# Patient Record
Sex: FEMALE | Race: WHITE | NOT HISPANIC OR LATINO | Employment: UNEMPLOYED | URBAN - METROPOLITAN AREA
[De-identification: names, ages, dates, MRNs, and addresses within clinical notes are randomized per-mention and may not be internally consistent; named-entity substitution may affect disease eponyms.]

---

## 2019-01-29 ENCOUNTER — HOSPITAL ENCOUNTER (OUTPATIENT)
Dept: OTHER | Facility: HOSPITAL | Age: 29
Discharge: HOME | End: 2019-01-29
Attending: SURGERY | Admitting: SURGERY

## 2019-01-29 LAB
ALBUMIN SERPL-MCNC: 3.9 G/DL (ref 3.5–4.8)
ALBUMIN/GLOB SERPL: 1.3 {RATIO} (ref 1–1.7)
ALP SERPL-CCNC: 73 IU/L (ref 32–91)
ALT SERPL-CCNC: 42 IU/L (ref 14–54)
ANION GAP SERPL CALC-SCNC: 14.9 MMOL/L (ref 10–20)
AST SERPL-CCNC: 34 IU/L (ref 15–41)
BASOPHILS # BLD AUTO: 0.1 10*3/UL (ref 0–0.2)
BASOPHILS NFR BLD AUTO: 1 % (ref 0–2)
BILIRUB SERPL-MCNC: 0.5 MG/DL (ref 0.3–1.2)
BUN SERPL-MCNC: 13 MG/DL (ref 8–20)
BUN/CREAT SERPL: 16.3 (ref 5.4–26.2)
CALCIUM SERPL-MCNC: 9 MG/DL (ref 8.9–10.3)
CHLORIDE SERPL-SCNC: 100 MMOL/L (ref 101–111)
CHOLEST SERPL-MCNC: 205 MG/DL
CHOLEST/HDLC SERPL: 4.6 {RATIO}
CONV CO2: 26 MMOL/L (ref 22–32)
CONV LDL CHOLESTEROL DIRECT: 126 MG/DL (ref 0–100)
CONV TOTAL PROTEIN: 7 G/DL (ref 6.1–7.9)
CREAT UR-MCNC: 0.8 MG/DL (ref 0.4–1)
DIFFERENTIAL METHOD BLD: (no result)
EOSINOPHIL # BLD AUTO: 0.1 10*3/UL (ref 0–0.3)
EOSINOPHIL # BLD AUTO: 1 % (ref 0–3)
ERYTHROCYTE [DISTWIDTH] IN BLOOD BY AUTOMATED COUNT: 13.3 % (ref 11.5–14.5)
GLOBULIN UR ELPH-MCNC: 3.1 G/DL (ref 2.5–3.8)
GLUCOSE SERPL-MCNC: 103 MG/DL (ref 65–99)
HCT VFR BLD AUTO: 42.1 % (ref 35–49)
HDLC SERPL-MCNC: 44 MG/DL
HGB BLD-MCNC: 13.9 G/DL (ref 12–15)
LDLC/HDLC SERPL: 2.8 {RATIO}
LIPID INTERPRETATION: ABNORMAL
LYMPHOCYTES # BLD AUTO: 2.7 10*3/UL (ref 0.8–4.8)
LYMPHOCYTES NFR BLD AUTO: 27 % (ref 18–42)
MCH RBC QN AUTO: 28.9 PG (ref 26–32)
MCHC RBC AUTO-ENTMCNC: 33 G/DL (ref 32–36)
MCV RBC AUTO: 87.4 FL (ref 80–94)
MONOCYTES # BLD AUTO: 0.5 10*3/UL (ref 0.1–1.3)
MONOCYTES NFR BLD AUTO: 5 % (ref 2–11)
NEUTROPHILS # BLD AUTO: 6.8 10*3/UL (ref 2.3–8.6)
NEUTROPHILS NFR BLD AUTO: 66 % (ref 50–75)
NRBC BLD AUTO-RTO: 0 /100{WBCS}
NRBC/RBC NFR BLD MANUAL: 0 10*3/UL
PLATELET # BLD AUTO: 420 10*3/UL (ref 150–450)
PMV BLD AUTO: 7.7 FL (ref 7.4–10.4)
POTASSIUM SERPL-SCNC: 3.9 MMOL/L (ref 3.6–5.1)
RBC # BLD AUTO: 4.82 10*6/UL (ref 4–5.4)
SODIUM SERPL-SCNC: 137 MMOL/L (ref 136–144)
TRIGL SERPL-MCNC: 303 MG/DL
VLDLC SERPL CALC-MCNC: 34.2 MG/DL
WBC # BLD AUTO: 10.1 10*3/UL (ref 4.5–11.5)

## 2019-04-18 ENCOUNTER — HOSPITAL ENCOUNTER (OUTPATIENT)
Dept: GASTROENTEROLOGY | Facility: HOSPITAL | Age: 29
Setting detail: HOSPITAL OUTPATIENT SURGERY
Discharge: HOME | End: 2019-04-18
Attending: SURGERY | Admitting: SURGERY

## 2019-04-18 LAB — HCG UR QL: NEGATIVE

## 2019-06-04 ENCOUNTER — CONVERSION ENCOUNTER (OUTPATIENT)
Dept: OTHER | Facility: HOSPITAL | Age: 29
End: 2019-06-04

## 2019-06-04 VITALS — DIASTOLIC BLOOD PRESSURE: 97 MMHG | HEART RATE: 71 BPM | SYSTOLIC BLOOD PRESSURE: 145 MMHG | WEIGHT: 293 LBS

## 2019-06-06 NOTE — PROGRESS NOTES
Vital Signs -Extended   Weight: 323.6 pounds  Pulse rate: 71 /min  Blood Pressure: 145/97 mm Hg    Calculations   Body Mass Index: 50.87      Body Surface Area (m2): 2.48    Current Allergies:  No known allergies        Weight in # changed since last appt: +.8  Total Weight Change: +8.4  Short term Goal Progress: 1) met having lean cuisine/healthy choice/[employer] 3x wk, 2) Partially met 5 days a week journaling, 3) Met cooking health recipes for dinner  Exercise   Exercise Type: aerobic  Frequency of exercise: 7 days week  Duration of exercise: < 20 min  Intensity of exercise: moderate  Weight loss RX: 2000 calories  Weight loss RX Comments: 500-600 kcal/meal, 100-200 kcal/snack   Goals Status: goals partially met  Objectives to success: emotional, work/family schedule  Additional comments: Has continued to eat and drink separately. Getting 64 fl oz. Walking up and down stairs more.   24 Hr Diet History   1st Meal: yogurt  2nd Meal: Lean Cuisine, [place] sandwich dried fruit  3rd Meal: Pork Chops with apples, Baked turkey & chicken, Spaghetti, Grilled Steak   Beverages: water  Alcohol: Occasionally   Out to Eat: has not eaten out  Who cooks: pt   Who shops: pt   Short Term Goals:   1. Wt loss goal 3-5# by preop.   2. Journaling food 5 days per week by preop.   Time: preop   Physician: Dr. Maciel   Patient given copy of office visit note.         Electronically signed by Lili POE on 06/04/2019 at 11:47 AM  ________________________________________________________________________       Disclaimer: Converted Note message may not contain all data elements that existed in the [employer] source system. Please see [employer] for the original note details.

## 2019-06-24 ENCOUNTER — OFFICE VISIT (OUTPATIENT)
Dept: BARIATRICS/WEIGHT MGMT | Facility: CLINIC | Age: 29
End: 2019-06-24

## 2019-06-24 VITALS
BODY MASS INDEX: 43.4 KG/M2 | DIASTOLIC BLOOD PRESSURE: 77 MMHG | HEIGHT: 69 IN | SYSTOLIC BLOOD PRESSURE: 129 MMHG | WEIGHT: 293 LBS | HEART RATE: 77 BPM

## 2019-06-24 DIAGNOSIS — E66.9 SUPER OBESE: Primary | ICD-10-CM

## 2019-06-24 NOTE — PATIENT INSTRUCTIONS
1) Contine drinking and eating separately, 2) Continue exercise, 3) Journaling foor 5 days per week,

## 2019-07-10 ENCOUNTER — PREP FOR SURGERY (OUTPATIENT)
Dept: OTHER | Facility: HOSPITAL | Age: 29
End: 2019-07-10

## 2019-07-10 DIAGNOSIS — E66.01 OBESITY, CLASS III, BMI 40-49.9 (MORBID OBESITY) (HCC): Primary | ICD-10-CM

## 2019-07-10 RX ORDER — CEFAZOLIN SODIUM IN 0.9 % NACL 3 G/100 ML
3 INTRAVENOUS SOLUTION, PIGGYBACK (ML) INTRAVENOUS
Status: CANCELLED | OUTPATIENT
Start: 2019-07-10

## 2019-07-10 RX ORDER — SCOLOPAMINE TRANSDERMAL SYSTEM 1 MG/1
1 PATCH, EXTENDED RELEASE TRANSDERMAL ONCE
Status: CANCELLED | OUTPATIENT
Start: 2019-07-10 | End: 2019-07-10

## 2019-07-10 RX ORDER — SODIUM CHLORIDE 0.9 % (FLUSH) 0.9 %
3-10 SYRINGE (ML) INJECTION AS NEEDED
Status: CANCELLED | OUTPATIENT
Start: 2019-07-10

## 2019-07-10 RX ORDER — CHLORHEXIDINE GLUCONATE 0.12 MG/ML
15 RINSE ORAL SEE ADMIN INSTRUCTIONS
Status: CANCELLED | OUTPATIENT
Start: 2019-07-10

## 2019-07-10 RX ORDER — SODIUM CHLORIDE, SODIUM LACTATE, POTASSIUM CHLORIDE, CALCIUM CHLORIDE 600; 310; 30; 20 MG/100ML; MG/100ML; MG/100ML; MG/100ML
100 INJECTION, SOLUTION INTRAVENOUS CONTINUOUS
Status: CANCELLED | OUTPATIENT
Start: 2019-07-10

## 2019-07-10 RX ORDER — METOCLOPRAMIDE HYDROCHLORIDE 5 MG/ML
10 INJECTION INTRAMUSCULAR; INTRAVENOUS ONCE
Status: CANCELLED | OUTPATIENT
Start: 2019-07-10 | End: 2019-07-10

## 2019-07-10 RX ORDER — PANTOPRAZOLE SODIUM 40 MG/10ML
40 INJECTION, POWDER, LYOPHILIZED, FOR SOLUTION INTRAVENOUS ONCE
Status: CANCELLED | OUTPATIENT
Start: 2019-07-10 | End: 2019-07-10

## 2019-07-10 RX ORDER — SODIUM CHLORIDE 0.9 % (FLUSH) 0.9 %
3 SYRINGE (ML) INJECTION EVERY 12 HOURS SCHEDULED
Status: CANCELLED | OUTPATIENT
Start: 2019-07-10

## 2019-07-11 PROBLEM — E66.01 OBESITY, CLASS III, BMI 40-49.9 (MORBID OBESITY): Status: ACTIVE | Noted: 2019-07-11

## 2019-07-15 ENCOUNTER — CONSULT (OUTPATIENT)
Dept: BARIATRICS/WEIGHT MGMT | Facility: CLINIC | Age: 29
End: 2019-07-15

## 2019-07-15 VITALS
RESPIRATION RATE: 16 BRPM | SYSTOLIC BLOOD PRESSURE: 109 MMHG | TEMPERATURE: 97.9 F | HEART RATE: 77 BPM | BODY MASS INDEX: 43.4 KG/M2 | HEIGHT: 69 IN | WEIGHT: 293 LBS | DIASTOLIC BLOOD PRESSURE: 75 MMHG

## 2019-07-15 DIAGNOSIS — E66.01 OBESITY, CLASS III, BMI 40-49.9 (MORBID OBESITY) (HCC): Primary | ICD-10-CM

## 2019-07-15 PROCEDURE — 99214 OFFICE O/P EST MOD 30 MIN: CPT | Performed by: SURGERY

## 2019-07-15 NOTE — PROGRESS NOTES
Bariatric Consult:  Referred by Gadiel Barrett MD    Gwen Barnes is here today for consult on Consult (preop visit gastric sleeve)      History of Present Illness:     Gwen Barnes is a 29 y.o. female with morbid obesity with co-morbidities including none who presents for surgical consultation for the above procedure. Gwen has completed the initial intake visit and has been examined by our nurse practitioner, dietician, psychologist and underwent the extensive educational teaching process under the guidance of our bariatric coordinator and myself. Gwen also has seen the educational video HUSEYIN on the surgical procedure if available. Gwen attended today more educational teaching from our bariatric coordinator and myself. Gwen has had an extensive medical workup including a visit with their primary care physician, EKG, chest radiograph, blood work, EGD or UGI and possibly further testing. These have been reviewed by me and discussed with the patient. Gwen is now ready to proceed with surgery. Gwen presently denies nausea, vomiting, fever, chills, chest pain, shortness of air, melena, hematochezia, hemetemesis, dysuria, frequency, hematuria, jaundice or abdominal pain.     Her pre-op EGD shows normal      PMH/PSH- tonsillectomy, D&C, PCOS, anxiety, fatigue, heartburn,     Patient Active Problem List   Diagnosis   • Obesity, Class III, BMI 40-49.9 (morbid obesity) (CMS/HCC)       Allergies no known allergies    No current outpatient medications on file.    Social History     Socioeconomic History   • Marital status: Single     Spouse name: Not on file   • Number of children: Not on file   • Years of education: Not on file   • Highest education level: Not on file       No family history on file.    Review of Systems:  Review of Systems   Constitutional: Negative.    HENT: Negative.    Eyes: Negative.    Respiratory: Negative.    Cardiovascular: Negative.    Gastrointestinal: Negative.    Endocrine:  Negative.    Genitourinary: Negative.    Musculoskeletal: Negative.    Skin: Negative.    Allergic/Immunologic: Negative.    Neurological: Negative.    Hematological: Negative.    Psychiatric/Behavioral: Negative.          Physical Exam:    Vital Signs:  Weight: (!) 146 kg (321 lb 3.2 oz)   Body mass index is 47.43 kg/m².  Temp: 97.9 °F (36.6 °C)   Heart Rate: 77   BP: 109/75       Physical Exam   Constitutional: She is oriented to person, place, and time. She appears well-developed and well-nourished.   HENT:   Head: Normocephalic and atraumatic.   Eyes: Conjunctivae and EOM are normal. Pupils are equal, round, and reactive to light.   Neck: Normal range of motion. Neck supple.   Cardiovascular: Normal rate, regular rhythm, normal heart sounds and intact distal pulses.   Pulmonary/Chest: Effort normal and breath sounds normal.   Abdominal: Soft. Bowel sounds are normal. She exhibits no distension and no mass. There is no tenderness. There is no rebound and no guarding. No hernia.   Musculoskeletal: Normal range of motion. She exhibits no edema.   Neurological: She is alert and oriented to person, place, and time.   Skin: Skin is warm and dry.   Psychiatric: She has a normal mood and affect.   Vitals reviewed.        Assessment:    Gwen Barnes is a 29 y.o. year old female with medically complicated severe obesity with a BMI of Body mass index is 47.43 kg/m². and multiple co-morbidities listed in the encounter diagnosis.    I think she is an appropriate candidate for this surgery, and is ready to proceed.      Plan/Discussion/Summary:        I instructed patient to start on a H2 blocker or proton pump inhibitor if not already on one of these medications.    I explained in detail the procedures that we perform.  All of these procedures have a chance to convert to open if any technical challenges or complications do occur.  Bariatric surgery is not cosmetic surgery but rather a tool to help a patient make a life-long  commitment lifestyle change including diet, exercise, behavior changes, and taking supplemental vitamins and minerals.    Problems after surgery may require more operations to correct them.    The risks, benefits, alternatives, and potential complications of all of the procedures were explained in detail including, but not limited to death, anesthesia and medication adverse effect, deep venous thrombosis, pulmonary embolism, trocar site/incisional hernia, wound infection, abdominal infection, bleeding, failure to lose weight, gain weight, a change in body image, metabolic complications with vitamin deficiences and anemia.    Weight loss expectations were discussed with the patient in detail. The weight loss operations most commonly performed are the sleeve gastrectomy and the Edwar-en-Y gastric bypass. These operations result in weight losses up to approximately 25-35% of initial body weight 12 to 24 months after surgery with the gastric bypass usually the higher percent of weight loss but depends on patient using the tool.    For the gastric bypass and loop duodenal switch (ANALIA-S) the risks include but not limited to the following early complications:  Anastomotic leak/peritonitis, Edwar/Alimentary/biliopancreatic limb obstruction, severe & minor wound infection/seroma, and nausea/vomiting.  Late complications can include but are not limited to malnutrition, vitamin deficiencies, frequent loose stools,  stomal stenosis, marginal ulcer, bowel obstruction, intussusception, internal, and incisional hernia.    Regarding the gastric sleeve, there is less long-term outcome data and higher risk of dysphagia and reflux compared to a gastric bypass, as well as risk of internal visceral/organ injury, splenectomy, bleeding, infection, leak (which could require further intervention possible conversion to Edwar-en-Y gastric bypass), stenosis and possibility of regaining weight.    Gwen was counseled regarding diagnostic results,  instructions for management, risk factor reductions, prognosis, patient and family education, impressions, risks and benefits of treatment options and importance of compliance with treatment. Total face to face time of the encounter was over 45 minutes and over 30 minutes was spent counseling.     Kayley Report   As part of this patient's treatment plan I am prescribing controlled substances. The patient has been made aware of appropriate use of such medications, including potential risk of somnolence, limited ability to drive and /or work safely, and potential for dependence or overdose. It has also been made clear that these medications are for use by this patient only, without concomitant use of alcohol or other substances unless prescribed.    Gwen has completed prescribing agreement detailing terms of continued prescribing of controlled substances, including monitoring KAYLEY reports, urine drug screening, and pill counts if necessary. Gwen is aware that inappropriate use will result in cessation of prescribing such medications.    KAYLEY report has been reviewed      History and physical exam exhibit continued safe and appropriate use of controlled substances.      Gwen understands the surgical procedures and the different surgical options that are available.  She understands the lifestyle changes that are required after surgery and has agreed to follow the guidelines outlined in the weight management program.  She also expressed understanding of the risks involved and had all of female questions answered and desires to proceed.      Luna Keyes MD  7/15/2019

## 2019-07-16 RX ORDER — URSODIOL 250 MG/1
250 TABLET, FILM COATED ORAL 2 TIMES DAILY
Qty: 60 TABLET | Refills: 5 | Status: SHIPPED | OUTPATIENT
Start: 2019-07-16 | End: 2019-08-15

## 2019-07-26 ENCOUNTER — APPOINTMENT (OUTPATIENT)
Dept: PREADMISSION TESTING | Facility: HOSPITAL | Age: 29
End: 2019-07-26

## 2019-07-26 VITALS
SYSTOLIC BLOOD PRESSURE: 123 MMHG | HEART RATE: 61 BPM | OXYGEN SATURATION: 100 % | WEIGHT: 293 LBS | HEIGHT: 69 IN | DIASTOLIC BLOOD PRESSURE: 86 MMHG | BODY MASS INDEX: 43.4 KG/M2

## 2019-07-26 DIAGNOSIS — E66.01 OBESITY, CLASS III, BMI 40-49.9 (MORBID OBESITY) (HCC): ICD-10-CM

## 2019-07-26 LAB
ABO GROUP BLD: NORMAL
ALBUMIN SERPL-MCNC: 3.8 G/DL (ref 3.5–4.8)
ALBUMIN/GLOB SERPL: 1.1 G/DL (ref 1–1.7)
ALP SERPL-CCNC: 64 U/L (ref 32–91)
ALT SERPL W P-5'-P-CCNC: 48 U/L (ref 14–54)
ANION GAP SERPL CALCULATED.3IONS-SCNC: 14.1 MMOL/L (ref 5–15)
APTT PPP: 25.9 SECONDS (ref 24–31)
AST SERPL-CCNC: 43 U/L (ref 15–41)
BILIRUB SERPL-MCNC: 0.5 MG/DL (ref 0.3–1.2)
BLD GP AB SCN SERPL QL: NEGATIVE
BUN BLD-MCNC: 13 MG/DL (ref 8–20)
BUN/CREAT SERPL: 14.4 (ref 5.4–26.2)
CALCIUM SPEC-SCNC: 8.9 MG/DL (ref 8.9–10.3)
CHLORIDE SERPL-SCNC: 101 MMOL/L (ref 101–111)
CO2 SERPL-SCNC: 26 MMOL/L (ref 22–32)
CREAT BLD-MCNC: 0.9 MG/DL (ref 0.4–1)
DEPRECATED RDW RBC AUTO: 41.1 FL (ref 37–54)
ERYTHROCYTE [DISTWIDTH] IN BLOOD BY AUTOMATED COUNT: 13.5 % (ref 12.3–15.4)
GFR SERPL CREATININE-BSD FRML MDRD: 75 ML/MIN/1.73
GLOBULIN UR ELPH-MCNC: 3.4 GM/DL (ref 2.5–3.8)
GLUCOSE BLD-MCNC: 80 MG/DL (ref 65–99)
HCT VFR BLD AUTO: 37.5 % (ref 34–46.6)
HGB BLD-MCNC: 12.7 G/DL (ref 12–15.9)
INR PPP: 1.01 (ref 0.9–1.1)
MCH RBC QN AUTO: 29.1 PG (ref 26.6–33)
MCHC RBC AUTO-ENTMCNC: 33.9 G/DL (ref 31.5–35.7)
MCV RBC AUTO: 85.6 FL (ref 79–97)
PLATELET # BLD AUTO: 400 10*3/MM3 (ref 140–450)
PMV BLD AUTO: 7.8 FL (ref 6–12)
POTASSIUM BLD-SCNC: 4.1 MMOL/L (ref 3.6–5.1)
PROT SERPL-MCNC: 7.2 G/DL (ref 6.1–7.9)
PROTHROMBIN TIME: 10.4 SECONDS (ref 9.6–11.7)
RBC # BLD AUTO: 4.38 10*6/MM3 (ref 3.77–5.28)
RH BLD: POSITIVE
SODIUM BLD-SCNC: 137 MMOL/L (ref 136–144)
T&S EXPIRATION DATE: NORMAL
WBC NRBC COR # BLD: 8.5 10*3/MM3 (ref 3.4–10.8)

## 2019-07-26 PROCEDURE — 85027 COMPLETE CBC AUTOMATED: CPT | Performed by: SURGERY

## 2019-07-26 PROCEDURE — 86900 BLOOD TYPING SEROLOGIC ABO: CPT

## 2019-07-26 PROCEDURE — 86850 RBC ANTIBODY SCREEN: CPT | Performed by: SURGERY

## 2019-07-26 PROCEDURE — 85730 THROMBOPLASTIN TIME PARTIAL: CPT | Performed by: SURGERY

## 2019-07-26 PROCEDURE — 85610 PROTHROMBIN TIME: CPT | Performed by: SURGERY

## 2019-07-26 PROCEDURE — 80053 COMPREHEN METABOLIC PANEL: CPT | Performed by: SURGERY

## 2019-07-26 PROCEDURE — 86900 BLOOD TYPING SEROLOGIC ABO: CPT | Performed by: SURGERY

## 2019-07-26 PROCEDURE — 86901 BLOOD TYPING SEROLOGIC RH(D): CPT | Performed by: SURGERY

## 2019-07-26 PROCEDURE — 36415 COLL VENOUS BLD VENIPUNCTURE: CPT

## 2019-07-26 PROCEDURE — 86901 BLOOD TYPING SEROLOGIC RH(D): CPT

## 2019-07-26 RX ORDER — ACETAMINOPHEN 325 MG/1
650 TABLET ORAL EVERY 6 HOURS PRN
COMMUNITY
End: 2021-05-15

## 2019-08-05 ENCOUNTER — ANESTHESIA EVENT (OUTPATIENT)
Dept: PERIOP | Facility: HOSPITAL | Age: 29
End: 2019-08-05

## 2019-08-06 ENCOUNTER — ANESTHESIA (OUTPATIENT)
Dept: PERIOP | Facility: HOSPITAL | Age: 29
End: 2019-08-06

## 2019-08-06 ENCOUNTER — HOSPITAL ENCOUNTER (INPATIENT)
Facility: HOSPITAL | Age: 29
LOS: 1 days | Discharge: HOME | End: 2019-08-07
Attending: SURGERY | Admitting: SURGERY

## 2019-08-06 DIAGNOSIS — E66.01 OBESITY, CLASS III, BMI 40-49.9 (MORBID OBESITY) (HCC): ICD-10-CM

## 2019-08-06 LAB
ANION GAP SERPL CALCULATED.3IONS-SCNC: 16.7 MMOL/L (ref 5–15)
B-HCG UR QL: NEGATIVE
BUN BLD-MCNC: 13 MG/DL (ref 8–20)
BUN/CREAT SERPL: 13 (ref 5.4–26.2)
CALCIUM SPEC-SCNC: 8.3 MG/DL (ref 8.9–10.3)
CHLORIDE SERPL-SCNC: 97 MMOL/L (ref 101–111)
CO2 SERPL-SCNC: 22 MMOL/L (ref 22–32)
CREAT BLD-MCNC: 1 MG/DL (ref 0.4–1)
GFR SERPL CREATININE-BSD FRML MDRD: 67 ML/MIN/1.73
GLUCOSE BLD-MCNC: 163 MG/DL (ref 65–99)
POTASSIUM BLD-SCNC: 3.7 MMOL/L (ref 3.6–5.1)
SODIUM BLD-SCNC: 132 MMOL/L (ref 136–144)

## 2019-08-06 PROCEDURE — 25010000002 METOCLOPRAMIDE PER 10 MG: Performed by: SURGERY

## 2019-08-06 PROCEDURE — 94640 AIRWAY INHALATION TREATMENT: CPT

## 2019-08-06 PROCEDURE — 25010000002 ONDANSETRON PER 1 MG: Performed by: ANESTHESIOLOGIST ASSISTANT

## 2019-08-06 PROCEDURE — 25010000002 PROPOFOL 200 MG/20ML EMULSION: Performed by: ANESTHESIOLOGIST ASSISTANT

## 2019-08-06 PROCEDURE — 80048 BASIC METABOLIC PNL TOTAL CA: CPT | Performed by: SURGERY

## 2019-08-06 PROCEDURE — 25010000002 HYDROMORPHONE PER 4 MG: Performed by: ANESTHESIOLOGIST ASSISTANT

## 2019-08-06 PROCEDURE — 43775 LAP SLEEVE GASTRECTOMY: CPT | Performed by: REGISTERED NURSE

## 2019-08-06 PROCEDURE — 25010000002 FENTANYL CITRATE (PF) 100 MCG/2ML SOLUTION: Performed by: ANESTHESIOLOGIST ASSISTANT

## 2019-08-06 PROCEDURE — 25010000002 ONDANSETRON PER 1 MG: Performed by: SURGERY

## 2019-08-06 PROCEDURE — 25010000002 MIDAZOLAM PER 1 MG: Performed by: ANESTHESIOLOGIST ASSISTANT

## 2019-08-06 PROCEDURE — 25010000002 DEXAMETHASONE PER 1 MG: Performed by: ANESTHESIOLOGIST ASSISTANT

## 2019-08-06 PROCEDURE — 0DB64Z3 EXCISION OF STOMACH, PERCUTANEOUS ENDOSCOPIC APPROACH, VERTICAL: ICD-10-PCS | Performed by: SURGERY

## 2019-08-06 PROCEDURE — 25010000002 KETOROLAC TROMETHAMINE PER 15 MG: Performed by: SURGERY

## 2019-08-06 PROCEDURE — 81025 URINE PREGNANCY TEST: CPT | Performed by: SURGERY

## 2019-08-06 PROCEDURE — 43775 LAP SLEEVE GASTRECTOMY: CPT | Performed by: SURGERY

## 2019-08-06 PROCEDURE — 88307 TISSUE EXAM BY PATHOLOGIST: CPT | Performed by: SURGERY

## 2019-08-06 PROCEDURE — 94799 UNLISTED PULMONARY SVC/PX: CPT

## 2019-08-06 DEVICE — PERI-STRIPS DRY WITH VERITAS COLLAGEN MATRIX (PSD-V) IS PREPARED FROM DEHYDRATED BOVINE PERICARDIUM PROCURED FROM CATTLE UNDER 30 MONTHS OF AGE IN THE UNITED STATES. ONE (1) TUBE OF PSD GEL (GEL) IS PROVIDED FOR EVERY TWO (2) POUCHES OF PSD-V. THE GEL IS USED TO CREATE A TEMPORARY BOND BETWEEN THE PSD-V BUTTRESS AND THE SURGICAL STAPLER JAWS UNTIL THE STAPLER IS POSITIONED AND FIRED.
Type: IMPLANTABLE DEVICE | Site: STOMACH | Status: FUNCTIONAL
Brand: PERI-STRIPS DRY WITH VERITAS COLLAGEN MATRIX

## 2019-08-06 DEVICE — ENDOPATH ECHELON ENDOSCOPIC LINEAR CUTTER RELOADS, GREEN, 60MM
Type: IMPLANTABLE DEVICE | Site: STOMACH | Status: FUNCTIONAL
Brand: ECHELON ENDOPATH

## 2019-08-06 DEVICE — SEALANT WND FIBRIN TISSEEL PREFIL/SYR/PRIMAFZ 4ML: Type: IMPLANTABLE DEVICE | Site: STOMACH | Status: FUNCTIONAL

## 2019-08-06 RX ORDER — LIDOCAINE HYDROCHLORIDE 10 MG/ML
INJECTION, SOLUTION EPIDURAL; INFILTRATION; INTRACAUDAL; PERINEURAL AS NEEDED
Status: DISCONTINUED | OUTPATIENT
Start: 2019-08-06 | End: 2019-08-06 | Stop reason: SURG

## 2019-08-06 RX ORDER — PANTOPRAZOLE SODIUM 40 MG/10ML
40 INJECTION, POWDER, LYOPHILIZED, FOR SOLUTION INTRAVENOUS ONCE
Status: COMPLETED | OUTPATIENT
Start: 2019-08-06 | End: 2019-08-06

## 2019-08-06 RX ORDER — PROMETHAZINE HYDROCHLORIDE 25 MG/1
25 TABLET ORAL ONCE AS NEEDED
Status: DISCONTINUED | OUTPATIENT
Start: 2019-08-06 | End: 2019-08-06

## 2019-08-06 RX ORDER — ONDANSETRON 2 MG/ML
4 INJECTION INTRAMUSCULAR; INTRAVENOUS EVERY 6 HOURS
Status: COMPLETED | OUTPATIENT
Start: 2019-08-06 | End: 2019-08-07

## 2019-08-06 RX ORDER — SODIUM CHLORIDE 0.9 % (FLUSH) 0.9 %
3-10 SYRINGE (ML) INJECTION AS NEEDED
Status: DISCONTINUED | OUTPATIENT
Start: 2019-08-06 | End: 2019-08-06 | Stop reason: HOSPADM

## 2019-08-06 RX ORDER — SODIUM CHLORIDE, SODIUM LACTATE, POTASSIUM CHLORIDE, CALCIUM CHLORIDE 600; 310; 30; 20 MG/100ML; MG/100ML; MG/100ML; MG/100ML
9 INJECTION, SOLUTION INTRAVENOUS CONTINUOUS PRN
Status: DISCONTINUED | OUTPATIENT
Start: 2019-08-06 | End: 2019-08-07 | Stop reason: HOSPADM

## 2019-08-06 RX ORDER — HYDRALAZINE HYDROCHLORIDE 20 MG/ML
5 INJECTION INTRAMUSCULAR; INTRAVENOUS
Status: DISCONTINUED | OUTPATIENT
Start: 2019-08-06 | End: 2019-08-06

## 2019-08-06 RX ORDER — ALBUTEROL SULFATE 2.5 MG/3ML
2.5 SOLUTION RESPIRATORY (INHALATION)
Status: DISCONTINUED | OUTPATIENT
Start: 2019-08-06 | End: 2019-08-06

## 2019-08-06 RX ORDER — NALOXONE HCL 0.4 MG/ML
0.4 VIAL (ML) INJECTION
Status: DISCONTINUED | OUTPATIENT
Start: 2019-08-06 | End: 2019-08-07 | Stop reason: HOSPADM

## 2019-08-06 RX ORDER — FAMOTIDINE 10 MG/ML
20 INJECTION, SOLUTION INTRAVENOUS EVERY 12 HOURS SCHEDULED
Status: DISCONTINUED | OUTPATIENT
Start: 2019-08-06 | End: 2019-08-07 | Stop reason: HOSPADM

## 2019-08-06 RX ORDER — LORAZEPAM 2 MG/ML
1 INJECTION INTRAMUSCULAR EVERY 12 HOURS PRN
Status: DISCONTINUED | OUTPATIENT
Start: 2019-08-06 | End: 2019-08-07 | Stop reason: HOSPADM

## 2019-08-06 RX ORDER — HYDROMORPHONE HCL 110MG/55ML
0.25 PATIENT CONTROLLED ANALGESIA SYRINGE INTRAVENOUS
Status: DISCONTINUED | OUTPATIENT
Start: 2019-08-06 | End: 2019-08-06

## 2019-08-06 RX ORDER — LABETALOL HYDROCHLORIDE 5 MG/ML
10 INJECTION, SOLUTION INTRAVENOUS
Status: DISCONTINUED | OUTPATIENT
Start: 2019-08-06 | End: 2019-08-07 | Stop reason: HOSPADM

## 2019-08-06 RX ORDER — ACETAMINOPHEN 500 MG
1000 TABLET ORAL EVERY 6 HOURS PRN
Status: DISCONTINUED | OUTPATIENT
Start: 2019-08-06 | End: 2019-08-07 | Stop reason: HOSPADM

## 2019-08-06 RX ORDER — GLYCOPYRROLATE 0.2 MG/ML
INJECTION INTRAMUSCULAR; INTRAVENOUS AS NEEDED
Status: DISCONTINUED | OUTPATIENT
Start: 2019-08-06 | End: 2019-08-06 | Stop reason: SURG

## 2019-08-06 RX ORDER — IPRATROPIUM BROMIDE AND ALBUTEROL SULFATE 2.5; .5 MG/3ML; MG/3ML
3 SOLUTION RESPIRATORY (INHALATION) ONCE AS NEEDED
Status: DISCONTINUED | OUTPATIENT
Start: 2019-08-06 | End: 2019-08-06

## 2019-08-06 RX ORDER — EPHEDRINE SULFATE 50 MG/ML
INJECTION INTRAVENOUS AS NEEDED
Status: DISCONTINUED | OUTPATIENT
Start: 2019-08-06 | End: 2019-08-06 | Stop reason: SURG

## 2019-08-06 RX ORDER — MORPHINE SULFATE 4 MG/ML
2 INJECTION, SOLUTION INTRAMUSCULAR; INTRAVENOUS
Status: DISCONTINUED | OUTPATIENT
Start: 2019-08-06 | End: 2019-08-07 | Stop reason: HOSPADM

## 2019-08-06 RX ORDER — MAGNESIUM HYDROXIDE 1200 MG/15ML
LIQUID ORAL AS NEEDED
Status: DISCONTINUED | OUTPATIENT
Start: 2019-08-06 | End: 2019-08-06 | Stop reason: HOSPADM

## 2019-08-06 RX ORDER — NALOXONE HCL 0.4 MG/ML
0.4 VIAL (ML) INJECTION AS NEEDED
Status: DISCONTINUED | OUTPATIENT
Start: 2019-08-06 | End: 2019-08-06

## 2019-08-06 RX ORDER — SODIUM CHLORIDE, SODIUM LACTATE, POTASSIUM CHLORIDE, CALCIUM CHLORIDE 600; 310; 30; 20 MG/100ML; MG/100ML; MG/100ML; MG/100ML
100 INJECTION, SOLUTION INTRAVENOUS CONTINUOUS
Status: DISCONTINUED | OUTPATIENT
Start: 2019-08-06 | End: 2019-08-07 | Stop reason: HOSPADM

## 2019-08-06 RX ORDER — MIDAZOLAM HYDROCHLORIDE 1 MG/ML
INJECTION INTRAMUSCULAR; INTRAVENOUS AS NEEDED
Status: DISCONTINUED | OUTPATIENT
Start: 2019-08-06 | End: 2019-08-06 | Stop reason: SURG

## 2019-08-06 RX ORDER — SCOLOPAMINE TRANSDERMAL SYSTEM 1 MG/1
1 PATCH, EXTENDED RELEASE TRANSDERMAL ONCE
Status: DISCONTINUED | OUTPATIENT
Start: 2019-08-06 | End: 2019-08-06

## 2019-08-06 RX ORDER — ALBUTEROL SULFATE 2.5 MG/3ML
2.5 SOLUTION RESPIRATORY (INHALATION) EVERY 6 HOURS PRN
Status: DISCONTINUED | OUTPATIENT
Start: 2019-08-06 | End: 2019-08-07 | Stop reason: HOSPADM

## 2019-08-06 RX ORDER — FENTANYL CITRATE 50 UG/ML
25 INJECTION, SOLUTION INTRAMUSCULAR; INTRAVENOUS
Status: DISCONTINUED | OUTPATIENT
Start: 2019-08-06 | End: 2019-08-06

## 2019-08-06 RX ORDER — FLUMAZENIL 0.1 MG/ML
0.2 INJECTION INTRAVENOUS AS NEEDED
Status: DISCONTINUED | OUTPATIENT
Start: 2019-08-06 | End: 2019-08-06

## 2019-08-06 RX ORDER — PROMETHAZINE HYDROCHLORIDE 25 MG/1
25 SUPPOSITORY RECTAL ONCE AS NEEDED
Status: DISCONTINUED | OUTPATIENT
Start: 2019-08-06 | End: 2019-08-06

## 2019-08-06 RX ORDER — HYDROMORPHONE HCL 110MG/55ML
PATIENT CONTROLLED ANALGESIA SYRINGE INTRAVENOUS AS NEEDED
Status: DISCONTINUED | OUTPATIENT
Start: 2019-08-06 | End: 2019-08-06 | Stop reason: SURG

## 2019-08-06 RX ORDER — LORAZEPAM 1 MG/1
1 TABLET ORAL EVERY 12 HOURS PRN
Status: DISCONTINUED | OUTPATIENT
Start: 2019-08-06 | End: 2019-08-07 | Stop reason: HOSPADM

## 2019-08-06 RX ORDER — METOCLOPRAMIDE HYDROCHLORIDE 5 MG/ML
10 INJECTION INTRAMUSCULAR; INTRAVENOUS EVERY 6 HOURS PRN
Status: DISCONTINUED | OUTPATIENT
Start: 2019-08-06 | End: 2019-08-07 | Stop reason: HOSPADM

## 2019-08-06 RX ORDER — SODIUM CHLORIDE 0.9 % (FLUSH) 0.9 %
3 SYRINGE (ML) INJECTION EVERY 12 HOURS SCHEDULED
Status: DISCONTINUED | OUTPATIENT
Start: 2019-08-06 | End: 2019-08-06 | Stop reason: HOSPADM

## 2019-08-06 RX ORDER — ONDANSETRON 2 MG/ML
4 INJECTION INTRAMUSCULAR; INTRAVENOUS ONCE AS NEEDED
Status: DISCONTINUED | OUTPATIENT
Start: 2019-08-06 | End: 2019-08-06

## 2019-08-06 RX ORDER — BUPIVACAINE HYDROCHLORIDE AND EPINEPHRINE 5; 5 MG/ML; UG/ML
INJECTION, SOLUTION EPIDURAL; INTRACAUDAL; PERINEURAL AS NEEDED
Status: DISCONTINUED | OUTPATIENT
Start: 2019-08-06 | End: 2019-08-06 | Stop reason: HOSPADM

## 2019-08-06 RX ORDER — MIDAZOLAM HYDROCHLORIDE 1 MG/ML
1 INJECTION INTRAMUSCULAR; INTRAVENOUS
Status: DISCONTINUED | OUTPATIENT
Start: 2019-08-06 | End: 2019-08-06

## 2019-08-06 RX ORDER — KETOROLAC TROMETHAMINE 30 MG/ML
30 INJECTION, SOLUTION INTRAMUSCULAR; INTRAVENOUS 4 TIMES DAILY
Status: COMPLETED | OUTPATIENT
Start: 2019-08-06 | End: 2019-08-07

## 2019-08-06 RX ORDER — DEXAMETHASONE SODIUM PHOSPHATE 4 MG/ML
INJECTION, SOLUTION INTRA-ARTICULAR; INTRALESIONAL; INTRAMUSCULAR; INTRAVENOUS; SOFT TISSUE AS NEEDED
Status: DISCONTINUED | OUTPATIENT
Start: 2019-08-06 | End: 2019-08-06 | Stop reason: SURG

## 2019-08-06 RX ORDER — EPHEDRINE SULFATE 50 MG/ML
5 INJECTION, SOLUTION INTRAVENOUS ONCE AS NEEDED
Status: DISCONTINUED | OUTPATIENT
Start: 2019-08-06 | End: 2019-08-06

## 2019-08-06 RX ORDER — FENTANYL CITRATE 50 UG/ML
INJECTION, SOLUTION INTRAMUSCULAR; INTRAVENOUS AS NEEDED
Status: DISCONTINUED | OUTPATIENT
Start: 2019-08-06 | End: 2019-08-06 | Stop reason: SURG

## 2019-08-06 RX ORDER — LIDOCAINE HYDROCHLORIDE 10 MG/ML
0.5 INJECTION, SOLUTION EPIDURAL; INFILTRATION; INTRACAUDAL; PERINEURAL ONCE AS NEEDED
Status: DISCONTINUED | OUTPATIENT
Start: 2019-08-06 | End: 2019-08-06 | Stop reason: HOSPADM

## 2019-08-06 RX ORDER — ONDANSETRON 2 MG/ML
INJECTION INTRAMUSCULAR; INTRAVENOUS AS NEEDED
Status: DISCONTINUED | OUTPATIENT
Start: 2019-08-06 | End: 2019-08-06 | Stop reason: SURG

## 2019-08-06 RX ORDER — SODIUM CHLORIDE, SODIUM LACTATE, POTASSIUM CHLORIDE, CALCIUM CHLORIDE 600; 310; 30; 20 MG/100ML; MG/100ML; MG/100ML; MG/100ML
150 INJECTION, SOLUTION INTRAVENOUS CONTINUOUS
Status: DISCONTINUED | OUTPATIENT
Start: 2019-08-06 | End: 2019-08-07 | Stop reason: HOSPADM

## 2019-08-06 RX ORDER — METOCLOPRAMIDE HYDROCHLORIDE 5 MG/ML
10 INJECTION INTRAMUSCULAR; INTRAVENOUS EVERY 6 HOURS
Status: DISCONTINUED | OUTPATIENT
Start: 2019-08-06 | End: 2019-08-07 | Stop reason: HOSPADM

## 2019-08-06 RX ORDER — PROMETHAZINE HYDROCHLORIDE 25 MG/ML
12.5 INJECTION, SOLUTION INTRAMUSCULAR; INTRAVENOUS EVERY 6 HOURS PRN
Status: DISCONTINUED | OUTPATIENT
Start: 2019-08-06 | End: 2019-08-07 | Stop reason: HOSPADM

## 2019-08-06 RX ORDER — PROMETHAZINE HYDROCHLORIDE 25 MG/ML
6.25 INJECTION, SOLUTION INTRAMUSCULAR; INTRAVENOUS ONCE AS NEEDED
Status: DISCONTINUED | OUTPATIENT
Start: 2019-08-06 | End: 2019-08-06

## 2019-08-06 RX ORDER — ROCURONIUM BROMIDE 10 MG/ML
INJECTION, SOLUTION INTRAVENOUS AS NEEDED
Status: DISCONTINUED | OUTPATIENT
Start: 2019-08-06 | End: 2019-08-06 | Stop reason: SURG

## 2019-08-06 RX ORDER — CYANOCOBALAMIN 1000 UG/ML
1000 INJECTION, SOLUTION INTRAMUSCULAR; SUBCUTANEOUS ONCE
Status: COMPLETED | OUTPATIENT
Start: 2019-08-07 | End: 2019-08-07

## 2019-08-06 RX ORDER — CHLORHEXIDINE GLUCONATE 0.12 MG/ML
15 RINSE ORAL SEE ADMIN INSTRUCTIONS
Status: DISCONTINUED | OUTPATIENT
Start: 2019-08-06 | End: 2019-08-06 | Stop reason: HOSPADM

## 2019-08-06 RX ORDER — CEFAZOLIN SODIUM IN 0.9 % NACL 3 G/100 ML
3 INTRAVENOUS SOLUTION, PIGGYBACK (ML) INTRAVENOUS
Status: COMPLETED | OUTPATIENT
Start: 2019-08-06 | End: 2019-08-06

## 2019-08-06 RX ORDER — METOCLOPRAMIDE HYDROCHLORIDE 5 MG/ML
10 INJECTION INTRAMUSCULAR; INTRAVENOUS ONCE
Status: COMPLETED | OUTPATIENT
Start: 2019-08-06 | End: 2019-08-06

## 2019-08-06 RX ORDER — DIPHENHYDRAMINE HYDROCHLORIDE 50 MG/ML
12.5 INJECTION INTRAMUSCULAR; INTRAVENOUS
Status: DISCONTINUED | OUTPATIENT
Start: 2019-08-06 | End: 2019-08-06

## 2019-08-06 RX ORDER — NALOXONE HCL 0.4 MG/ML
0.1 VIAL (ML) INJECTION
Status: DISCONTINUED | OUTPATIENT
Start: 2019-08-06 | End: 2019-08-07 | Stop reason: HOSPADM

## 2019-08-06 RX ORDER — PROPOFOL 10 MG/ML
INJECTION, EMULSION INTRAVENOUS AS NEEDED
Status: DISCONTINUED | OUTPATIENT
Start: 2019-08-06 | End: 2019-08-06 | Stop reason: SURG

## 2019-08-06 RX ORDER — DIPHENHYDRAMINE HYDROCHLORIDE 50 MG/ML
25 INJECTION INTRAMUSCULAR; INTRAVENOUS EVERY 4 HOURS PRN
Status: DISCONTINUED | OUTPATIENT
Start: 2019-08-06 | End: 2019-08-07 | Stop reason: HOSPADM

## 2019-08-06 RX ORDER — PHENYLEPHRINE HCL IN 0.9% NACL 0.5 MG/5ML
.5-3 SYRINGE (ML) INTRAVENOUS
Status: DISCONTINUED | OUTPATIENT
Start: 2019-08-06 | End: 2019-08-06

## 2019-08-06 RX ORDER — LABETALOL HYDROCHLORIDE 5 MG/ML
5 INJECTION, SOLUTION INTRAVENOUS
Status: DISCONTINUED | OUTPATIENT
Start: 2019-08-06 | End: 2019-08-06

## 2019-08-06 RX ORDER — HYDROMORPHONE HCL 110MG/55ML
0.5 PATIENT CONTROLLED ANALGESIA SYRINGE INTRAVENOUS
Status: DISCONTINUED | OUTPATIENT
Start: 2019-08-06 | End: 2019-08-07 | Stop reason: HOSPADM

## 2019-08-06 RX ORDER — PROCHLORPERAZINE MALEATE 5 MG/1
10 TABLET ORAL EVERY 6 HOURS PRN
Status: DISCONTINUED | OUTPATIENT
Start: 2019-08-06 | End: 2019-08-07 | Stop reason: HOSPADM

## 2019-08-06 RX ADMIN — FAMOTIDINE 20 MG: 10 INJECTION, SOLUTION INTRAVENOUS at 21:47

## 2019-08-06 RX ADMIN — SCOPALAMINE 1 PATCH: 1 PATCH, EXTENDED RELEASE TRANSDERMAL at 09:21

## 2019-08-06 RX ADMIN — PANTOPRAZOLE SODIUM 40 MG: 40 INJECTION, POWDER, FOR SOLUTION INTRAVENOUS at 09:16

## 2019-08-06 RX ADMIN — EPHEDRINE SULFATE 10 MG: 50 INJECTION, SOLUTION INTRAVENOUS at 10:25

## 2019-08-06 RX ADMIN — ALBUTEROL SULFATE 2.5 MG: 2.5 SOLUTION RESPIRATORY (INHALATION) at 13:53

## 2019-08-06 RX ADMIN — MIDAZOLAM 2 MG: 1 INJECTION INTRAMUSCULAR; INTRAVENOUS at 10:12

## 2019-08-06 RX ADMIN — FENTANYL CITRATE 100 MCG: 50 INJECTION, SOLUTION INTRAMUSCULAR; INTRAVENOUS at 10:23

## 2019-08-06 RX ADMIN — CEFAZOLIN 3 G: 1 INJECTION, POWDER, FOR SOLUTION INTRAMUSCULAR; INTRAVENOUS; PARENTERAL at 10:21

## 2019-08-06 RX ADMIN — HYDROMORPHONE HYDROCHLORIDE 0.5 MG: 2 INJECTION INTRAMUSCULAR; INTRAVENOUS; SUBCUTANEOUS at 10:52

## 2019-08-06 RX ADMIN — HYDROMORPHONE HYDROCHLORIDE 0.25 MG: 2 INJECTION, SOLUTION INTRAMUSCULAR; INTRAVENOUS; SUBCUTANEOUS at 11:50

## 2019-08-06 RX ADMIN — EPHEDRINE SULFATE 10 MG: 50 INJECTION, SOLUTION INTRAVENOUS at 10:30

## 2019-08-06 RX ADMIN — DEXAMETHASONE SODIUM PHOSPHATE 4 MG: 4 INJECTION, SOLUTION INTRAMUSCULAR; INTRAVENOUS at 10:21

## 2019-08-06 RX ADMIN — ROCURONIUM BROMIDE 10 MG: 10 INJECTION, SOLUTION INTRAVENOUS at 11:05

## 2019-08-06 RX ADMIN — KETOROLAC TROMETHAMINE 30 MG: 30 INJECTION, SOLUTION INTRAMUSCULAR at 18:33

## 2019-08-06 RX ADMIN — KETOROLAC TROMETHAMINE 30 MG: 30 INJECTION, SOLUTION INTRAMUSCULAR at 21:47

## 2019-08-06 RX ADMIN — ROCURONIUM BROMIDE 20 MG: 10 INJECTION, SOLUTION INTRAVENOUS at 10:30

## 2019-08-06 RX ADMIN — HYOSCYAMINE SULFATE 125 MCG: 0.12 TABLET ORAL at 13:35

## 2019-08-06 RX ADMIN — METOCLOPRAMIDE 10 MG: 5 INJECTION, SOLUTION INTRAMUSCULAR; INTRAVENOUS at 13:35

## 2019-08-06 RX ADMIN — HYDROMORPHONE HYDROCHLORIDE 0.5 MG: 2 INJECTION INTRAMUSCULAR; INTRAVENOUS; SUBCUTANEOUS at 11:26

## 2019-08-06 RX ADMIN — FENTANYL CITRATE 50 MCG: 50 INJECTION, SOLUTION INTRAMUSCULAR; INTRAVENOUS at 10:37

## 2019-08-06 RX ADMIN — ONDANSETRON 4 MG: 2 INJECTION INTRAMUSCULAR; INTRAVENOUS at 21:47

## 2019-08-06 RX ADMIN — PROPOFOL 200 MG: 10 INJECTION, EMULSION INTRAVENOUS at 10:13

## 2019-08-06 RX ADMIN — ONDANSETRON 4 MG: 2 INJECTION INTRAMUSCULAR; INTRAVENOUS at 11:09

## 2019-08-06 RX ADMIN — FOLIC ACID 250 ML/HR: 5 INJECTION, SOLUTION INTRAMUSCULAR; INTRAVENOUS; SUBCUTANEOUS at 23:50

## 2019-08-06 RX ADMIN — FAMOTIDINE 20 MG: 10 INJECTION, SOLUTION INTRAVENOUS at 13:35

## 2019-08-06 RX ADMIN — ONDANSETRON 4 MG: 2 INJECTION INTRAMUSCULAR; INTRAVENOUS at 13:35

## 2019-08-06 RX ADMIN — METOCLOPRAMIDE 10 MG: 5 INJECTION, SOLUTION INTRAMUSCULAR; INTRAVENOUS at 21:50

## 2019-08-06 RX ADMIN — HYDROMORPHONE HYDROCHLORIDE 0.25 MG: 2 INJECTION, SOLUTION INTRAMUSCULAR; INTRAVENOUS; SUBCUTANEOUS at 11:42

## 2019-08-06 RX ADMIN — SODIUM CHLORIDE, SODIUM LACTATE, POTASSIUM CHLORIDE, AND CALCIUM CHLORIDE 150 ML/HR: 600; 310; 30; 20 INJECTION, SOLUTION INTRAVENOUS at 15:01

## 2019-08-06 RX ADMIN — PANTOPRAZOLE SODIUM 40 MG: 40 INJECTION, POWDER, FOR SOLUTION INTRAVENOUS at 13:35

## 2019-08-06 RX ADMIN — FENTANYL CITRATE 50 MCG: 50 INJECTION, SOLUTION INTRAMUSCULAR; INTRAVENOUS at 10:13

## 2019-08-06 RX ADMIN — GLYCOPYRROLATE 0.2 MG: 0.2 INJECTION, SOLUTION INTRAMUSCULAR; INTRAVENOUS at 10:30

## 2019-08-06 RX ADMIN — LIDOCAINE HYDROCHLORIDE 50 MG: 10 INJECTION, SOLUTION EPIDURAL; INFILTRATION; INTRACAUDAL; PERINEURAL at 10:13

## 2019-08-06 RX ADMIN — KETOROLAC TROMETHAMINE 30 MG: 30 INJECTION, SOLUTION INTRAMUSCULAR at 13:35

## 2019-08-06 RX ADMIN — ROCURONIUM BROMIDE 50 MG: 10 INJECTION, SOLUTION INTRAVENOUS at 10:13

## 2019-08-06 RX ADMIN — HYDROMORPHONE HYDROCHLORIDE 0.25 MG: 2 INJECTION, SOLUTION INTRAMUSCULAR; INTRAVENOUS; SUBCUTANEOUS at 12:10

## 2019-08-06 RX ADMIN — HYOSCYAMINE SULFATE 125 MCG: 0.12 TABLET ORAL at 21:46

## 2019-08-06 RX ADMIN — HYOSCYAMINE SULFATE 125 MCG: 0.12 TABLET ORAL at 18:33

## 2019-08-06 RX ADMIN — SODIUM CHLORIDE, SODIUM LACTATE, POTASSIUM CHLORIDE, AND CALCIUM CHLORIDE 150 ML/HR: 600; 310; 30; 20 INJECTION, SOLUTION INTRAVENOUS at 21:51

## 2019-08-06 RX ADMIN — METOCLOPRAMIDE 10 MG: 5 INJECTION, SOLUTION INTRAMUSCULAR; INTRAVENOUS at 09:18

## 2019-08-06 RX ADMIN — ENOXAPARIN SODIUM 40 MG: 40 INJECTION SUBCUTANEOUS at 10:00

## 2019-08-06 RX ADMIN — SODIUM CHLORIDE, SODIUM LACTATE, POTASSIUM CHLORIDE, AND CALCIUM CHLORIDE: .6; .31; .03; .02 INJECTION, SOLUTION INTRAVENOUS at 10:07

## 2019-08-06 RX ADMIN — SODIUM CHLORIDE, SODIUM LACTATE, POTASSIUM CHLORIDE, AND CALCIUM CHLORIDE 9 ML/HR: 600; 310; 30; 20 INJECTION, SOLUTION INTRAVENOUS at 09:17

## 2019-08-06 RX ADMIN — SUGAMMADEX 200 MG: 100 INJECTION, SOLUTION INTRAVENOUS at 11:23

## 2019-08-06 NOTE — ANESTHESIA PREPROCEDURE EVALUATION
Anesthesia Evaluation     Patient summary reviewed and Nursing notes reviewed   no history of anesthetic complications:  NPO Solid Status: > 8 hours  NPO Liquid Status: > 8 hours           Airway   Mallampati: I  TM distance: >3 FB  Neck ROM: full  No difficulty expected  Dental - normal exam     Pulmonary - normal exam   Cardiovascular - normal exam        Neuro/Psych  (+) psychiatric history Anxiety,     GI/Hepatic/Renal/Endo    (+) morbid obesity, GERD,      Musculoskeletal     Abdominal    Substance History      OB/GYN          Other        ROS/Med Hx Other: PCOS, fatigue                Anesthesia Plan    ASA 3     general   (Patient identified; pre-operative vital signs, all relevant labs/studies, complete medical/surgical/anesthetic history, full medication list, full allergy list, and NPO status obtained/reviewed; physical assessment performed; anesthetic options, side effects, potential complications, risks, and benefits discussed; questions answered; written anesthesia consent obtained; patient cleared for procedure; anesthesia machine and equipment checked and functioning)  intravenous induction   Anesthetic plan, all risks, benefits, and alternatives have been provided, discussed and informed consent has been obtained with: patient.    Plan discussed with CAA.

## 2019-08-06 NOTE — ANESTHESIA POSTPROCEDURE EVALUATION
Patient: Gwen Barnes    Procedure Summary     Date:  08/06/19 Room / Location:  [facility] OR  / [facility] MAIN OR    Anesthesia Start:  1007 Anesthesia Stop:  1134    Procedure:  laparoscopic sleeve gastrectomy (N/A Abdomen) Diagnosis:       Obesity, Class III, BMI 40-49.9 (morbid obesity) (CMS/[facility])      (Obesity, Class III, BMI 40-49.9 (morbid obesity) (CMS/[facility]) [E66.01])    Surgeon:  Luna Keyes MD Provider:  Juan Fu MD    Anesthesia Type:  general ASA Status:  3          Anesthesia Type: general  Last vitals  BP   127/89 (08/06/19 1245)   Temp   97.8 °F (36.6 °C) (08/06/19 1245)   Pulse   96 (08/06/19 1356)   Resp   18 (08/06/19 1356)     SpO2   97 % (08/06/19 1353)     Post Anesthesia Care and Evaluation    Patient location during evaluation: PACU  Patient participation: complete - patient participated  Level of consciousness: awake  Pain scale: See nurse's notes for pain score.  Pain management: adequate  Airway patency: patent  Anesthetic complications: No anesthetic complications  PONV Status: none  Cardiovascular status: acceptable  Respiratory status: acceptable  Hydration status: acceptable    Comments: Patient seen and examined postoperatively; vital signs stable; SpO2 greater than or equal to 90%; cardiopulmonary status stable; nausea/vomiting adequately controlled; pain adequately controlled; no apparent anesthesia complications; patient discharged from anesthesia care when discharge criteria were met

## 2019-08-06 NOTE — ANESTHESIA PROCEDURE NOTES
Airway  Urgency: elective    Date/Time: 8/6/2019 10:17 AM  Airway not difficult    General Information and Staff    Patient location during procedure: OR  Anesthesiologist: Juan Fu MD  CRNA: Vladimir Taveras AA    Indications and Patient Condition  Indications for airway management: airway protection    Preoxygenated: yes  MILS maintained throughout  Mask difficulty assessment: 1 - vent by mask    Final Airway Details  Final airway type: endotracheal airway      Successful airway: ETT  Cuffed: yes   Successful intubation technique: direct laryngoscopy  Endotracheal tube insertion site: oral  Blade: [place]  Blade size: 3  ETT size (mm): 7.5  Cormack-Lehane Classification: grade I - full view of glottis  Placement verified by: chest auscultation and capnometry   Cuff volume (mL): 10  Measured from: lips  ETT to lips (cm): 21  Number of attempts at approach: 1    Additional Comments  Atraumatic intubation, soft gauzy bite block inserted

## 2019-08-06 NOTE — PLAN OF CARE
Problem: Patient Care Overview  Goal: Plan of Care Review  Outcome: Ongoing (interventions implemented as appropriate)   08/06/19 0720   Coping/Psychosocial   Plan of Care Reviewed With patient   OTHER   Outcome Summary pt is doing well after surgery, should be able to d/c tomorrow      Goal: Individualization and Mutuality  Outcome: Ongoing (interventions implemented as appropriate)    Goal: Discharge Needs Assessment  Outcome: Ongoing (interventions implemented as appropriate)    Goal: Interprofessional Rounds/Family Conf  Outcome: Ongoing (interventions implemented as appropriate)      Problem: Bariatric Surgery (Adult,Pediatric)  Goal: Signs and Symptoms of Listed Potential Problems Will be Absent, Minimized or Managed (Bariatric Surgery)  Outcome: Ongoing (interventions implemented as appropriate)    Goal: Anesthesia/Sedation Recovery  Outcome: Ongoing (interventions implemented as appropriate)

## 2019-08-06 NOTE — OP NOTE
PREOPERATIVE DIAGNOSIS:  Morbid obesity with multiple comorbidities as referenced in the most recent history and physical.    POSTOPERATIVE DIAGNOSIS:  Morbid obesity with multiple comorbidities as referenced in the most recent history and physical.    PROCEDURES PERFORMED:  1.  Laparoscopic sleeve gastrectomy.  2.  Tisseel application.     SURGEON:  Luna Keyes MD FACS, [facility]    ASSISTANT:  Rosetta Paredes RN    Surgery assisted and facilitated by a certified physician assistant, who directly resulted in a decreased operative time, anesthetic time, wound exposure, and possibly of an operative wound infection, thereby decreasing patient morbidity and ultimately total expenditures.  The surgical assistant assisted in placement of trochars, take down of the gastrocolic omentum, short gastric vessels and dissection at the angle of His.  Also assisted in retraction of the stomach during stapling so as not to kink the gastric sleeve.  Also assisted in removing of the gastric specimen, closure of the fascial defect as well as closure of the skin incisions.    ANESTHESIA:  General endotracheal.    ESTIMATED BLOOD LOSS:   Less than 25 mL unless dictated below.    FLUIDS:  Crystalloids.    SPECIMENS:  Gastric remnant    DRAINS:  None.    COUNTS:  Correct.    COMPLICATIONS:  None.    INDICATIONS:  This patient with morbid obesity and associated comorbidities presents for elective laparoscopic, possible open sleeve gastrectomy.  The patient has received medical clearance to proceed.  The patient has undergone our extensive educational process and consent process and wishes to proceed.    EGD demonstrated no hiatal hernia.    DESCRIPTION OF PROCEDURE:  The patient was brought to the operating room and placed supine upon the operating room table. SCD hose were placed.  The patient underwent uneventful general endotracheal anesthesia per the anesthesiology staff. The abdomen was prepped with ChloraPrep and draped in the usual  sterile fashion.  An Ioban was used as well if not allergic.  Depending on the technique to size the gastric sleeve, anesthesia staff either passed a 18-Yoruba gastric tube or a 36-Yoruba ViSiGi bougie into the stomach to decompress and then was pulled back to the esophagus.     A 5-10 mm transverse incision was made a few centimeters above and to the left of the umbilicus and the peritoneal cavity entered under direct camera visualization using a 5 or 10 mm 0° laparoscope and an Optiview trocar.  The abdomen was then insufflated to a pressure of 15-16 mmHg with CO2 gas.  Exploratory laparoscopy revealed no evidence of injury from the entrance technique and no significant abnormalities unless addendum dictated below.  An angled laparoscope was then used.  The patient was placed in reverse Trendelenburg position.  Under direct camera visualization a 5 mm trocar was placed in the left lateral subcostal position.  A 12 mm trocar was placed in the right midabdominal position.  A 12 mm trocar was placed in the supraumbilical position. A Seb retractor was placed through an epigastric incision and used to elevate the left lobe of the liver.  The fat pad was elevated and the left coy exposed.  At this point, approximately senior care along the greater curvature, the gastrocolic omentum was divided with the Enseal and this proceeded superiorly to the angle of His taking down the short gastric vessels.  All posterior attachments of the lesser sac and posterior aspect of the stomach to the pancreas were taken down as well.  The left coy was exposed along its length.  Dissection then proceeded medially taking down the greater curvature with an Enseal until just proximal to the pylorus.  The 18-Yoruba orogastric tube was passed back down into the stomach by anesthesia a Standard Bariatric Clamp was then positioned with the tip 1 cm to the left of the GE junction, 3cm from angularis incisura and 6 cm proximal to the pyloris.   The 1st load was a green tissue load on the Prichard Flex stapler with Veritas Rosa-Strip and this was placed 6 cm proximal to the pylorus and up against the clamp pulling it anteriorly and posteriorly up against the bougie but paying close attention not to narrow the incisura.  The next 4-7 loads were green with absorbable Veritas Rosa-Strips depending on the size of the stomach. Careful attention was made to stay about 1 cm from the esophagus. Areas of the reinforced staple line were oversewn with absorbable sutures as needed for bleeding or questionable staple lines. The clamp was withdrawn.  At this point, the gastrectomy specimen was withdrawn through the 12 mm trocar site incision. The specimen staple line was inspected and was intact.  The specimen was then sent off to pathology.  At this point, the sleeve was submerged under saline and using the ViSiGi bougie to insufflate the stomach, a leak test was performed.  This revealed the sleeve to be watertight, no air bubbles, no leak, and no bleeding seen from the staple lines and no significant abnormalities.  Irrigation fluid from the abdomen was then suctioned.  It was at this point during the course of the operation I felt the patient's fundus needed to be resected further.  The redundant segment of fundus that was retracted laterally and I stapled dissection off as well.  Required 2 firings of the stapler.  Another leak test was performed in the same manner and there was no evidence of leak.  The specimen will be sent with the gastric sleeve specimen.  The gastric sleeve staple line was then treated with 4 mL Tisseel fibrin glue. The fascia at the 12 mm trocar site incision was closed with a single 0 Vicryl suture in a figure-of-eight fashion placed under direct laparoscopic camera visualization with a suture passer and tying the knot extracorporeally.  The fascia in the area was infiltrated with local anesthesia. All incisions were then infiltrated with  local anesthetic. The remaining trocars were removed under direct camera visualization with no bleeding noted from their sites.  The abdomen was desufflated of gas. The skin in each incision was closed using 4-0 antibiotic impregnated Monocryl in a subcuticular fashion followed by Dermabond.  The patient tolerated the procedure well without complication and was taken to the recovery room in stable condition.  All sponge, needle and instrument counts were correct.     The hiatus was checked for a hernia and no hernia was detected.

## 2019-08-07 VITALS
SYSTOLIC BLOOD PRESSURE: 104 MMHG | HEIGHT: 68 IN | HEART RATE: 71 BPM | RESPIRATION RATE: 16 BRPM | DIASTOLIC BLOOD PRESSURE: 62 MMHG | TEMPERATURE: 98.5 F | BODY MASS INDEX: 44.41 KG/M2 | WEIGHT: 293 LBS | OXYGEN SATURATION: 98 %

## 2019-08-07 LAB
ALBUMIN SERPL-MCNC: 3.3 G/DL (ref 3.5–4.8)
ALBUMIN/GLOB SERPL: 1.2 G/DL (ref 1–1.7)
ALP SERPL-CCNC: 54 U/L (ref 32–91)
ALT SERPL W P-5'-P-CCNC: 45 U/L (ref 14–54)
ANION GAP SERPL CALCULATED.3IONS-SCNC: 13.6 MMOL/L (ref 5–15)
AST SERPL-CCNC: 39 U/L (ref 15–41)
BASOPHILS # BLD AUTO: 0 10*3/MM3 (ref 0–0.2)
BASOPHILS NFR BLD AUTO: 0.5 % (ref 0–1.5)
BILIRUB SERPL-MCNC: 0.9 MG/DL (ref 0.3–1.2)
BUN BLD-MCNC: 9 MG/DL (ref 8–20)
BUN/CREAT SERPL: 11.3 (ref 5.4–26.2)
CALCIUM SPEC-SCNC: 8.1 MG/DL (ref 8.9–10.3)
CHLORIDE SERPL-SCNC: 102 MMOL/L (ref 101–111)
CO2 SERPL-SCNC: 23 MMOL/L (ref 22–32)
CREAT BLD-MCNC: 0.8 MG/DL (ref 0.4–1)
DEPRECATED RDW RBC AUTO: 40.3 FL (ref 37–54)
EOSINOPHIL # BLD AUTO: 0 10*3/MM3 (ref 0–0.4)
EOSINOPHIL NFR BLD AUTO: 0 % (ref 0.3–6.2)
ERYTHROCYTE [DISTWIDTH] IN BLOOD BY AUTOMATED COUNT: 13.3 % (ref 12.3–15.4)
GFR SERPL CREATININE-BSD FRML MDRD: 86 ML/MIN/1.73
GLOBULIN UR ELPH-MCNC: 2.8 GM/DL (ref 2.5–3.8)
GLUCOSE BLD-MCNC: 99 MG/DL (ref 65–99)
HCT VFR BLD AUTO: 34 % (ref 34–46.6)
HGB BLD-MCNC: 11.4 G/DL (ref 12–15.9)
LAB AP CASE REPORT: NORMAL
LYMPHOCYTES # BLD AUTO: 2 10*3/MM3 (ref 0.7–3.1)
LYMPHOCYTES NFR BLD AUTO: 19.5 % (ref 19.6–45.3)
MAGNESIUM SERPL-MCNC: 2.1 MG/DL (ref 1.8–2.5)
MCH RBC QN AUTO: 28.7 PG (ref 26.6–33)
MCHC RBC AUTO-ENTMCNC: 33.5 G/DL (ref 31.5–35.7)
MCV RBC AUTO: 85.5 FL (ref 79–97)
MONOCYTES # BLD AUTO: 0.5 10*3/MM3 (ref 0.1–0.9)
MONOCYTES NFR BLD AUTO: 5.4 % (ref 5–12)
NEUTROPHILS # BLD AUTO: 7.5 10*3/MM3 (ref 1.7–7)
NEUTROPHILS NFR BLD AUTO: 74.6 % (ref 42.7–76)
NRBC BLD AUTO-RTO: 0.1 /100 WBC (ref 0–0.2)
PATH REPORT.FINAL DX SPEC: NORMAL
PATH REPORT.GROSS SPEC: NORMAL
PHOSPHATE SERPL-MCNC: 3.3 MG/DL (ref 2.4–4.7)
PLATELET # BLD AUTO: 369 10*3/MM3 (ref 140–450)
PMV BLD AUTO: 8.1 FL (ref 6–12)
POTASSIUM BLD-SCNC: 3.6 MMOL/L (ref 3.6–5.1)
PROT SERPL-MCNC: 6.1 G/DL (ref 6.1–7.9)
RBC # BLD AUTO: 3.98 10*6/MM3 (ref 3.77–5.28)
SODIUM BLD-SCNC: 135 MMOL/L (ref 136–144)
WBC NRBC COR # BLD: 10 10*3/MM3 (ref 3.4–10.8)

## 2019-08-07 PROCEDURE — 25010000002 CYANOCOBALAMIN PER 1000 MCG: Performed by: SURGERY

## 2019-08-07 PROCEDURE — 99024 POSTOP FOLLOW-UP VISIT: CPT | Performed by: SURGERY

## 2019-08-07 PROCEDURE — 80053 COMPREHEN METABOLIC PANEL: CPT | Performed by: SURGERY

## 2019-08-07 PROCEDURE — 83735 ASSAY OF MAGNESIUM: CPT | Performed by: SURGERY

## 2019-08-07 PROCEDURE — 25010000002 ONDANSETRON PER 1 MG: Performed by: SURGERY

## 2019-08-07 PROCEDURE — 25010000002 KETOROLAC TROMETHAMINE PER 15 MG: Performed by: SURGERY

## 2019-08-07 PROCEDURE — 84100 ASSAY OF PHOSPHORUS: CPT | Performed by: SURGERY

## 2019-08-07 PROCEDURE — 25010000002 METOCLOPRAMIDE PER 10 MG: Performed by: SURGERY

## 2019-08-07 PROCEDURE — 25010000002 THIAMINE PER 100 MG: Performed by: SURGERY

## 2019-08-07 PROCEDURE — 25010000002 ENOXAPARIN PER 10 MG: Performed by: SURGERY

## 2019-08-07 PROCEDURE — 85025 COMPLETE CBC W/AUTO DIFF WBC: CPT | Performed by: SURGERY

## 2019-08-07 RX ORDER — HYDROCODONE BITARTRATE AND ACETAMINOPHEN 5; 325 MG/1; MG/1
1 TABLET ORAL EVERY 6 HOURS PRN
Qty: 30 TABLET | Refills: 0 | Status: SHIPPED | OUTPATIENT
Start: 2019-08-07 | End: 2019-11-01 | Stop reason: SDUPTHER

## 2019-08-07 RX ORDER — ONDANSETRON 8 MG/1
8 TABLET, ORALLY DISINTEGRATING ORAL EVERY 8 HOURS PRN
Qty: 30 TABLET | Refills: 5 | Status: SHIPPED | OUTPATIENT
Start: 2019-08-07 | End: 2021-05-15

## 2019-08-07 RX ADMIN — METOCLOPRAMIDE 10 MG: 5 INJECTION, SOLUTION INTRAMUSCULAR; INTRAVENOUS at 09:04

## 2019-08-07 RX ADMIN — HYOSCYAMINE SULFATE 125 MCG: 0.12 TABLET ORAL at 09:03

## 2019-08-07 RX ADMIN — ONDANSETRON 4 MG: 2 INJECTION INTRAMUSCULAR; INTRAVENOUS at 09:05

## 2019-08-07 RX ADMIN — ENOXAPARIN SODIUM 40 MG: 40 INJECTION SUBCUTANEOUS at 09:03

## 2019-08-07 RX ADMIN — ONDANSETRON 4 MG: 2 INJECTION INTRAMUSCULAR; INTRAVENOUS at 03:19

## 2019-08-07 RX ADMIN — KETOROLAC TROMETHAMINE 30 MG: 30 INJECTION, SOLUTION INTRAMUSCULAR at 09:04

## 2019-08-07 RX ADMIN — FAMOTIDINE 20 MG: 10 INJECTION, SOLUTION INTRAVENOUS at 09:04

## 2019-08-07 RX ADMIN — CYANOCOBALAMIN 1000 MCG: 1000 INJECTION, SOLUTION INTRAMUSCULAR; SUBCUTANEOUS at 09:03

## 2019-08-07 RX ADMIN — METOCLOPRAMIDE 10 MG: 5 INJECTION, SOLUTION INTRAMUSCULAR; INTRAVENOUS at 03:19

## 2019-08-07 NOTE — DISCHARGE SUMMARY
"Discharge Summary    Patient name: Gwen Barnes    Medical record number: [id number]    Admission date: 8/6/2019  Discharge date:      Attending physician: Dr. Luna Keyes    Primary care physician: Gadiel Barrett MD    Referring physician: Luna Keyes MD  [address] [place] [address]    Condition on discharge: Stable    Primary Diagnoses:  Morbid obesity with co-morbidities    Operative Procedure:  Laparoscopic gastric sleeve     Gwen Barnes  is post op day one status post procedure listed. Patient denies shortness of air and lower extremity pain. Feels better than yesterday. No vomiting this am. Ambulating well and using incentive spirometer.          /62   Pulse 71   Temp 98.5 °F (36.9 °C)   Resp 16   Ht 172.7 cm (68\")   Wt (!) 139 kg (306 lb 3.5 oz)   LMP 07/23/2019 (Exact Date)   SpO2 98%   Breastfeeding? No   BMI 46.56 kg/m²     General:  alert, appears stated age and cooperative   Abdomen: soft, bowel sounds active, appropriate tenderness   Incision:   healing well, no drainage, no erythema, no hernia, no seroma, no swelling, no dehiscence, incision well approximated   Heart: Regular rate   Lungs: Clear to auscultation bilaterally     I reviewed the patient's new clinical results.     Lab Results (last 24 hours)     Procedure Component Value Units Date/Time    Comprehensive Metabolic Panel [[id number]]  (Abnormal) Collected:  08/07/19 0220    Specimen:  Blood Updated:  08/07/19 0359     Glucose 99 mg/dL      BUN 9 mg/dL      Creatinine 0.80 mg/dL      Sodium 135 mmol/L      Potassium 3.6 mmol/L      Chloride 102 mmol/L      CO2 23.0 mmol/L      Calcium 8.1 mg/dL      Total Protein 6.1 g/dL      Albumin 3.30 g/dL      ALT (SGPT) 45 U/L      AST (SGOT) 39 U/L      Alkaline Phosphatase 54 U/L      Total Bilirubin 0.9 mg/dL      eGFR Non African Amer 86 mL/min/1.73      Globulin 2.8 gm/dL      A/G Ratio 1.2 g/dL      BUN/Creatinine Ratio 11.3     Anion Gap 13.6 mmol/L     " Phosphorus [[id number]]  (Normal) Collected:  08/07/19 0220    Specimen:  Blood Updated:  08/07/19 0359     Phosphorus 3.3 mg/dL     Magnesium [[id number]]  (Normal) Collected:  08/07/19 0220    Specimen:  Blood Updated:  08/07/19 0359     Magnesium 2.1 mg/dL     CBC & Differential [[id number]] Collected:  08/07/19 0220    Specimen:  Blood Updated:  08/07/19 0330    Narrative:       The following orders were created for panel order CBC & Differential.  Procedure                               Abnormality         Status                     ---------                               -----------         ------                     CBC Auto Differential[[id number]]        Abnormal            Final result                 Please view results for these tests on the individual orders.    CBC Auto Differential [[id number]]  (Abnormal) Collected:  08/07/19 0220    Specimen:  Blood Updated:  08/07/19 0330     WBC 10.00 10*3/mm3      RBC 3.98 10*6/mm3      Hemoglobin 11.4 g/dL      Hematocrit 34.0 %      MCV 85.5 fL      MCH 28.7 pg      MCHC 33.5 g/dL      RDW 13.3 %      RDW-SD 40.3 fl      MPV 8.1 fL      Platelets 369 10*3/mm3      Neutrophil % 74.6 %      Lymphocyte % 19.5 %      Monocyte % 5.4 %      Eosinophil % 0.0 %      Basophil % 0.5 %      Neutrophils, Absolute 7.50 10*3/mm3      Lymphocytes, Absolute 2.00 10*3/mm3      Monocytes, Absolute 0.50 10*3/mm3      Eosinophils, Absolute 0.00 10*3/mm3      Basophils, Absolute 0.00 10*3/mm3      nRBC 0.1 /100 WBC     Basic Metabolic Panel [[id number]]  (Abnormal) Collected:  08/06/19 1313    Specimen:  Blood Updated:  08/06/19 1406     Glucose 163 mg/dL      BUN 13 mg/dL      Creatinine 1.00 mg/dL      Sodium 132 mmol/L      Potassium 3.7 mmol/L      Chloride 97 mmol/L      CO2 22.0 mmol/L      Calcium 8.3 mg/dL      eGFR Non African Amer 67 mL/min/1.73      BUN/Creatinine Ratio 13.0     Anion Gap 16.7 mmol/L     Tissue Pathology Exam [[id number]] Collected:  08/06/19 1037    Specimen:   Tissue from Stomach, Greater curvature Updated:  08/06/19 1256    Pregnancy, Urine - Urine, Clean Catch [[id number]]  (Normal) Collected:  08/06/19 0843    Specimen:  Urine, Clean Catch Updated:  08/06/19 0922     HCG, Urine QL Negative             Assessment:      Doing well postoperatively.      Plan:   1. Continue Stage 1 diet  2. Continue with ambulation and Incentive spirometry  3. Plan for d/c home      Hospital Course: The patient is a very pleasant 29 y.o. female that was admitted to the hospital with with morbid obesity underwent a laparoscopic gastric sleeve.  The next morning she was able to tolerate liquids and was ambulating and her vital signs and labs were stable.  She is discharged home with follow-up in my office next week.      Discharge medications:      Discharge Medications      New Medications      Instructions Start Date   HYDROcodone-acetaminophen 5-325 MG per tablet  Commonly known as:  NORCO   1 tablet, Oral, Every 6 Hours PRN      ondansetron ODT 8 MG disintegrating tablet  Commonly known as:  ZOFRAN ODT   8 mg, Oral, Every 8 Hours PRN         Continue These Medications      Instructions Start Date   acetaminophen 325 MG tablet  Commonly known as:  TYLENOL   650 mg, Oral, Every 6 Hours PRN      ursodiol 250 MG tablet  Commonly known as:  ACTIGALL   250 mg, Oral, 2 Times Daily             Discharge instructions:  Per Bariatric manual; per our protocol      Follow-up appointment: Follow up with Dr. Keyes in the office as scheduled.  If not already scheduled call for appointment at [phone]

## 2019-08-07 NOTE — PAYOR COMM NOTE
"[id number] - REF #    DC NOTIFICATION - DC ROUTINE TO HOME NO NEEDS 8/07/2019    Chico Lyons (29 y.o. Female)     Date of Birth Social Security Number Address Home Phone MRN    1990  [id number] [place] [address] [phone] [id number]    [employer] Marital Status          [facility] [place] [facility]       Admission Date Admission Type Admitting Provider Attending Provider Department, Room/Bed    8/6/19 Elective Luna Keyes MD  [facility] SURGICAL INPATIENT, 4101/1    Discharge Date Discharge Disposition Discharge Destination        8/7/2019 Home or Self Care              Attending Provider:  (none)   Allergies:  No Known Allergies    Isolation:  None   Infection:  None   Code Status:  Prior    Ht:  172.7 cm (68\")   Wt:  139 kg (306 lb 3.5 oz)    Admission Cmt:  None   Principal Problem:  Obesity, Class III, BMI 40-49.9 (morbid obesity) (CMS/[facility]) [E66.01] More...                 Active Insurance as of 8/6/2019     Primary Coverage     Payor Plan Insurance Group Employer/Plan Group    [place]      Payor Plan Address Payor Plan Phone Number Payor Plan Fax Number Effective Dates    [address]   8/6/2019 - None Entered    [place] [address]       Subscriber Name Subscriber Birth Date Member ID       CHICO LYONS 1990 [id number]                 Emergency Contacts      (Rel.) Home Phone Work Phone Mobile Phone    JESSIKA LYONS (Father) [phone] -- --    IVON BLACKBURN (Mother) -- -- [phone]               Operative/Procedure Notes (last 48 hours) (Notes from 8/5/2019  2:10 PM through 8/7/2019  2:10 PM)      Luna Keyes MD at 8/6/2019 10:25 AM          PREOPERATIVE DIAGNOSIS:  Morbid obesity with multiple comorbidities as referenced in the most recent history and physical.    POSTOPERATIVE DIAGNOSIS:  Morbid obesity with multiple comorbidities as referenced in the most recent history and physical.    PROCEDURES PERFORMED:  1.  Laparoscopic sleeve " gastrectomy.  2.  Tisseel application.     SURGEON:  Luna Keyes MD FACS, [facility]    ASSISTANT:  Rosetta Paredes RN    Surgery assisted and facilitated by a certified physician assistant, who directly resulted in a decreased operative time, anesthetic time, wound exposure, and possibly of an operative wound infection, thereby decreasing patient morbidity and ultimately total expenditures.  The surgical assistant assisted in placement of trochars, take down of the gastrocolic omentum, short gastric vessels and dissection at the angle of His.  Also assisted in retraction of the stomach during stapling so as not to kink the gastric sleeve.  Also assisted in removing of the gastric specimen, closure of the fascial defect as well as closure of the skin incisions.    ANESTHESIA:  General endotracheal.    ESTIMATED BLOOD LOSS:   Less than 25 mL unless dictated below.    FLUIDS:  Crystalloids.    SPECIMENS:  Gastric remnant    DRAINS:  None.    COUNTS:  Correct.    COMPLICATIONS:  None.    INDICATIONS:  This patient with morbid obesity and associated comorbidities presents for elective laparoscopic, possible open sleeve gastrectomy.  The patient has received medical clearance to proceed.  The patient has undergone our extensive educational process and consent process and wishes to proceed.    EGD demonstrated no hiatal hernia.    DESCRIPTION OF PROCEDURE:  The patient was brought to the operating room and placed supine upon the operating room table. SCD hose were placed.  The patient underwent uneventful general endotracheal anesthesia per the anesthesiology staff. The abdomen was prepped with ChloraPrep and draped in the usual sterile fashion.  An Ioban was used as well if not allergic.  Depending on the technique to size the gastric sleeve, anesthesia staff either passed a 18-Tuvaluan gastric tube or a 36-Tuvaluan ViSiGi bougie into the stomach to decompress and then was pulled back to the esophagus.     A 5-10 mm transverse  incision was made a few centimeters above and to the left of the umbilicus and the peritoneal cavity entered under direct camera visualization using a 5 or 10 mm 0° laparoscope and an Optiview trocar.  The abdomen was then insufflated to a pressure of 15-16 mmHg with CO2 gas.  Exploratory laparoscopy revealed no evidence of injury from the entrance technique and no significant abnormalities unless addendum dictated below.  An angled laparoscope was then used.  The patient was placed in reverse Trendelenburg position.  Under direct camera visualization a 5 mm trocar was placed in the left lateral subcostal position.  A 12 mm trocar was placed in the right midabdominal position.  A 12 mm trocar was placed in the supraumbilical position. A Seb retractor was placed through an epigastric incision and used to elevate the left lobe of the liver.  The fat pad was elevated and the left coy exposed.  At this point, approximately half-way along the greater curvature, the gastrocolic omentum was divided with the Enseal and this proceeded superiorly to the angle of His taking down the short gastric vessels.  All posterior attachments of the lesser sac and posterior aspect of the stomach to the pancreas were taken down as well.  The left coy was exposed along its length.  Dissection then proceeded medially taking down the greater curvature with an Enseal until just proximal to the pylorus.  The 18-Estonian orogastric tube was passed back down into the stomach by anesthesia a Standard Bariatric Clamp was then positioned with the tip 1 cm to the left of the GE junction, 3cm from angularis incisura and 6 cm proximal to the pyloris.  The 1st load was a green tissue load on the Mendenhall Flex stapler with Veritas Rosa-Strip and this was placed 6 cm proximal to the pylorus and up against the clamp pulling it anteriorly and posteriorly up against the bougie but paying close attention not to narrow the incisura.  The next 4-7 loads  were green with absorbable Veritas Rosa-Strips depending on the size of the stomach. Careful attention was made to stay about 1 cm from the esophagus. Areas of the reinforced staple line were oversewn with absorbable sutures as needed for bleeding or questionable staple lines. The clamp was withdrawn.  At this point, the gastrectomy specimen was withdrawn through the 12 mm trocar site incision. The specimen staple line was inspected and was intact.  The specimen was then sent off to pathology.  At this point, the sleeve was submerged under saline and using the ViSiGi bougie to insufflate the stomach, a leak test was performed.  This revealed the sleeve to be watertight, no air bubbles, no leak, and no bleeding seen from the staple lines and no significant abnormalities.  Irrigation fluid from the abdomen was then suctioned.  It was at this point during the course of the operation I felt the patient's fundus needed to be resected further.  The redundant segment of fundus that was retracted laterally and I stapled dissection off as well.  Required 2 firings of the stapler.  Another leak test was performed in the same manner and there was no evidence of leak.  The specimen will be sent with the gastric sleeve specimen.  The gastric sleeve staple line was then treated with 4 mL Tisseel fibrin glue. The fascia at the 12 mm trocar site incision was closed with a single 0 Vicryl suture in a figure-of-eight fashion placed under direct laparoscopic camera visualization with a suture passer and tying the knot extracorporeally.  The fascia in the area was infiltrated with local anesthesia. All incisions were then infiltrated with local anesthetic. The remaining trocars were removed under direct camera visualization with no bleeding noted from their sites.  The abdomen was desufflated of gas. The skin in each incision was closed using 4-0 antibiotic impregnated Monocryl in a subcuticular fashion followed by Dermabond.  The  "patient tolerated the procedure well without complication and was taken to the recovery room in stable condition.  All sponge, needle and instrument counts were correct.     The hiatus was checked for a hernia and no hernia was detected.        Electronically signed by Luna Keyes MD at 8/6/2019 11:44 AM          Discharge Summary      Luna Keyes MD at 8/7/2019  8:54 AM          Discharge Summary    Patient name: Gwen Barnes    Medical record number: [id number]    Admission date: 8/6/2019  Discharge date:      Attending physician: Dr. Luna Keyes    Primary care physician: Gadiel Barrett MD    Referring physician: Luna Keyes MD  [address] [place] [address] [place] [address]    Condition on discharge: Stable    Primary Diagnoses:  Morbid obesity with co-morbidities    Operative Procedure:  Laparoscopic gastric sleeve     Gwen Barnes  is post op day one status post procedure listed. Patient denies shortness of air and lower extremity pain. Feels better than yesterday. No vomiting this am. Ambulating well and using incentive spirometer.          /62   Pulse 71   Temp 98.5 °F (36.9 °C)   Resp 16   Ht 172.7 cm (68\")   Wt (!) 139 kg (306 lb 3.5 oz)   LMP 07/23/2019 (Exact Date)   SpO2 98%   Breastfeeding? No   BMI 46.56 kg/m²      General:  alert, appears stated age and cooperative   Abdomen: soft, bowel sounds active, appropriate tenderness   Incision:   healing well, no drainage, no erythema, no hernia, no seroma, no swelling, no dehiscence, incision well approximated   Heart: Regular rate   Lungs: Clear to auscultation bilaterally     I reviewed the patient's new clinical results.     Lab Results (last 24 hours)     Procedure Component Value Units Date/Time    Comprehensive Metabolic Panel [[id number]]  (Abnormal) Collected:  08/07/19 0220    Specimen:  Blood Updated:  08/07/19 0359     Glucose 99 mg/dL      BUN 9 mg/dL      Creatinine 0.80 mg/dL      Sodium 135 mmol/L      Potassium 3.6 " mmol/L      Chloride 102 mmol/L      CO2 23.0 mmol/L      Calcium 8.1 mg/dL      Total Protein 6.1 g/dL      Albumin 3.30 g/dL      ALT (SGPT) 45 U/L      AST (SGOT) 39 U/L      Alkaline Phosphatase 54 U/L      Total Bilirubin 0.9 mg/dL      eGFR Non African Amer 86 mL/min/1.73      Globulin 2.8 gm/dL      A/G Ratio 1.2 g/dL      BUN/Creatinine Ratio 11.3     Anion Gap 13.6 mmol/L     Phosphorus [[id number]]  (Normal) Collected:  08/07/19 0220    Specimen:  Blood Updated:  08/07/19 0359     Phosphorus 3.3 mg/dL     Magnesium [[id number]]  (Normal) Collected:  08/07/19 0220    Specimen:  Blood Updated:  08/07/19 0359     Magnesium 2.1 mg/dL     CBC & Differential [[id number]] Collected:  08/07/19 0220    Specimen:  Blood Updated:  08/07/19 0330    Narrative:       The following orders were created for panel order CBC & Differential.  Procedure                               Abnormality         Status                     ---------                               -----------         ------                     CBC Auto Differential[[id number]]        Abnormal            Final result                 Please view results for these tests on the individual orders.    CBC Auto Differential [[id number]]  (Abnormal) Collected:  08/07/19 0220    Specimen:  Blood Updated:  08/07/19 0330     WBC 10.00 10*3/mm3      RBC 3.98 10*6/mm3      Hemoglobin 11.4 g/dL      Hematocrit 34.0 %      MCV 85.5 fL      MCH 28.7 pg      MCHC 33.5 g/dL      RDW 13.3 %      RDW-SD 40.3 fl      MPV 8.1 fL      Platelets 369 10*3/mm3      Neutrophil % 74.6 %      Lymphocyte % 19.5 %      Monocyte % 5.4 %      Eosinophil % 0.0 %      Basophil % 0.5 %      Neutrophils, Absolute 7.50 10*3/mm3      Lymphocytes, Absolute 2.00 10*3/mm3      Monocytes, Absolute 0.50 10*3/mm3      Eosinophils, Absolute 0.00 10*3/mm3      Basophils, Absolute 0.00 10*3/mm3      nRBC 0.1 /100 WBC     Basic Metabolic Panel [[id number]]  (Abnormal) Collected:  08/06/19 1313    Specimen:   Blood Updated:  08/06/19 1406     Glucose 163 mg/dL      BUN 13 mg/dL      Creatinine 1.00 mg/dL      Sodium 132 mmol/L      Potassium 3.7 mmol/L      Chloride 97 mmol/L      CO2 22.0 mmol/L      Calcium 8.3 mg/dL      eGFR Non African Amer 67 mL/min/1.73      BUN/Creatinine Ratio 13.0     Anion Gap 16.7 mmol/L     Tissue Pathology Exam [[id number]] Collected:  08/06/19 1037    Specimen:  Tissue from Stomach, Greater curvature Updated:  08/06/19 1256    Pregnancy, Urine - Urine, Clean Catch [[id number]]  (Normal) Collected:  08/06/19 0843    Specimen:  Urine, Clean Catch Updated:  08/06/19 0922     HCG, Urine QL Negative             Assessment:      Doing well postoperatively.      Plan:   1. Continue Stage 1 diet  2. Continue with ambulation and Incentive spirometry  3. Plan for d/c home      Hospital Course: The patient is a very pleasant 29 y.o. female that was admitted to the hospital with with morbid obesity underwent a laparoscopic gastric sleeve.  The next morning she was able to tolerate liquids and was ambulating and her vital signs and labs were stable.  She is discharged home with follow-up in my office next week.      Discharge medications:      Discharge Medications      New Medications      Instructions Start Date   HYDROcodone-acetaminophen 5-325 MG per tablet  Commonly known as:  NORCO   1 tablet, Oral, Every 6 Hours PRN      ondansetron ODT 8 MG disintegrating tablet  Commonly known as:  ZOFRAN ODT   8 mg, Oral, Every 8 Hours PRN         Continue These Medications      Instructions Start Date   acetaminophen 325 MG tablet  Commonly known as:  TYLENOL   650 mg, Oral, Every 6 Hours PRN      ursodiol 250 MG tablet  Commonly known as:  ACTIGALL   250 mg, Oral, 2 Times Daily             Discharge instructions:  Per Bariatric manual; per our protocol      Follow-up appointment: Follow up with Dr. Keyes in the office as scheduled.  If not already scheduled call for appointment at  [phone]    Electronically signed by Luna Keyes MD at 8/7/2019  8:55 AM

## 2019-08-07 NOTE — PAYOR COMM NOTE
"DC NOTIFICATION - DC ROUTINE TO HOME NO NEEDS 8/07/19    REF [id number]    Chico Barnes (29 y.o. Female)     Date of Birth Social Security Number Address Home Phone MRN    [id number] [address] [facility] [place] [address] [phone] [id number]    [facility] Mar[employer] Status          [facility] Single       Admission Date Admission Type Admitting Provider Attending Provider Department, Room/Bed    8/6/19 Elective Luna Keyes MD  [facility], 4101/1    Discharge Date Discharge Disposition Discharge Destination        8/7/2019 Home or Self Care              Attending Provider:  (none)   Allergies:  No Known Allergies    Isolation:  None   Infection:  None   Code Status:  Prior    Ht:  172.7 cm (68\")   Wt:  139 kg (306 lb 3.5 oz)    Admission Cmt:  None   Principal Problem:  Obesity, Class III, BMI 40-49.9 (morbid obesity) (CMS/[facility]) [E66.01] More...                 Active Insurance as of 8/6/2019     Primary Coverage     Payor Plan Insurance Group Employer/Plan Group    [place]      Payor Plan Address Payor Plan Phone Number Payor Plan Fax Number Effective Dates    [address]   8/6/2019 - None Entered    [place] [address]       Subscriber Name Subscriber Birth Date Member ID       CHICO BARNES 1990 [id number]                 Emergency Contacts      (Rel.) Home Phone Work Phone Mobile Phone    JESSIKA BARNES (Father) [phone] -- --    IVON BLACKBURN (Mother) -- -- [phone]               Discharge Summary      Luna Keyes MD at 8/7/2019  8:54 AM          Discharge Summary    Patient name: Chico Barnes    Medical record number: [id number]    Admission date: 8/6/2019  Discharge date:      Attending physician: Dr. Luna Keyes    Primary care physician: Gadiel Barrett MD    Referring physician: Luna Keyes MD  [address] [place] [address], [place] [address]    Condition on discharge: Stable    Primary Diagnoses:  Morbid obesity with " "co-morbidities    Operative Procedure:  Laparoscopic gastric sleeve     Gwen Barnes  is post op day one status post procedure listed. Patient denies shortness of air and lower extremity pain. Feels better than yesterday. No vomiting this am. Ambulating well and using incentive spirometer.          /62   Pulse 71   Temp 98.5 °F (36.9 °C)   Resp 16   Ht 172.7 cm (68\")   Wt (!) 139 kg (306 lb 3.5 oz)   LMP 07/23/2019 (Exact Date)   SpO2 98%   Breastfeeding? No   BMI 46.56 kg/m²      General:  alert, appears stated age and cooperative   Abdomen: soft, bowel sounds active, appropriate tenderness   Incision:   healing well, no drainage, no erythema, no hernia, no seroma, no swelling, no dehiscence, incision well approximated   Heart: Regular rate   Lungs: Clear to auscultation bilaterally     I reviewed the patient's new clinical results.     Lab Results (last 24 hours)     Procedure Component Value Units Date/Time    Comprehensive Metabolic Panel [[id number]]  (Abnormal) Collected:  08/07/19 0220    Specimen:  Blood Updated:  08/07/19 0359     Glucose 99 mg/dL      BUN 9 mg/dL      Creatinine 0.80 mg/dL      Sodium 135 mmol/L      Potassium 3.6 mmol/L      Chloride 102 mmol/L      CO2 23.0 mmol/L      Calcium 8.1 mg/dL      Total Protein 6.1 g/dL      Albumin 3.30 g/dL      ALT (SGPT) 45 U/L      AST (SGOT) 39 U/L      Alkaline Phosphatase 54 U/L      Total Bilirubin 0.9 mg/dL      eGFR Non African Amer 86 mL/min/1.73      Globulin 2.8 gm/dL      A/G Ratio 1.2 g/dL      BUN/Creatinine Ratio 11.3     Anion Gap 13.6 mmol/L     Phosphorus [[id number]]  (Normal) Collected:  08/07/19 0220    Specimen:  Blood Updated:  08/07/19 0359     Phosphorus 3.3 mg/dL     Magnesium [[id number]]  (Normal) Collected:  08/07/19 0220    Specimen:  Blood Updated:  08/07/19 0359     Magnesium 2.1 mg/dL     CBC & Differential [[id number]] Collected:  08/07/19 0220    Specimen:  Blood Updated:  08/07/19 0330    Narrative:       The " following orders were created for panel order CBC & Differential.  Procedure                               Abnormality         Status                     ---------                               -----------         ------                     CBC Auto Differential[[id number]]        Abnormal            Final result                 Please view results for these tests on the individual orders.    CBC Auto Differential [[id number]]  (Abnormal) Collected:  08/07/19 0220    Specimen:  Blood Updated:  08/07/19 0330     WBC 10.00 10*3/mm3      RBC 3.98 10*6/mm3      Hemoglobin 11.4 g/dL      Hematocrit 34.0 %      MCV 85.5 fL      MCH 28.7 pg      MCHC 33.5 g/dL      RDW 13.3 %      RDW-SD 40.3 fl      MPV 8.1 fL      Platelets 369 10*3/mm3      Neutrophil % 74.6 %      Lymphocyte % 19.5 %      Monocyte % 5.4 %      Eosinophil % 0.0 %      Basophil % 0.5 %      Neutrophils, Absolute 7.50 10*3/mm3      Lymphocytes, Absolute 2.00 10*3/mm3      Monocytes, Absolute 0.50 10*3/mm3      Eosinophils, Absolute 0.00 10*3/mm3      Basophils, Absolute 0.00 10*3/mm3      nRBC 0.1 /100 WBC     Basic Metabolic Panel [[id number]]  (Abnormal) Collected:  08/06/19 1313    Specimen:  Blood Updated:  08/06/19 1406     Glucose 163 mg/dL      BUN 13 mg/dL      Creatinine 1.00 mg/dL      Sodium 132 mmol/L      Potassium 3.7 mmol/L      Chloride 97 mmol/L      CO2 22.0 mmol/L      Calcium 8.3 mg/dL      eGFR Non African Amer 67 mL/min/1.73      BUN/Creatinine Ratio 13.0     Anion Gap 16.7 mmol/L     Tissue Pathology Exam [[id number]] Collected:  08/06/19 1037    Specimen:  Tissue from Stomach, Greater curvature Updated:  08/06/19 1256    Pregnancy, Urine - Urine, Clean Catch [[id number]]  (Normal) Collected:  08/06/19 0843    Specimen:  Urine, Clean Catch Updated:  08/06/19 0922     HCG, Urine QL Negative             Assessment:      Doing well postoperatively.      Plan:   1. Continue Stage 1 diet  2. Continue with ambulation and Incentive  spirometry  3. Plan for d/c home      Hospital Course: The patient is a very pleasant 29 y.o. female that was admitted to the hospital with with morbid obesity underwent a laparoscopic gastric sleeve.  The next morning she was able to tolerate liquids and was ambulating and her vital signs and labs were stable.  She is discharged home with follow-up in my office next week.      Discharge medications:      Discharge Medications      New Medications      Instructions Start Date   HYDROcodone-acetaminophen 5-325 MG per tablet  Commonly known as:  NORCO   1 tablet, Oral, Every 6 Hours PRN      ondansetron ODT 8 MG disintegrating tablet  Commonly known as:  ZOFRAN ODT   8 mg, Oral, Every 8 Hours PRN         Continue These Medications      Instructions Start Date   acetaminophen 325 MG tablet  Commonly known as:  TYLENOL   650 mg, Oral, Every 6 Hours PRN      ursodiol 250 MG tablet  Commonly known as:  ACTIGALL   250 mg, Oral, 2 Times Daily             Discharge instructions:  Per Bariatric manual; per our protocol      Follow-up appointment: Follow up with Dr. Keyes in the office as scheduled.  If not already scheduled call for appointment at [phone]    Electronically signed by Luna Keyes MD at 8/7/2019  8:55 AM

## 2019-08-07 NOTE — PLAN OF CARE
Problem: Patient Care Overview  Goal: Plan of Care Review  Outcome: Ongoing (interventions implemented as appropriate)   08/07/19 5503   Coping/Psychosocial   Plan of Care Reviewed With patient   OTHER   Outcome Summary Patient has had some pain tonight that has been controlled with scheduled toradol. Patient has been walking Q2h. Patient should go home today 8/7/19   Plan of Care Review   Progress improving       Problem: Bariatric Surgery (Adult,Pediatric)  Goal: Signs and Symptoms of Listed Potential Problems Will be Absent, Minimized or Managed (Bariatric Surgery)  Outcome: Ongoing (interventions implemented as appropriate)    Goal: Anesthesia/Sedation Recovery  Outcome: Outcome(s) achieved Date Met: 08/07/19

## 2019-08-08 ENCOUNTER — READMISSION MANAGEMENT (OUTPATIENT)
Dept: CALL CENTER | Facility: HOSPITAL | Age: 29
End: 2019-08-08

## 2019-08-08 NOTE — PROGRESS NOTES
Case Management Discharge Note    Final Note: Routine D/C Home              Final Discharge Disposition Code: 01 - home or self-care

## 2019-08-08 NOTE — OUTREACH NOTE
Prep Survey      Responses   Facility patient discharged from?  [facility]   Is patient eligible?  Yes   Discharge diagnosis  Morbid obesity with co-morbidities   Does the patient have one of the following disease processes/diagnoses(primary or secondary)?  General Surgery   Does the patient have Home health ordered?  No   Is there a DME ordered?  No   Medication alerts for this patient  Norco, zofran   Prep survey completed?  Yes          Anayeli Henao LPN

## 2019-08-09 ENCOUNTER — READMISSION MANAGEMENT (OUTPATIENT)
Dept: CALL CENTER | Facility: HOSPITAL | Age: 29
End: 2019-08-09

## 2019-08-09 NOTE — OUTREACH NOTE
General Surgery Week 1 Survey      Responses   Facility patient discharged from?  [facility]   Does the patient have one of the following disease processes/diagnoses(primary or secondary)?  General Surgery   Is there a successful TCM telephone encounter documented?  No   Week 1 attempt successful?  Yes   Call start time  0937   Call end time  0942   Discharge diagnosis  Morbid obesity with co-morbidities   Medication alerts for this patient  Norco zofran   Meds reviewed with patient/caregiver?  Yes   Is the patient having any side effects they believe may be caused by any medication additions or changes?  No   Does the patient have all medications related to this admission filled (includes all antibiotics, pain medications, etc.)  Yes   Is the patient taking all medications as directed (includes completed medication regime)?  Yes   Does the patient have a follow up appointment scheduled with their surgeon?  Yes   Has the patient kept scheduled appointments due by today?  N/A   Has home health visited the patient within 72 hours of discharge?  N/A   Psychosocial issues?  No   Did the patient receive a copy of their discharge instructions?  Yes   Nursing interventions  Reviewed instructions with patient   What is the patient's perception of their health status since discharge?  Improving   Nursing interventions  Nurse provided patient education   Is the patient /caregiver able to teach back basic post-op care?  Continue use of incentive spirometry at least 1 week post discharge, Practice 'cough and deep breath', Drive as instructed by MD in discharge instructions, Take showers only when approved by MD-sponge bathe until then, Lifting as instructed by MD in discharge instructions, No tub bath, swimming, or hot tub until instructed by MD, Keep incision areas clean,dry and protected, Do not remove steri-strips   Is the patient/caregiver able to teach back signs and symptoms of incisional infection?  Increased redness,  swelling or pain at the incisonal site, Increased drainage or bleeding, Incisional warmth, Pus or odor from incision, Fever   Is the patient/caregiver able to teach back steps to recovery at home?  Set small, achievable goals for return to baseline health, Rest and rebuild strength, gradually increase activity, Weigh daily, Eat a well-balance diet, Make a list of questions for surgeon's appointment   Is the patient/caregiver able to teach back the hierarchy of who to call/visit for symptoms/problems? PCP, Specialist, Home health nurse, Urgent Care, ED, 911  Yes   Additional teach back comments  She is having some belly pain, and has her gas bubble, encouraged protein, water, exercise.   Week 1 call completed?  Yes          Ashley Calderón RN

## 2019-08-12 ENCOUNTER — OFFICE VISIT (OUTPATIENT)
Dept: BARIATRICS/WEIGHT MGMT | Facility: CLINIC | Age: 29
End: 2019-08-12

## 2019-08-12 ENCOUNTER — LAB (OUTPATIENT)
Dept: LAB | Facility: HOSPITAL | Age: 29
End: 2019-08-12

## 2019-08-12 VITALS
BODY MASS INDEX: 43.4 KG/M2 | WEIGHT: 293 LBS | OXYGEN SATURATION: 97 % | HEART RATE: 84 BPM | HEIGHT: 69 IN | TEMPERATURE: 97.9 F

## 2019-08-12 DIAGNOSIS — E66.01 OBESITY, CLASS III, BMI 40-49.9 (MORBID OBESITY) (HCC): ICD-10-CM

## 2019-08-12 DIAGNOSIS — E66.01 OBESITY, CLASS III, BMI 40-49.9 (MORBID OBESITY) (HCC): Primary | ICD-10-CM

## 2019-08-12 LAB
ALBUMIN SERPL-MCNC: 4.4 G/DL (ref 3.5–4.8)
ALBUMIN/GLOB SERPL: 1.1 G/DL (ref 1–1.7)
ALP SERPL-CCNC: 107 U/L (ref 32–91)
ALT SERPL W P-5'-P-CCNC: 63 U/L (ref 14–54)
ANION GAP SERPL CALCULATED.3IONS-SCNC: 22.3 MMOL/L (ref 5–15)
AST SERPL-CCNC: 52 U/L (ref 15–41)
BASOPHILS # BLD AUTO: 0.1 10*3/MM3 (ref 0–0.2)
BASOPHILS NFR BLD AUTO: 1 % (ref 0–1.5)
BILIRUB SERPL-MCNC: 1.1 MG/DL (ref 0.3–1.2)
BUN BLD-MCNC: 16 MG/DL (ref 8–20)
BUN/CREAT SERPL: 13.3 (ref 5.4–26.2)
CALCIUM SPEC-SCNC: 9.1 MG/DL (ref 8.9–10.3)
CHLORIDE SERPL-SCNC: 97 MMOL/L (ref 101–111)
CO2 SERPL-SCNC: 18 MMOL/L (ref 22–32)
CREAT BLD-MCNC: 1.2 MG/DL (ref 0.4–1)
DEPRECATED RDW RBC AUTO: 40.7 FL (ref 37–54)
EOSINOPHIL # BLD AUTO: 0.1 10*3/MM3 (ref 0–0.4)
EOSINOPHIL NFR BLD AUTO: 0.9 % (ref 0.3–6.2)
ERYTHROCYTE [DISTWIDTH] IN BLOOD BY AUTOMATED COUNT: 13.4 % (ref 12.3–15.4)
GFR SERPL CREATININE-BSD FRML MDRD: 54 ML/MIN/1.73
GLOBULIN UR ELPH-MCNC: 4 GM/DL (ref 2.5–3.8)
GLUCOSE BLD-MCNC: 77 MG/DL (ref 65–99)
HCT VFR BLD AUTO: 42.8 % (ref 34–46.6)
HGB BLD-MCNC: 14.3 G/DL (ref 12–15.9)
IRON 24H UR-MRATE: 39 MCG/DL (ref 28–170)
LYMPHOCYTES # BLD AUTO: 2.6 10*3/MM3 (ref 0.7–3.1)
LYMPHOCYTES NFR BLD AUTO: 25.7 % (ref 19.6–45.3)
MCH RBC QN AUTO: 28.8 PG (ref 26.6–33)
MCHC RBC AUTO-ENTMCNC: 33.4 G/DL (ref 31.5–35.7)
MCV RBC AUTO: 86.2 FL (ref 79–97)
MONOCYTES # BLD AUTO: 0.5 10*3/MM3 (ref 0.1–0.9)
MONOCYTES NFR BLD AUTO: 4.8 % (ref 5–12)
NEUTROPHILS # BLD AUTO: 6.9 10*3/MM3 (ref 1.7–7)
NEUTROPHILS NFR BLD AUTO: 67.6 % (ref 42.7–76)
NRBC BLD AUTO-RTO: 0.8 /100 WBC (ref 0–0.2)
PLATELET # BLD AUTO: 521 10*3/MM3 (ref 140–450)
PMV BLD AUTO: 8 FL (ref 6–12)
POTASSIUM BLD-SCNC: 4.3 MMOL/L (ref 3.6–5.1)
PREALB SERPL-MCNC: 20 MG/DL (ref 16–38)
PROT SERPL-MCNC: 8.4 G/DL (ref 6.1–7.9)
RBC # BLD AUTO: 4.96 10*6/MM3 (ref 3.77–5.28)
SODIUM BLD-SCNC: 133 MMOL/L (ref 136–144)
WBC NRBC COR # BLD: 10.2 10*3/MM3 (ref 3.4–10.8)

## 2019-08-12 PROCEDURE — 83540 ASSAY OF IRON: CPT

## 2019-08-12 PROCEDURE — 80053 COMPREHEN METABOLIC PANEL: CPT

## 2019-08-12 PROCEDURE — 84425 ASSAY OF VITAMIN B-1: CPT

## 2019-08-12 PROCEDURE — 36415 COLL VENOUS BLD VENIPUNCTURE: CPT

## 2019-08-12 PROCEDURE — 85025 COMPLETE CBC W/AUTO DIFF WBC: CPT

## 2019-08-12 PROCEDURE — 99024 POSTOP FOLLOW-UP VISIT: CPT | Performed by: SURGERY

## 2019-08-12 PROCEDURE — 83921 ORGANIC ACID SINGLE QUANT: CPT

## 2019-08-12 PROCEDURE — 84134 ASSAY OF PREALBUMIN: CPT

## 2019-08-12 NOTE — PROGRESS NOTES
[facility] GROUP WEIGHT MANAGEMENT  [address] [place] [address]  [address] [place] [address]  Dept: [phone]  8/12/2019      Gwen Barnes.  [id number]  [id number]  1990  female      Chief Complaint   Patient presents with   • Post-op     1wk GS 8/8/19; consult 320#       BH Post-Op Bariatric Surgery:   Gwen Barnes is status post laparopscopic Laparoscopic Sleeve procedure, performed on 8/8/19.     HPI:         08/12/19  1419   Weight: 135 kg (297 lb 9.6 oz)       [unfilled]    She is having a lot of pain and some shortness of breath    Today's weight is 135 kg (297 lb 9.6 oz) pounds, today's BMI is Body mass index is 44.59 kg/m²., she has a  loss of 22.4 pounds since the last visit and her weight loss since surgery is 22.4 pounds. The patient reports a decreased portion size and loss of appetite.  Gwen Barnes denies nausea, vomiting, dysphagia, or heartburn. The patient c/o post-op pain that is improving. she is doing well with protein and water intake so far. Taking their vitamins, walking and using IS. Denies fevers, chills, chest pain or shortness of air.      Diet and Exercise: Diet history reviewed and discussed with the patient. Weight loss/gains to date discussed with the patient. No carbonated beverage consumption and exercising regularly- walking frequently.   Supplements: multivitamins, B-12, calcium, iron, B-1 and Vitamin D.     Review of Systems    Patient Active Problem List   Diagnosis   • Obesity, Class III, BMI 40-49.9 (morbid obesity) (CMS/[facility])       The following portions of the patient's history were reviewed and updated as appropriate: allergies, current medications, past family history, past medical history, past social history, past surgical history and problem list.    Vitals:    08/12/19 1419   Pulse: 84   Temp: 97.9 °F (36.6 °C)   SpO2: 97%       Physical Exam    Assessment:   Post-op, the patient is doing  well.     No diagnosis found.    Plan:   Reviewed with patient the importance of following the manual for diet progression. Increase activity as tolerated. Continue increasing daily intake of protein and water.   Return to work: the patient is to return to 3 weeks from their surgery date with no restrictions unless they develop medical problems in which we will see them back in the office. They received a note in our office today with their return to work date.  Activity restrictions: no lifting, pushing or pulling over 25lbs for 3 weeks.   Recommended patient be sure to get at least 70 grams of protein per day. Discussed with the patient the recommended amount of water per day to intake. Reviewed vitamin requirements. Be sure to do routine exercise and increase activity as tolerated. No asa, nsaids or steroids for 8 weeks. Patient may use miralax as needed if necessary.     Instructions / Recommendations: dietary counseling recommended, recommended a daily protein intake of  grams, vitamin supplement(s) recommended, recommended exercising at least 150 minutes per week, behavior modifications recommended and instructed to call the office for concerns, questions, or problems.     The patient was instructed to follow up at one month follow up appt.     The patient was counseled regarding post op bariatric manual      Patient is having pain, will get labs to make sure she is ok

## 2019-08-14 LAB
Lab: NORMAL
METHYLMALONATE SERPL-SCNC: 96 NMOL/L (ref 0–378)
VIT B1 BLD-SCNC: 159.7 NMOL/L (ref 66.5–200)

## 2019-08-15 ENCOUNTER — TELEPHONE (OUTPATIENT)
Dept: BARIATRICS/WEIGHT MGMT | Facility: CLINIC | Age: 29
End: 2019-08-15

## 2019-08-15 ENCOUNTER — OFFICE VISIT (OUTPATIENT)
Dept: BARIATRICS/WEIGHT MGMT | Facility: CLINIC | Age: 29
End: 2019-08-15

## 2019-08-15 VITALS
DIASTOLIC BLOOD PRESSURE: 83 MMHG | TEMPERATURE: 97.7 F | HEIGHT: 69 IN | BODY MASS INDEX: 43.16 KG/M2 | WEIGHT: 291.4 LBS | SYSTOLIC BLOOD PRESSURE: 120 MMHG

## 2019-08-15 DIAGNOSIS — Z51.89 VISIT FOR WOUND CHECK: Primary | ICD-10-CM

## 2019-08-15 PROCEDURE — 99024 POSTOP FOLLOW-UP VISIT: CPT | Performed by: SURGERY

## 2019-08-15 NOTE — TELEPHONE ENCOUNTER
"Pt c/o \"severe\" abdominal pain and pressure approx. 2\" to the left of her belly button and above where her incisions are from her recent gastric sleeve surgery; denies, fever, warmth or redness at site; pain worsens with movement, cannot sit only lay down and states the pain medication does not help; denies dizziness, lightheadedness; denies n/v; states she is drinking fluids and does not feel dehydrated  "

## 2019-09-09 ENCOUNTER — OFFICE VISIT (OUTPATIENT)
Dept: BARIATRICS/WEIGHT MGMT | Facility: CLINIC | Age: 29
End: 2019-09-09

## 2019-09-09 VITALS
TEMPERATURE: 98.5 F | DIASTOLIC BLOOD PRESSURE: 54 MMHG | HEIGHT: 68 IN | BODY MASS INDEX: 42.19 KG/M2 | HEART RATE: 65 BPM | OXYGEN SATURATION: 93 % | SYSTOLIC BLOOD PRESSURE: 115 MMHG | WEIGHT: 278.4 LBS

## 2019-09-09 DIAGNOSIS — E66.01 OBESITY, CLASS III, BMI 40-49.9 (MORBID OBESITY) (HCC): Primary | ICD-10-CM

## 2019-09-09 PROCEDURE — 99024 POSTOP FOLLOW-UP VISIT: CPT | Performed by: SURGERY

## 2019-09-09 RX ORDER — OMEPRAZOLE 40 MG/1
40 CAPSULE, DELAYED RELEASE ORAL DAILY
Qty: 30 CAPSULE | Refills: 6 | Status: SHIPPED | OUTPATIENT
Start: 2019-09-09 | End: 2021-05-15

## 2019-09-09 NOTE — PROGRESS NOTES
[facility] GROUP WEIGHT MANAGEMENT  [address] [place] [address]  [address] [place] [address]  Dept: [phone]  9/9/2019      Gwen Barnes.  [id number]  [id number]  1990  female      Chief Complaint   Patient presents with   • Post-op     1mo GS 8/8/19;320#       BH Post-Op Bariatric Surgery:   Gwen Barnes is status post Laparoscopic Sleeve procedure, performed on 8/8     HPI:       Today's weight is 126 kg (278 lb 6.4 oz) pounds, today's BMI is Body mass index is 42.33 kg/m²., she has a  loss of 20 pounds since the last visit and her weight loss since surgery is 40 pounds. The patient reports a decreased portion size and loss of appetite.      Gwen Barnes is having GERD     Diet and Exercise: Diet history reviewed and discussed with the patient. Weight loss/gains to date discussed with the patient. The patient states they are eating 40-60 grams of protein per day. She reports eating 3 meals per day, a typical portion size of 1 cup, eating 1 snacks per day, drinking 5 or more 8-oz. glasses of water per day, no carbonated beverage consumption and exercising regularly.          Supplements: protein shakes.     Review of Systems   Constitutional: Negative.    HENT: Negative.    Eyes: Negative.    Respiratory: Negative.    Cardiovascular: Negative.    Gastrointestinal: Negative.    Endocrine: Negative.    Genitourinary: Negative.    Musculoskeletal: Negative.    Skin: Negative.    Allergic/Immunologic: Negative.    Neurological: Negative.    Hematological: Negative.    Psychiatric/Behavioral: Negative.        Patient Active Problem List   Diagnosis   • Obesity, Class III, BMI 40-49.9 (morbid obesity) (CMS/HCC)       Past Medical History:   Diagnosis Date   • Anxiety    • Fatigue    • Heartburn    • Morbid obesity (CMS/HCC)    • PCOS (polycystic ovarian syndrome)        The following portions of the patient's history were reviewed and  updated as appropriate: allergies, current medications, past family history, past medical history, past social history, past surgical history and problem list.    Vitals:    09/09/19 1424   BP: 115/54   Pulse: 65   Temp: 98.5 °F (36.9 °C)   SpO2: 93%       Physical Exam   Constitutional: She is oriented to person, place, and time. She appears well-developed and well-nourished.   HENT:   Head: Normocephalic and atraumatic.   Eyes: Conjunctivae and EOM are normal. Pupils are equal, round, and reactive to light.   Neck: Normal range of motion. Neck supple.   Cardiovascular: Normal rate, regular rhythm, normal heart sounds and intact distal pulses.   Pulmonary/Chest: Effort normal and breath sounds normal.   Abdominal: Soft. Bowel sounds are normal. She exhibits no distension and no mass. There is no tenderness. There is no rebound and no guarding. No hernia.   Musculoskeletal: Normal range of motion. She exhibits no edema.   Neurological: She is alert and oriented to person, place, and time.   Skin: Skin is warm and dry.   Psychiatric: She has a normal mood and affect.   Vitals reviewed.         Assessment:   Post-op, the patient is going great.     Plan:     Encouraged patient to be sure to get plenty of lean protein per day through small frequent meals all with a protein source.   Activity restrictions: none.   Recommended patient be sure to get at least 70 grams of protein per day by eating small, frequent meals all with high lean protein choices. Be sure to limit/cut back on daily carbohydrate intake. Discussed with the patient the recommended amount of water per day to intake- half of body weight in ounces. Reviewed vitamin requirements. Be sure to do routine exercise, 150 minutes per week minimum, including both cardio and strength training.     Instructions / Recommendations: dietary counseling recommended, recommended a daily protein intake of  grams, vitamin supplement(s) recommended, recommended  exercising at least 150 minutes per week, behavior modifications recommended and instructed to call the office for concerns, questions, or problems.     The patient was instructed to follow up in 2 months .     The patient was counseled regarding. Total time spent face to face was 15 minutes and 15 minutes was spent counseling.

## 2019-10-09 ENCOUNTER — RESULTS ENCOUNTER (OUTPATIENT)
Dept: BARIATRICS/WEIGHT MGMT | Facility: CLINIC | Age: 29
End: 2019-10-09

## 2019-10-09 DIAGNOSIS — E66.01 OBESITY, CLASS III, BMI 40-49.9 (MORBID OBESITY) (HCC): ICD-10-CM

## 2019-10-30 ENCOUNTER — APPOINTMENT (OUTPATIENT)
Dept: CT IMAGING | Facility: HOSPITAL | Age: 29
End: 2019-10-30

## 2019-10-30 ENCOUNTER — HOSPITAL ENCOUNTER (EMERGENCY)
Facility: HOSPITAL | Age: 29
Discharge: HOME | End: 2019-10-30
Admitting: EMERGENCY MEDICINE

## 2019-10-30 VITALS
TEMPERATURE: 98.5 F | DIASTOLIC BLOOD PRESSURE: 72 MMHG | HEIGHT: 69 IN | RESPIRATION RATE: 18 BRPM | HEART RATE: 59 BPM | SYSTOLIC BLOOD PRESSURE: 111 MMHG | WEIGHT: 264.33 LBS | BODY MASS INDEX: 39.15 KG/M2 | OXYGEN SATURATION: 100 %

## 2019-10-30 DIAGNOSIS — R10.13 EPIGASTRIC PAIN: Primary | ICD-10-CM

## 2019-10-30 DIAGNOSIS — A49.9 UTI (URINARY TRACT INFECTION), BACTERIAL: ICD-10-CM

## 2019-10-30 DIAGNOSIS — N39.0 UTI (URINARY TRACT INFECTION), BACTERIAL: ICD-10-CM

## 2019-10-30 LAB
ALBUMIN SERPL-MCNC: 4.3 G/DL (ref 3.5–5.2)
ALBUMIN/GLOB SERPL: 1.5 G/DL
ALP SERPL-CCNC: 88 U/L (ref 39–117)
ALT SERPL W P-5'-P-CCNC: 29 U/L (ref 1–33)
ANION GAP SERPL CALCULATED.3IONS-SCNC: 10 MMOL/L (ref 5–15)
AST SERPL-CCNC: 33 U/L (ref 1–32)
B-HCG UR QL: NEGATIVE
BACTERIA UR QL AUTO: ABNORMAL /HPF
BASOPHILS # BLD AUTO: 0.1 10*3/MM3 (ref 0–0.2)
BASOPHILS NFR BLD AUTO: 0.8 % (ref 0–1.5)
BILIRUB SERPL-MCNC: 0.5 MG/DL (ref 0.2–1.2)
BILIRUB UR QL STRIP: NEGATIVE
BUN BLD-MCNC: 16 MG/DL (ref 6–20)
BUN/CREAT SERPL: 19.8 (ref 7–25)
CALCIUM SPEC-SCNC: 9.5 MG/DL (ref 8.6–10.5)
CHLORIDE SERPL-SCNC: 101 MMOL/L (ref 98–107)
CLARITY UR: ABNORMAL
CO2 SERPL-SCNC: 28 MMOL/L (ref 22–29)
COLOR UR: YELLOW
CREAT BLD-MCNC: 0.81 MG/DL (ref 0.57–1)
DEPRECATED RDW RBC AUTO: 44.6 FL (ref 37–54)
EOSINOPHIL # BLD AUTO: 0 10*3/MM3 (ref 0–0.4)
EOSINOPHIL NFR BLD AUTO: 0.4 % (ref 0.3–6.2)
ERYTHROCYTE [DISTWIDTH] IN BLOOD BY AUTOMATED COUNT: 14.5 % (ref 12.3–15.4)
GFR SERPL CREATININE-BSD FRML MDRD: 85 ML/MIN/1.73
GLOBULIN UR ELPH-MCNC: 2.9 GM/DL
GLUCOSE BLD-MCNC: 88 MG/DL (ref 65–99)
GLUCOSE UR STRIP-MCNC: NEGATIVE MG/DL
HCT VFR BLD AUTO: 37.9 % (ref 34–46.6)
HGB BLD-MCNC: 13.2 G/DL (ref 12–15.9)
HGB UR QL STRIP.AUTO: NEGATIVE
HYALINE CASTS UR QL AUTO: ABNORMAL /LPF
KETONES UR QL STRIP: NEGATIVE
LEUKOCYTE ESTERASE UR QL STRIP.AUTO: ABNORMAL
LIPASE SERPL-CCNC: 60 U/L (ref 13–60)
LYMPHOCYTES # BLD AUTO: 3.2 10*3/MM3 (ref 0.7–3.1)
LYMPHOCYTES NFR BLD AUTO: 34.4 % (ref 19.6–45.3)
MCH RBC QN AUTO: 30.4 PG (ref 26.6–33)
MCHC RBC AUTO-ENTMCNC: 34.9 G/DL (ref 31.5–35.7)
MCV RBC AUTO: 87.2 FL (ref 79–97)
MONOCYTES # BLD AUTO: 0.6 10*3/MM3 (ref 0.1–0.9)
MONOCYTES NFR BLD AUTO: 6.1 % (ref 5–12)
NEUTROPHILS # BLD AUTO: 5.4 10*3/MM3 (ref 1.7–7)
NEUTROPHILS NFR BLD AUTO: 58.3 % (ref 42.7–76)
NITRITE UR QL STRIP: NEGATIVE
NRBC BLD AUTO-RTO: 0.1 /100 WBC (ref 0–0.2)
PH UR STRIP.AUTO: 6.5 [PH] (ref 5–8)
PLATELET # BLD AUTO: 338 10*3/MM3 (ref 140–450)
PMV BLD AUTO: 8.3 FL (ref 6–12)
POTASSIUM BLD-SCNC: 3.7 MMOL/L (ref 3.5–5.2)
PROT SERPL-MCNC: 7.2 G/DL (ref 6–8.5)
PROT UR QL STRIP: NEGATIVE
RBC # BLD AUTO: 4.34 10*6/MM3 (ref 3.77–5.28)
RBC # UR: ABNORMAL /HPF
REF LAB TEST METHOD: ABNORMAL
SODIUM BLD-SCNC: 139 MMOL/L (ref 136–145)
SP GR UR STRIP: 1.03 (ref 1–1.03)
SQUAMOUS #/AREA URNS HPF: ABNORMAL /HPF
UROBILINOGEN UR QL STRIP: ABNORMAL
WBC NRBC COR # BLD: 9.2 10*3/MM3 (ref 3.4–10.8)
WBC UR QL AUTO: ABNORMAL /HPF

## 2019-10-30 PROCEDURE — 87186 SC STD MICRODIL/AGAR DIL: CPT | Performed by: NURSE PRACTITIONER

## 2019-10-30 PROCEDURE — 96375 TX/PRO/DX INJ NEW DRUG ADDON: CPT

## 2019-10-30 PROCEDURE — 25010000002 KETOROLAC TROMETHAMINE PER 15 MG: Performed by: NURSE PRACTITIONER

## 2019-10-30 PROCEDURE — 87086 URINE CULTURE/COLONY COUNT: CPT | Performed by: NURSE PRACTITIONER

## 2019-10-30 PROCEDURE — 74176 CT ABD & PELVIS W/O CONTRAST: CPT

## 2019-10-30 PROCEDURE — 87088 URINE BACTERIA CULTURE: CPT | Performed by: NURSE PRACTITIONER

## 2019-10-30 PROCEDURE — 80053 COMPREHEN METABOLIC PANEL: CPT | Performed by: NURSE PRACTITIONER

## 2019-10-30 PROCEDURE — 25010000002 ONDANSETRON PER 1 MG: Performed by: NURSE PRACTITIONER

## 2019-10-30 PROCEDURE — 96374 THER/PROPH/DIAG INJ IV PUSH: CPT

## 2019-10-30 PROCEDURE — 83690 ASSAY OF LIPASE: CPT | Performed by: NURSE PRACTITIONER

## 2019-10-30 PROCEDURE — 85025 COMPLETE CBC W/AUTO DIFF WBC: CPT | Performed by: NURSE PRACTITIONER

## 2019-10-30 PROCEDURE — 81025 URINE PREGNANCY TEST: CPT | Performed by: NURSE PRACTITIONER

## 2019-10-30 PROCEDURE — 99283 EMERGENCY DEPT VISIT LOW MDM: CPT

## 2019-10-30 PROCEDURE — 81001 URINALYSIS AUTO W/SCOPE: CPT | Performed by: NURSE PRACTITIONER

## 2019-10-30 RX ORDER — ONDANSETRON 2 MG/ML
4 INJECTION INTRAMUSCULAR; INTRAVENOUS ONCE
Status: COMPLETED | OUTPATIENT
Start: 2019-10-30 | End: 2019-10-30

## 2019-10-30 RX ORDER — KETOROLAC TROMETHAMINE 30 MG/ML
30 INJECTION, SOLUTION INTRAMUSCULAR; INTRAVENOUS ONCE
Status: COMPLETED | OUTPATIENT
Start: 2019-10-30 | End: 2019-10-30

## 2019-10-30 RX ORDER — CEFDINIR 300 MG/1
300 CAPSULE ORAL 2 TIMES DAILY
Qty: 10 CAPSULE | Refills: 0 | Status: SHIPPED | OUTPATIENT
Start: 2019-10-30 | End: 2021-05-15

## 2019-10-30 RX ORDER — SODIUM CHLORIDE 0.9 % (FLUSH) 0.9 %
10 SYRINGE (ML) INJECTION AS NEEDED
Status: DISCONTINUED | OUTPATIENT
Start: 2019-10-30 | End: 2019-10-30 | Stop reason: HOSPADM

## 2019-10-30 RX ORDER — HYDROCODONE BITARTRATE AND ACETAMINOPHEN 5; 325 MG/1; MG/1
1 TABLET ORAL ONCE AS NEEDED
Status: DISCONTINUED | OUTPATIENT
Start: 2019-10-30 | End: 2019-10-30 | Stop reason: HOSPADM

## 2019-10-30 RX ADMIN — KETOROLAC TROMETHAMINE 30 MG: 30 INJECTION, SOLUTION INTRAMUSCULAR at 18:59

## 2019-10-30 RX ADMIN — ONDANSETRON 4 MG: 2 INJECTION INTRAMUSCULAR; INTRAVENOUS at 18:57

## 2019-10-31 ENCOUNTER — HOSPITAL ENCOUNTER (OUTPATIENT)
Dept: NUCLEAR MEDICINE | Facility: HOSPITAL | Age: 29
Discharge: HOME | End: 2019-10-31

## 2019-10-31 ENCOUNTER — TRANSCRIBE ORDERS (OUTPATIENT)
Dept: ADMINISTRATIVE | Facility: HOSPITAL | Age: 29
End: 2019-10-31

## 2019-10-31 DIAGNOSIS — R10.11 RUQ PAIN: Primary | ICD-10-CM

## 2019-10-31 DIAGNOSIS — R10.11 RUQ PAIN: ICD-10-CM

## 2019-10-31 PROCEDURE — 0 TECHNETIUM TC 99M MEBROFENIN KIT: Performed by: NURSE PRACTITIONER

## 2019-10-31 PROCEDURE — 78227 HEPATOBIL SYST IMAGE W/DRUG: CPT

## 2019-10-31 PROCEDURE — A9537 TC99M MEBROFENIN: HCPCS | Performed by: NURSE PRACTITIONER

## 2019-10-31 PROCEDURE — 78226 HEPATOBILIARY SYSTEM IMAGING: CPT

## 2019-10-31 RX ORDER — KIT FOR THE PREPARATION OF TECHNETIUM TC 99M MEBROFENIN 45 MG/10ML
1 INJECTION, POWDER, LYOPHILIZED, FOR SOLUTION INTRAVENOUS
Status: COMPLETED | OUTPATIENT
Start: 2019-10-31 | End: 2019-10-31

## 2019-10-31 RX ADMIN — MEBROFENIN 1 DOSE: 45 INJECTION, POWDER, LYOPHILIZED, FOR SOLUTION INTRAVENOUS at 11:30

## 2019-11-01 ENCOUNTER — OFFICE VISIT (OUTPATIENT)
Dept: BARIATRICS/WEIGHT MGMT | Facility: CLINIC | Age: 29
End: 2019-11-01

## 2019-11-01 VITALS
DIASTOLIC BLOOD PRESSURE: 74 MMHG | SYSTOLIC BLOOD PRESSURE: 117 MMHG | BODY MASS INDEX: 38.57 KG/M2 | TEMPERATURE: 98.6 F | OXYGEN SATURATION: 94 % | WEIGHT: 260.4 LBS | HEART RATE: 56 BPM | HEIGHT: 69 IN

## 2019-11-01 DIAGNOSIS — E66.01 OBESITY, CLASS III, BMI 40-49.9 (MORBID OBESITY) (HCC): Primary | ICD-10-CM

## 2019-11-01 PROBLEM — Z01.818 ENCOUNTER FOR OTHER PREPROCEDURAL EXAMINATION: Status: ACTIVE | Noted: 2019-01-29

## 2019-11-01 PROBLEM — F41.9 ANXIETY: Status: ACTIVE | Noted: 2019-01-22

## 2019-11-01 PROBLEM — R53.83 MALAISE AND FATIGUE: Status: ACTIVE | Noted: 2019-01-22

## 2019-11-01 PROBLEM — R12 HEARTBURN: Status: ACTIVE | Noted: 2019-01-22

## 2019-11-01 PROBLEM — R53.81 MALAISE AND FATIGUE: Status: ACTIVE | Noted: 2019-01-22

## 2019-11-01 PROBLEM — E28.2 POLYCYSTIC OVARIES: Status: ACTIVE | Noted: 2019-01-22

## 2019-11-01 LAB — BACTERIA SPEC AEROBE CULT: ABNORMAL

## 2019-11-01 PROCEDURE — 99213 OFFICE O/P EST LOW 20 MIN: CPT | Performed by: SURGERY

## 2019-11-01 RX ORDER — HYDROCODONE BITARTRATE AND ACETAMINOPHEN 5; 325 MG/1; MG/1
1 TABLET ORAL EVERY 6 HOURS PRN
Qty: 30 TABLET | Refills: 0 | Status: SHIPPED | OUTPATIENT
Start: 2019-11-01 | End: 2019-11-11

## 2019-11-01 RX ORDER — DROSPIRENONE AND ETHINYL ESTRADIOL 0.03MG-3MG
1 KIT ORAL NIGHTLY
Refills: 12 | COMMUNITY
Start: 2019-10-28 | End: 2022-03-24 | Stop reason: HOSPADM

## 2019-11-01 NOTE — PROGRESS NOTES
HISTORY AND PHYSICAL      Patient Care Team:  Gadiel Barrett MD as PCP - General    Chief complaint right upper quadrant pain    Subjective     Patient is a 29 y.o. female presents with right upper quadrant pain for the past week to return to emergency room initially her CT scan was normal and outpatient HIDA scan demonstrates no dilatation of the gallbladder consistent with cholecystitis.  She says she has severe right upper quadrant pain is the worst pain she never spares her life.  She has not had a bad episode since she went to the emergency room she does have some ongoing back pain this area.  She is about 3 months status post gastric sleeve and has had weight loss of about 60 pounds.        Review of Systems   Pertinent items are noted in HPI, all other systems reviewed and negative    History  Past Medical History:   Diagnosis Date   • Anxiety    • Fatigue    • Heartburn    • Morbid obesity (CMS/HCC)    • PCOS (polycystic ovarian syndrome)      Past Surgical History:   Procedure Laterality Date   • DILATATION AND CURETTAGE  2014 & 2019   • GASTRIC SLEEVE LAPAROSCOPIC N/A 8/6/2019    Procedure: laparoscopic sleeve gastrectomy;  Surgeon: Luna Keyes MD;  Location: [facility] OR;  Service: Bariatric   • TONSILLECTOMY AND ADENOIDECTOMY  2002     Family History   Problem Relation Age of Onset   • Obesity Sister    • Diabetes type II Maternal Grandmother    • Alzheimer's disease Maternal Grandmother    • Lung cancer Maternal Grandfather    • Lung cancer Paternal Grandmother      Social History     Tobacco Use   • Smoking status: Never Smoker   • Smokeless tobacco: Never Used   Substance Use Topics   • Alcohol use: Yes     Frequency: Never     Comment: social   • Drug use: No       (Not in a hospital admission)  Allergies:  Patient has no known allergies.    Objective     Vital Signs  Temp:  [98.6 °F (37 °C)] 98.6 °F (37 °C)  Heart Rate:  [56] 56  BP: (117)/(74) 117/74    Physical Exam:      General  Appearance:    Alert, cooperative, in no acute distress   Head:    Normocephalic, without obvious abnormality, atraumatic   Eyes:            Lids and lashes normal, conjunctivae and sclerae normal, no   icterus, no pallor, corneas clear, PERRLA   Ears:    Ears appear intact with no abnormalities noted   Throat:   No oral lesions, no thrush, oral mucosa moist   Neck:   No adenopathy, supple, trachea midline, no thyromegaly, no   carotid bruit, no JVD   Back:     No kyphosis present, no scoliosis present, no skin lesions,      erythema or scars, no tenderness to percussion or                   palpation,   range of motion normal   Lungs:     Clear to auscultation,respirations regular, even and                  unlabored    Heart:    Regular rhythm and normal rate, normal S1 and S2, no            murmur, no gallop, no rub, no click   Chest Wall:    No abnormalities observed   Abdomen:     Normal bowel sounds, no masses, no organomegaly, soft        non-tender, non-distended, no guarding, no rebound                tenderness   Rectal:     Deferred   Extremities:   Moves all extremities well, no edema, no cyanosis, no             redness   Pulses:   Pulses palpable and equal bilaterally   Skin:   No bleeding, bruising or rash   Lymph nodes:   No palpable adenopathy   Neurologic:   Cranial nerves 2 - 12 grossly intact, sensation intact, DTR       present and equal bilaterally     Lab Results (last 24 hours)     ** No results found for the last 24 hours. **          Imaging Results (Last 24 Hours)     ** No results found for the last 24 hours. **          Results Review:    I reviewed the patient's new clinical results.  I reviewed the patient's new imaging results and agree with the interpretation.    Assessment/Plan       * No active hospital problems. *      I have explained the risks and benefits  of laparoscopic cholecystectomy including bleeding infection and common bile duct injury and the patient understands this  and agrees to proceed          Luna Keyes MD  11/01/19  12:55 PM

## 2019-11-02 ENCOUNTER — APPOINTMENT (OUTPATIENT)
Dept: LAB | Facility: HOSPITAL | Age: 29
End: 2019-11-02

## 2019-11-02 LAB
ALBUMIN SERPL-MCNC: 3.8 G/DL (ref 3.5–5.2)
ALBUMIN/GLOB SERPL: 1.2 G/DL
ALP SERPL-CCNC: 98 U/L (ref 39–117)
ALT SERPL W P-5'-P-CCNC: 36 U/L (ref 1–33)
ANION GAP SERPL CALCULATED.3IONS-SCNC: 10.6 MMOL/L (ref 5–15)
AST SERPL-CCNC: 42 U/L (ref 1–32)
BASOPHILS # BLD AUTO: 0 10*3/MM3 (ref 0–0.2)
BASOPHILS NFR BLD AUTO: 0.6 % (ref 0–1.5)
BILIRUB SERPL-MCNC: 0.5 MG/DL (ref 0.2–1.2)
BUN BLD-MCNC: 14 MG/DL (ref 6–20)
BUN/CREAT SERPL: 15.7 (ref 7–25)
CALCIUM SPEC-SCNC: 8.7 MG/DL (ref 8.6–10.5)
CHLORIDE SERPL-SCNC: 103 MMOL/L (ref 98–107)
CO2 SERPL-SCNC: 26.4 MMOL/L (ref 22–29)
CREAT BLD-MCNC: 0.89 MG/DL (ref 0.57–1)
DEPRECATED RDW RBC AUTO: 45.1 FL (ref 37–54)
EOSINOPHIL # BLD AUTO: 0.1 10*3/MM3 (ref 0–0.4)
EOSINOPHIL NFR BLD AUTO: 0.7 % (ref 0.3–6.2)
ERYTHROCYTE [DISTWIDTH] IN BLOOD BY AUTOMATED COUNT: 14.3 % (ref 12.3–15.4)
GFR SERPL CREATININE-BSD FRML MDRD: 76 ML/MIN/1.73
GLOBULIN UR ELPH-MCNC: 3.3 GM/DL
GLUCOSE BLD-MCNC: 103 MG/DL (ref 65–99)
HCT VFR BLD AUTO: 38.8 % (ref 34–46.6)
HGB BLD-MCNC: 12.8 G/DL (ref 12–15.9)
LYMPHOCYTES # BLD AUTO: 2.8 10*3/MM3 (ref 0.7–3.1)
LYMPHOCYTES NFR BLD AUTO: 38.2 % (ref 19.6–45.3)
MCH RBC QN AUTO: 29.2 PG (ref 26.6–33)
MCHC RBC AUTO-ENTMCNC: 33 G/DL (ref 31.5–35.7)
MCV RBC AUTO: 88.4 FL (ref 79–97)
MONOCYTES # BLD AUTO: 0.3 10*3/MM3 (ref 0.1–0.9)
MONOCYTES NFR BLD AUTO: 4.5 % (ref 5–12)
NEUTROPHILS # BLD AUTO: 4.2 10*3/MM3 (ref 1.7–7)
NEUTROPHILS NFR BLD AUTO: 56 % (ref 42.7–76)
NRBC BLD AUTO-RTO: 0.1 /100 WBC (ref 0–0.2)
PLATELET # BLD AUTO: 342 10*3/MM3 (ref 140–450)
PMV BLD AUTO: 8.4 FL (ref 6–12)
POTASSIUM BLD-SCNC: 4.3 MMOL/L (ref 3.5–5.2)
PROT SERPL-MCNC: 7.1 G/DL (ref 6–8.5)
RBC # BLD AUTO: 4.39 10*6/MM3 (ref 3.77–5.28)
SODIUM BLD-SCNC: 140 MMOL/L (ref 136–145)
WBC NRBC COR # BLD: 7.5 10*3/MM3 (ref 3.4–10.8)

## 2019-11-02 PROCEDURE — 80053 COMPREHEN METABOLIC PANEL: CPT | Performed by: SURGERY

## 2019-11-02 PROCEDURE — 85025 COMPLETE CBC W/AUTO DIFF WBC: CPT | Performed by: SURGERY

## 2019-11-02 PROCEDURE — 36415 COLL VENOUS BLD VENIPUNCTURE: CPT | Performed by: SURGERY

## 2019-11-04 ENCOUNTER — ANESTHESIA EVENT (OUTPATIENT)
Dept: PERIOP | Facility: HOSPITAL | Age: 29
End: 2019-11-04

## 2019-11-05 ENCOUNTER — HOSPITAL ENCOUNTER (OUTPATIENT)
Facility: HOSPITAL | Age: 29
Setting detail: HOSPITAL OUTPATIENT SURGERY
Discharge: HOME | End: 2019-11-05
Attending: SURGERY | Admitting: SURGERY

## 2019-11-05 ENCOUNTER — ANESTHESIA (OUTPATIENT)
Dept: PERIOP | Facility: HOSPITAL | Age: 29
End: 2019-11-05

## 2019-11-05 VITALS
DIASTOLIC BLOOD PRESSURE: 61 MMHG | RESPIRATION RATE: 17 BRPM | HEIGHT: 69 IN | WEIGHT: 258.6 LBS | BODY MASS INDEX: 38.3 KG/M2 | OXYGEN SATURATION: 96 % | HEART RATE: 65 BPM | SYSTOLIC BLOOD PRESSURE: 100 MMHG | TEMPERATURE: 97.6 F

## 2019-11-05 DIAGNOSIS — E66.01 OBESITY, CLASS III, BMI 40-49.9 (MORBID OBESITY) (HCC): ICD-10-CM

## 2019-11-05 LAB — B-HCG UR QL: NEGATIVE

## 2019-11-05 PROCEDURE — 25010000002 DEXAMETHASONE PER 1 MG: Performed by: ANESTHESIOLOGIST ASSISTANT

## 2019-11-05 PROCEDURE — 88304 TISSUE EXAM BY PATHOLOGIST: CPT | Performed by: SURGERY

## 2019-11-05 PROCEDURE — 25010000002 MIDAZOLAM PER 1 MG: Performed by: ANESTHESIOLOGIST ASSISTANT

## 2019-11-05 PROCEDURE — 25010000002 PROPOFOL 200 MG/20ML EMULSION: Performed by: ANESTHESIOLOGIST ASSISTANT

## 2019-11-05 PROCEDURE — 81025 URINE PREGNANCY TEST: CPT | Performed by: SURGERY

## 2019-11-05 PROCEDURE — 25010000002 FENTANYL CITRATE (PF) 100 MCG/2ML SOLUTION: Performed by: ANESTHESIOLOGIST ASSISTANT

## 2019-11-05 PROCEDURE — 25010000002 ONDANSETRON PER 1 MG: Performed by: ANESTHESIOLOGIST ASSISTANT

## 2019-11-05 PROCEDURE — 25010000002 HYDROMORPHONE PER 4 MG: Performed by: ANESTHESIOLOGIST ASSISTANT

## 2019-11-05 PROCEDURE — 47562 LAPAROSCOPIC CHOLECYSTECTOMY: CPT | Performed by: REGISTERED NURSE

## 2019-11-05 PROCEDURE — 47562 LAPAROSCOPIC CHOLECYSTECTOMY: CPT | Performed by: SURGERY

## 2019-11-05 RX ORDER — ONDANSETRON 2 MG/ML
INJECTION INTRAMUSCULAR; INTRAVENOUS AS NEEDED
Status: DISCONTINUED | OUTPATIENT
Start: 2019-11-05 | End: 2019-11-05 | Stop reason: SURG

## 2019-11-05 RX ORDER — HYDROMORPHONE HCL 110MG/55ML
1 PATIENT CONTROLLED ANALGESIA SYRINGE INTRAVENOUS
Status: DISCONTINUED | OUTPATIENT
Start: 2019-11-05 | End: 2019-11-05 | Stop reason: HOSPADM

## 2019-11-05 RX ORDER — FENTANYL CITRATE 50 UG/ML
INJECTION, SOLUTION INTRAMUSCULAR; INTRAVENOUS AS NEEDED
Status: DISCONTINUED | OUTPATIENT
Start: 2019-11-05 | End: 2019-11-05 | Stop reason: SURG

## 2019-11-05 RX ORDER — NALOXONE HCL 0.4 MG/ML
0.4 VIAL (ML) INJECTION AS NEEDED
Status: DISCONTINUED | OUTPATIENT
Start: 2019-11-05 | End: 2019-11-05 | Stop reason: HOSPADM

## 2019-11-05 RX ORDER — MEPERIDINE HYDROCHLORIDE 25 MG/ML
12.5 INJECTION INTRAMUSCULAR; INTRAVENOUS; SUBCUTANEOUS
Status: DISCONTINUED | OUTPATIENT
Start: 2019-11-05 | End: 2019-11-05 | Stop reason: HOSPADM

## 2019-11-05 RX ORDER — HYDROMORPHONE HCL 110MG/55ML
0.25 PATIENT CONTROLLED ANALGESIA SYRINGE INTRAVENOUS
Status: DISCONTINUED | OUTPATIENT
Start: 2019-11-05 | End: 2019-11-05 | Stop reason: HOSPADM

## 2019-11-05 RX ORDER — PROMETHAZINE HYDROCHLORIDE 25 MG/1
25 SUPPOSITORY RECTAL ONCE AS NEEDED
Status: DISCONTINUED | OUTPATIENT
Start: 2019-11-05 | End: 2019-11-05 | Stop reason: HOSPADM

## 2019-11-05 RX ORDER — FENTANYL CITRATE 50 UG/ML
50 INJECTION, SOLUTION INTRAMUSCULAR; INTRAVENOUS
Status: DISCONTINUED | OUTPATIENT
Start: 2019-11-05 | End: 2019-11-05 | Stop reason: HOSPADM

## 2019-11-05 RX ORDER — SCOLOPAMINE TRANSDERMAL SYSTEM 1 MG/1
PATCH, EXTENDED RELEASE TRANSDERMAL AS NEEDED
Status: DISCONTINUED | OUTPATIENT
Start: 2019-11-05 | End: 2019-11-05 | Stop reason: SURG

## 2019-11-05 RX ORDER — HYDROCODONE BITARTRATE AND ACETAMINOPHEN 5; 325 MG/1; MG/1
1 TABLET ORAL EVERY 6 HOURS PRN
Qty: 30 TABLET | Refills: 0 | Status: SHIPPED | OUTPATIENT
Start: 2019-11-05 | End: 2021-05-15

## 2019-11-05 RX ORDER — DEXAMETHASONE SODIUM PHOSPHATE 4 MG/ML
INJECTION, SOLUTION INTRA-ARTICULAR; INTRALESIONAL; INTRAMUSCULAR; INTRAVENOUS; SOFT TISSUE AS NEEDED
Status: DISCONTINUED | OUTPATIENT
Start: 2019-11-05 | End: 2019-11-05 | Stop reason: SURG

## 2019-11-05 RX ORDER — PROMETHAZINE HYDROCHLORIDE 25 MG/1
25 TABLET ORAL ONCE AS NEEDED
Status: DISCONTINUED | OUTPATIENT
Start: 2019-11-05 | End: 2019-11-05 | Stop reason: HOSPADM

## 2019-11-05 RX ORDER — SODIUM CHLORIDE 0.9 % (FLUSH) 0.9 %
3 SYRINGE (ML) INJECTION EVERY 12 HOURS SCHEDULED
Status: DISCONTINUED | OUTPATIENT
Start: 2019-11-05 | End: 2019-11-05 | Stop reason: HOSPADM

## 2019-11-05 RX ORDER — PROPOFOL 10 MG/ML
INJECTION, EMULSION INTRAVENOUS AS NEEDED
Status: DISCONTINUED | OUTPATIENT
Start: 2019-11-05 | End: 2019-11-05 | Stop reason: SURG

## 2019-11-05 RX ORDER — HYDROMORPHONE HCL 110MG/55ML
PATIENT CONTROLLED ANALGESIA SYRINGE INTRAVENOUS AS NEEDED
Status: DISCONTINUED | OUTPATIENT
Start: 2019-11-05 | End: 2019-11-05 | Stop reason: SURG

## 2019-11-05 RX ORDER — ROCURONIUM BROMIDE 10 MG/ML
INJECTION, SOLUTION INTRAVENOUS AS NEEDED
Status: DISCONTINUED | OUTPATIENT
Start: 2019-11-05 | End: 2019-11-05 | Stop reason: SURG

## 2019-11-05 RX ORDER — NEOSTIGMINE METHYLSULFATE 5 MG/5 ML
SYRINGE (ML) INTRAVENOUS AS NEEDED
Status: DISCONTINUED | OUTPATIENT
Start: 2019-11-05 | End: 2019-11-05 | Stop reason: SURG

## 2019-11-05 RX ORDER — GLYCOPYRROLATE 1 MG/5 ML
SYRINGE (ML) INTRAVENOUS AS NEEDED
Status: DISCONTINUED | OUTPATIENT
Start: 2019-11-05 | End: 2019-11-05 | Stop reason: SURG

## 2019-11-05 RX ORDER — BUPIVACAINE HYDROCHLORIDE 2.5 MG/ML
INJECTION, SOLUTION INFILTRATION; PERINEURAL AS NEEDED
Status: DISCONTINUED | OUTPATIENT
Start: 2019-11-05 | End: 2019-11-05 | Stop reason: HOSPADM

## 2019-11-05 RX ORDER — LABETALOL HYDROCHLORIDE 5 MG/ML
5 INJECTION, SOLUTION INTRAVENOUS
Status: DISCONTINUED | OUTPATIENT
Start: 2019-11-05 | End: 2019-11-05 | Stop reason: HOSPADM

## 2019-11-05 RX ORDER — LIDOCAINE HYDROCHLORIDE 10 MG/ML
INJECTION, SOLUTION EPIDURAL; INFILTRATION; INTRACAUDAL; PERINEURAL AS NEEDED
Status: DISCONTINUED | OUTPATIENT
Start: 2019-11-05 | End: 2019-11-05 | Stop reason: SURG

## 2019-11-05 RX ORDER — SODIUM CHLORIDE, SODIUM LACTATE, POTASSIUM CHLORIDE, CALCIUM CHLORIDE 600; 310; 30; 20 MG/100ML; MG/100ML; MG/100ML; MG/100ML
9 INJECTION, SOLUTION INTRAVENOUS CONTINUOUS PRN
Status: DISCONTINUED | OUTPATIENT
Start: 2019-11-05 | End: 2019-11-05 | Stop reason: HOSPADM

## 2019-11-05 RX ORDER — OXYCODONE HYDROCHLORIDE 5 MG/1
7.5 TABLET ORAL ONCE AS NEEDED
Status: COMPLETED | OUTPATIENT
Start: 2019-11-05 | End: 2019-11-05

## 2019-11-05 RX ORDER — ONDANSETRON 2 MG/ML
4 INJECTION INTRAMUSCULAR; INTRAVENOUS ONCE AS NEEDED
Status: DISCONTINUED | OUTPATIENT
Start: 2019-11-05 | End: 2019-11-05 | Stop reason: HOSPADM

## 2019-11-05 RX ORDER — SODIUM CHLORIDE 0.9 % (FLUSH) 0.9 %
3-10 SYRINGE (ML) INJECTION AS NEEDED
Status: DISCONTINUED | OUTPATIENT
Start: 2019-11-05 | End: 2019-11-05 | Stop reason: HOSPADM

## 2019-11-05 RX ORDER — MIDAZOLAM HYDROCHLORIDE 1 MG/ML
INJECTION INTRAMUSCULAR; INTRAVENOUS AS NEEDED
Status: DISCONTINUED | OUTPATIENT
Start: 2019-11-05 | End: 2019-11-05 | Stop reason: SURG

## 2019-11-05 RX ORDER — FLUMAZENIL 0.1 MG/ML
0.2 INJECTION INTRAVENOUS AS NEEDED
Status: DISCONTINUED | OUTPATIENT
Start: 2019-11-05 | End: 2019-11-05 | Stop reason: HOSPADM

## 2019-11-05 RX ORDER — IPRATROPIUM BROMIDE AND ALBUTEROL SULFATE 2.5; .5 MG/3ML; MG/3ML
3 SOLUTION RESPIRATORY (INHALATION) ONCE AS NEEDED
Status: DISCONTINUED | OUTPATIENT
Start: 2019-11-05 | End: 2019-11-05 | Stop reason: HOSPADM

## 2019-11-05 RX ORDER — FENTANYL CITRATE 50 UG/ML
25 INJECTION, SOLUTION INTRAMUSCULAR; INTRAVENOUS
Status: DISCONTINUED | OUTPATIENT
Start: 2019-11-05 | End: 2019-11-05 | Stop reason: HOSPADM

## 2019-11-05 RX ORDER — PROMETHAZINE HYDROCHLORIDE 25 MG/ML
6.25 INJECTION, SOLUTION INTRAMUSCULAR; INTRAVENOUS ONCE AS NEEDED
Status: DISCONTINUED | OUTPATIENT
Start: 2019-11-05 | End: 2019-11-05 | Stop reason: HOSPADM

## 2019-11-05 RX ORDER — HYDROMORPHONE HCL 110MG/55ML
0.5 PATIENT CONTROLLED ANALGESIA SYRINGE INTRAVENOUS
Status: DISCONTINUED | OUTPATIENT
Start: 2019-11-05 | End: 2019-11-05 | Stop reason: HOSPADM

## 2019-11-05 RX ORDER — HYDRALAZINE HYDROCHLORIDE 20 MG/ML
5 INJECTION INTRAMUSCULAR; INTRAVENOUS
Status: DISCONTINUED | OUTPATIENT
Start: 2019-11-05 | End: 2019-11-05 | Stop reason: HOSPADM

## 2019-11-05 RX ADMIN — PROPOFOL 50 MG: 10 INJECTION, EMULSION INTRAVENOUS at 08:25

## 2019-11-05 RX ADMIN — LIDOCAINE HYDROCHLORIDE 50 MG: 10 INJECTION, SOLUTION EPIDURAL; INFILTRATION; INTRACAUDAL; PERINEURAL at 08:01

## 2019-11-05 RX ADMIN — CEFAZOLIN SODIUM 2 G: 1 INJECTION, POWDER, FOR SOLUTION INTRAMUSCULAR; INTRAVENOUS at 07:53

## 2019-11-05 RX ADMIN — PROPOFOL 200 MG: 10 INJECTION, EMULSION INTRAVENOUS at 08:01

## 2019-11-05 RX ADMIN — SCOPALAMINE 1 PATCH: 1 PATCH, EXTENDED RELEASE TRANSDERMAL at 07:56

## 2019-11-05 RX ADMIN — HYDROMORPHONE HYDROCHLORIDE 1 MG: 2 INJECTION INTRAMUSCULAR; INTRAVENOUS; SUBCUTANEOUS at 08:26

## 2019-11-05 RX ADMIN — FENTANYL CITRATE 50 MCG: 50 INJECTION, SOLUTION INTRAMUSCULAR; INTRAVENOUS at 08:13

## 2019-11-05 RX ADMIN — DEXAMETHASONE SODIUM PHOSPHATE 4 MG: 4 INJECTION, SOLUTION INTRAMUSCULAR; INTRAVENOUS at 08:08

## 2019-11-05 RX ADMIN — FENTANYL CITRATE 25 MCG: 50 INJECTION, SOLUTION INTRAMUSCULAR; INTRAVENOUS at 09:27

## 2019-11-05 RX ADMIN — FENTANYL CITRATE 50 MCG: 50 INJECTION, SOLUTION INTRAMUSCULAR; INTRAVENOUS at 07:53

## 2019-11-05 RX ADMIN — ONDANSETRON 4 MG: 2 INJECTION INTRAMUSCULAR; INTRAVENOUS at 08:29

## 2019-11-05 RX ADMIN — Medication 0.8 MG: at 08:28

## 2019-11-05 RX ADMIN — OXYCODONE HYDROCHLORIDE 7.5 MG: 5 TABLET ORAL at 10:30

## 2019-11-05 RX ADMIN — Medication 0.2 MG: at 08:18

## 2019-11-05 RX ADMIN — Medication 5 MG: at 08:28

## 2019-11-05 RX ADMIN — ROCURONIUM BROMIDE 40 MG: 10 INJECTION, SOLUTION INTRAVENOUS at 08:01

## 2019-11-05 RX ADMIN — SODIUM CHLORIDE, SODIUM LACTATE, POTASSIUM CHLORIDE, AND CALCIUM CHLORIDE 9 ML/HR: 600; 310; 30; 20 INJECTION, SOLUTION INTRAVENOUS at 07:19

## 2019-11-05 RX ADMIN — MIDAZOLAM 2 MG: 1 INJECTION INTRAMUSCULAR; INTRAVENOUS at 07:53

## 2019-11-05 RX ADMIN — FENTANYL CITRATE 50 MCG: 50 INJECTION, SOLUTION INTRAMUSCULAR; INTRAVENOUS at 09:15

## 2019-11-05 NOTE — OP NOTE
Laparoscopic Cholecystectomy Procedure Note    Indications: This patient presents with symptomatic gallbladder disease and will undergo laparoscopic cholecystectomy.    Pre-operative Diagnosis: Cholecystitis    Post-operative Diagnosis: Same    Surgeon: Luna Keyes MD     Assistants: Rosetta Paredes    Procedure Details   The patient was seen again in the Holding Room. The risks, benefits, complications, treatment options, and expected outcomes were discussed with the patient. The possibilities of reaction to medication, pulmonary aspiration, perforation of viscus, bleeding, recurrent infection, finding a normal gallbladder, the need for additional procedures, failure to diagnose a condition, the possible need to convert to an open procedure, and creating a complication requiring transfusion or operation were discussed with the patient. The patient and/or family concurred with the proposed plan, giving informed consent. The site of surgery properly noted/marked. The patient was taken to Operating Room, identified as Gwen Barnes and the procedure verified as Laparoscopic Cholecystectomy. A Time Out was held and the above information confirmed.    Prior to the induction of general anesthesia, antibiotic prophylaxis was administered. General endotracheal anesthesia was then administered and tolerated well. After the induction, the abdomen was prepped in the usual sterile fashion. The patient was positioned in the supine position with the left arm comfortably tucked, along with some reverse Trendelenburg.    Local anesthetic agent was injected into the skin near the umbilicus and an incision made. The midline fascia was incised and a 10 mm port was inserted into the peritoneum under direct vision.Pneumoperitoneum was then created with CO2 and tolerated well without any adverse changes in the patient's vital signs. Additional trocars were introduced under direct vision. All skin incisions were infiltrated with a local  anesthetic agent before making the incision and placing the trocars.     The gallbladder was identified, the fundus grasped and retracted cephalad. Adhesions were lysed bluntly and with the electrocautery where indicated, taking care not to injure any adjacent organs or viscus. The infundibulum was grasped and retracted laterally, exposing the peritoneum overlying the triangle of Calot. This was then divided and exposed in a blunt fashion. The cystic duct was clearly identified and bluntly dissected circumferentially. The junctions of the gallbladder, cystic duct and common bile duct were clearly identified prior to the division of any linear structure and photo documented.       The cystic duct was then doubly ligated with surgical clips and/or Endoloop suture on the patient side and singly clipped on the gallbladder side and divided. The cystic artery was identified, dissected free, ligated with clips and divided as well.     The gallbladder was dissected from the liver bed in retrograde fashion with the electrocautery. The gallbladder was removed. The liver bed was irrigated and inspected. Hemostasis was achieved with the electrocautery. Copious irrigation was utilized and was repeatedly aspirated until clear all particulate matter.    The umbilical port site fascia was closed using a laparoscopic suture passer with 0 Vicryl suture; the skin was then closed with 4-0 monocryl and a sterile dressing was applied.    Instrument, sponge, and needle counts were correct at closure and at the conclusion of the case.     Findings:  Cholecystitis without Cholelithiasis    Estimated Blood Loss: Minimal           Drains:                      Specimens: Gallbladder

## 2019-11-05 NOTE — ANESTHESIA POSTPROCEDURE EVALUATION
Patient: Gwen Barnes    Procedure Summary     Date:  11/05/19 Room / Location:  [facility] OR  / [facility] MAIN OR    Anesthesia Start:  0748 Anesthesia Stop:  0848    Procedure:  CHOLECYSTECTOMY LAPAROSCOPIC (N/A Abdomen) Diagnosis:       Obesity, Class III, BMI 40-49.9 (morbid obesity) (CMS/[facility])      (Obesity, Class III, BMI 40-49.9 (morbid obesity) (CMS/[facility]) [E66.01])    Surgeon:  Luna Keyes MD Provider:  Moisés Glover MD    Anesthesia Type:  general ASA Status:  2          Anesthesia Type: general  Last vitals  BP   101/49 (11/05/19 0951)   Temp   97.3 °F (36.3 °C) (11/05/19 0951)   Pulse   69 (11/05/19 0944)   Resp   22 (11/05/19 0951)     SpO2   95 % (11/05/19 0944)     Post Anesthesia Care and Evaluation    Patient location during evaluation: PACU  Patient participation: complete - patient participated  Level of consciousness: awake  Pain scale: See nurse's notes for pain score.  Pain management: adequate  Airway patency: patent  Anesthetic complications: No anesthetic complications  PONV Status: none  Cardiovascular status: acceptable  Respiratory status: acceptable  Hydration status: acceptable    Comments: Patient seen and examined postoperatively; vital signs stable; SpO2 greater than or equal to 90%; cardiopulmonary status stable; nausea/vomiting adequately controlled; pain adequately controlled; no apparent anesthesia complications; patient discharged from anesthesia care when discharge criteria were met

## 2019-11-05 NOTE — ANESTHESIA PROCEDURE NOTES
Airway  Urgency: elective    Date/Time: 11/5/2019 8:03 AM  Airway not difficult    General Information and Staff    Patient location during procedure: OR  Anesthesiologist: Moisés Glover MD  CRNA: Ariela Posada AA    Indications and Patient Condition  Indications for airway management: airway protection    Preoxygenated: yes  Mask difficulty assessment: 1 - vent by mask    Final Airway Details  Final airway type: endotracheal airway      Successful airway: ETT  Cuffed: yes   Successful intubation technique: direct laryngoscopy  Endotracheal tube insertion site: oral  Blade: Everett  Blade size: 3  ETT size (mm): 7.0  Cormack-Lehane Classification: grade I - full view of glottis  Placement verified by: chest auscultation and capnometry   Measured from: teeth  ETT/EBT  to teeth (cm): 22  Number of attempts at approach: 1  Assessment: lips, teeth, and gum same as pre-op and atraumatic intubation

## 2019-11-05 NOTE — ANESTHESIA PREPROCEDURE EVALUATION
Anesthesia Evaluation     Patient summary reviewed   NPO Solid Status: > 8 hours  NPO Liquid Status: > 8 hours           Airway   Mallampati: II  TM distance: >3 FB  Neck ROM: full  No difficulty expected  Dental - normal exam     Pulmonary - normal exam   Cardiovascular - normal exam  Exercise tolerance: good (4-7 METS)        Neuro/Psych  GI/Hepatic/Renal/Endo    (+) obesity,       Musculoskeletal     Abdominal  - normal exam    Bowel sounds: normal.   Substance History      OB/GYN          Other                        Anesthesia Plan    ASA 2     general     intravenous induction   Anesthetic plan, all risks, benefits, and alternatives have been provided, discussed and informed consent has been obtained with: patient.  Use of blood products discussed with consented to blood products.

## 2019-11-05 NOTE — DISCHARGE INSTRUCTIONS
Dr. Luna Keyes  [address] [facility] [address]  [place] [address]    Discharge Instructions for Gall Bladder Surgery      1. Go home, rest and take it easy today; however, you should get up and move about several times today to reduce the risk of developing a clot in your legs.      2. You may experience some dizziness or memory loss from the anesthesia.  This may last for the next 24 hours.  Someone should plan on staying with you for the first 24 hours for your safety.    3. Do not make any important legal decisions or sign any legal papers for the next 24 hours.      4. Eat and drink lightly today.  Start off with liquids, jello, soup, crackers or other bland foods at first. You may advance your diet tomorrow as tolerated as long as you do not experience any nausea or vomiting.     5. If skin glue (Dermabond) was used, your incisions are protected and covered.  The invisible glue will dissolve on its own as your incision heals. If you have dressings, you may remove your outer dressings in 3 days.  The white tapes called steri-strips should stay in place.  They will fall off on their own in 1-2 weeks.  Do not worry if they come off sooner.      6. If you have dressings, you may notice some bleeding/drainage on your outer dressings. A little bloody drainage is normal. If the bleeding/drainage is such that the bandage cannot absorb it, remove the dressing, apply clean gauze and apply firm pressure for a full 15 minutes.  If the bleeding continues, please call me.    7. You may shower tomorrow allowing water to run over the incisions; however, do not scrub the incisions.   No tub baths until your incisions are completely healed.         8. You have received a prescription for a narcotic pain medicine, as you will have some pain following surgery.   You will not be totally pain free, but your pain medicine should make the pain tolerable.  Please take your pain medicine as prescribed and always take your pills with food  to prevent nausea. If you are having severe pain that cannot be controlled by the pain medicine, please contact me.      9. You have also received a prescription for an anti-nausea medicine.  Please take this as prescribed for any nausea or vomiting.  Nausea could be a result of the anesthesia or a result of the narcotic pain medicine.  If you experience severe nausea and vomiting that cannot be controlled by the nausea medicine, please call me.            10. It is not unusual to experience pain/discomfort in your shoulders or under your ribs after surgery.  It is from the gas used during the laparoscopic procedure and usually lasts 1-3 days.  The prescription pain medicine is used to treat the surgical pain and does not typically alleviate this “gassy” pain.     11. No driving for 24 hours and for as long as you are taking your prescription pain medicine.      12. You will need to call the office at [phone] to schedule a follow-up appointment in 6-10 days.     13. Remember to contact me for any of the following:    • Fever > 101 degrees  • Severe pain that cannot be controlled by taking your pain pills  • Severe nausea or vomiting that cannot be controlled by taking your nausea pills  • Significant bleeding of your incisions  • Drainage that has a bad smell or is yellow or green in appearance  • Any other questions or concerns

## 2019-11-06 LAB
LAB AP CASE REPORT: NORMAL
PATH REPORT.FINAL DX SPEC: NORMAL
PATH REPORT.GROSS SPEC: NORMAL

## 2019-11-11 ENCOUNTER — OFFICE VISIT (OUTPATIENT)
Dept: BARIATRICS/WEIGHT MGMT | Facility: CLINIC | Age: 29
End: 2019-11-11

## 2019-11-11 VITALS
WEIGHT: 252.4 LBS | HEART RATE: 76 BPM | SYSTOLIC BLOOD PRESSURE: 116 MMHG | HEIGHT: 69 IN | DIASTOLIC BLOOD PRESSURE: 75 MMHG | BODY MASS INDEX: 37.38 KG/M2 | TEMPERATURE: 98.8 F | RESPIRATION RATE: 16 BRPM

## 2019-11-11 DIAGNOSIS — Z51.89 VISIT FOR WOUND CHECK: Primary | ICD-10-CM

## 2019-11-11 PROCEDURE — 99024 POSTOP FOLLOW-UP VISIT: CPT | Performed by: SURGERY

## 2019-11-11 NOTE — PROGRESS NOTES
[facility]  [address] [place] [address]  [address] [place] [address]  Dept: [phone]  11/11/2019      Gwen Barnes.  [id number]  [id number]  1990  female      Chief Complaint   Patient presents with   • Post-op     cholycystectomy       BH Post-Op Bariatric Surgery:   Gwen Barnes is status post procedure listed above  HPI:       Today's weight is 114 kg (252 lb 6.4 oz) pounds, today's BMI is Body mass index is 37.27 kg/m²., she has a  loss of 26 pounds since the last visit and her weight loss since surgery is 70 pounds. The patient reports a decreased portion size and loss of appetite.      Gwen Barnes denies gerd. She just had lab nadya last week.      Diet and Exercise: Diet history reviewed and discussed with the patient. Weight loss/gains to date discussed with the patient. The patient states they are eating 40-50 grams of protein per day. She reports eating 3 meals per day, a typical portion size of 1 cup, eating 1 snacks per day, drinking 8 or more 8-oz. glasses of water per day, no carbonated beverage consumption and exercising regularly.         Supplements: protein shakes.     Review of Systems    Patient Active Problem List   Diagnosis   • Obesity, Class III, BMI 40-49.9 (morbid obesity) (CMS/HCC)   • Anxiety   • Body mass index 45.0-49.9, adult (CMS/HCC)   • Encounter for other preprocedural examination   • Heartburn   • Malaise and fatigue   • Polycystic ovaries   • Morbid (severe) obesity due to excess calories (CMS/HCC)       Past Medical History:   Diagnosis Date   • Anxiety    • Fatigue    • Heartburn    • Morbid obesity (CMS/HCC)    • PCOS (polycystic ovarian syndrome)        The following portions of the patient's history were reviewed and updated as appropriate: allergies, current medications, past family history, past medical history, past social history, past surgical history and problem  list.    Vitals:    11/11/19 1528   BP: 116/75   Pulse: 76   Resp: 16   Temp: 98.8 °F (37.1 °C)       Physical Exam  Incisions healing well    Assessment:   Post-op, the patient is doing well.     Plan:     Encouraged patient to be sure to get plenty of lean protein per day through small frequent meals all with a protein source.   Activity restrictions: none.   Recommended patient be sure to get at least 70 grams of protein per day by eating small, frequent meals all with high lean protein choices. Be sure to limit/cut back on daily carbohydrate intake. Discussed with the patient the recommended amount of water per day to intake- half of body weight in ounces. Reviewed vitamin requirements. Be sure to do routine exercise, 150 minutes per week minimum, including both cardio and strength training.     Instructions / Recommendations: dietary counseling recommended, recommended a daily protein intake of  grams, vitamin supplement(s) recommended, recommended exercising at least 150 minutes per week, behavior modifications recommended and instructed to call the office for concerns, questions, or problems.     The patient was instructed to follow up in 3 month .     The patient was counseled regarding. Total time spent face to face was 15 minutes and 15 minutes was spent counseling.

## 2021-05-15 ENCOUNTER — APPOINTMENT (OUTPATIENT)
Dept: GENERAL RADIOLOGY | Facility: HOSPITAL | Age: 31
End: 2021-05-15

## 2021-05-15 ENCOUNTER — HOSPITAL ENCOUNTER (EMERGENCY)
Facility: HOSPITAL | Age: 31
Discharge: HOME | End: 2021-05-15
Admitting: EMERGENCY MEDICINE

## 2021-05-15 VITALS
OXYGEN SATURATION: 100 % | WEIGHT: 246.03 LBS | DIASTOLIC BLOOD PRESSURE: 71 MMHG | HEART RATE: 78 BPM | HEIGHT: 68 IN | RESPIRATION RATE: 18 BRPM | SYSTOLIC BLOOD PRESSURE: 158 MMHG | TEMPERATURE: 97.6 F | BODY MASS INDEX: 37.29 KG/M2

## 2021-05-15 DIAGNOSIS — S70.12XA CONTUSION OF LEFT THIGH, INITIAL ENCOUNTER: ICD-10-CM

## 2021-05-15 DIAGNOSIS — T07.XXXA ABRASIONS OF MULTIPLE SITES: ICD-10-CM

## 2021-05-15 DIAGNOSIS — R07.89 CHEST WALL PAIN: ICD-10-CM
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
## 2021-05-15 LAB — B-HCG UR QL: NEGATIVE

## 2021-05-15 PROCEDURE — 99283 EMERGENCY DEPT VISIT LOW MDM: CPT

## 2021-05-15 PROCEDURE — 81025 URINE PREGNANCY TEST: CPT | Performed by: NURSE PRACTITIONER

## 2021-05-15 PROCEDURE — 93005 ELECTROCARDIOGRAM TRACING: CPT

## 2021-05-15 PROCEDURE — 71045 X-RAY EXAM CHEST 1 VIEW: CPT

## 2021-05-15 PROCEDURE — 73552 X-RAY EXAM OF FEMUR 2/>: CPT

## 2021-05-15 RX ORDER — NAPROXEN 500 MG/1
500 TABLET ORAL 2 TIMES DAILY PRN
Qty: 10 TABLET | Refills: 0 | Status: SHIPPED | OUTPATIENT
Start: 2021-05-15 | End: 2021-08-12

## 2021-05-15 RX ORDER — CYCLOBENZAPRINE HCL 10 MG
10 TABLET ORAL 3 TIMES DAILY PRN
Qty: 10 TABLET | Refills: 0 | Status: SHIPPED | OUTPATIENT
Start: 2021-05-15 | End: 2021-08-12

## 2021-05-16 LAB — QT INTERVAL: 401 MS
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
## 2021-05-16 NOTE — DISCHARGE INSTRUCTIONS
Please help your primary care provider, call for an appointment  Return to the ED for worsening symptoms  Keep abrasions clean and dry, apply bacitracin twice daily

## 2021-06-25 ENCOUNTER — OFFICE VISIT (OUTPATIENT)
Dept: BARIATRICS/WEIGHT MGMT | Facility: CLINIC | Age: 31
End: 2021-06-25

## 2021-06-25 VITALS
HEIGHT: 68 IN | TEMPERATURE: 98.5 F | WEIGHT: 241 LBS | DIASTOLIC BLOOD PRESSURE: 67 MMHG | SYSTOLIC BLOOD PRESSURE: 115 MMHG | OXYGEN SATURATION: 98 % | BODY MASS INDEX: 36.53 KG/M2 | HEART RATE: 83 BPM

## 2021-06-25 DIAGNOSIS — E66.9 OBESITY, CLASS II, BMI 35-39.9: ICD-10-CM

## 2021-06-25 DIAGNOSIS — T73.0XXA HUNGER, INITIAL ENCOUNTER: Primary | ICD-10-CM

## 2021-06-25 PROCEDURE — 99214 OFFICE O/P EST MOD 30 MIN: CPT | Performed by: NURSE PRACTITIONER

## 2021-06-25 RX ORDER — PHENTERMINE HYDROCHLORIDE 37.5 MG/1
37.5 CAPSULE ORAL EVERY MORNING
Qty: 30 CAPSULE | Refills: 0 | Status: SHIPPED | OUTPATIENT
Start: 2021-06-25 | End: 2021-08-02 | Stop reason: SDUPTHER

## 2021-06-25 RX ORDER — AMOXICILLIN 500 MG/1
CAPSULE ORAL
COMMUNITY
Start: 2021-06-01 | End: 2021-08-12

## 2021-06-25 RX ORDER — NAPROXEN 500 MG/1
TABLET ORAL
COMMUNITY
Start: 2021-05-17 | End: 2021-08-12

## 2021-08-02 ENCOUNTER — OFFICE VISIT (OUTPATIENT)
Dept: BARIATRICS/WEIGHT MGMT | Facility: CLINIC | Age: 31
End: 2021-08-02

## 2021-08-02 VITALS
WEIGHT: 254.4 LBS | TEMPERATURE: 98.6 F | BODY MASS INDEX: 38.55 KG/M2 | DIASTOLIC BLOOD PRESSURE: 78 MMHG | HEIGHT: 68 IN | HEART RATE: 70 BPM | OXYGEN SATURATION: 100 % | SYSTOLIC BLOOD PRESSURE: 118 MMHG | RESPIRATION RATE: 16 BRPM

## 2021-08-02 DIAGNOSIS — K21.9 GASTROESOPHAGEAL REFLUX DISEASE, UNSPECIFIED WHETHER ESOPHAGITIS PRESENT: ICD-10-CM

## 2021-08-02 DIAGNOSIS — E66.9 OBESITY, CLASS II, BMI 35-39.9: Primary | ICD-10-CM

## 2021-08-02 DIAGNOSIS — T73.0XXA HUNGER, INITIAL ENCOUNTER: ICD-10-CM

## 2021-08-02 PROCEDURE — 99214 OFFICE O/P EST MOD 30 MIN: CPT | Performed by: NURSE PRACTITIONER

## 2021-08-02 RX ORDER — OMEPRAZOLE 40 MG/1
40 CAPSULE, DELAYED RELEASE ORAL DAILY
Qty: 30 CAPSULE | Refills: 6 | Status: SHIPPED | OUTPATIENT
Start: 2021-08-02 | End: 2021-09-15 | Stop reason: SDUPTHER

## 2021-08-02 RX ORDER — PHENTERMINE HYDROCHLORIDE 37.5 MG/1
37.5 CAPSULE ORAL EVERY MORNING
Qty: 60 CAPSULE | Refills: 0 | Status: SHIPPED | OUTPATIENT
Start: 2021-08-02 | End: 2021-09-15

## 2021-08-02 NOTE — PROGRESS NOTES
[facility] BAR SURG [facility] BARIATRIC SURGERY  [address] [place] [address]  [address] [place] [address]  Dept: [phone]  8/2/2021      Gwen Barnes.  [id number]  [id number]  1990  female     1 month phentermine 1 month    BH Post-Op Bariatric Surgery:   Gwen Barnes is status post procedure listed above  HPI:     Wt Readings from Last 10 Encounters:   08/02/21 115 kg (254 lb 6.4 oz)   06/25/21 109 kg (241 lb)   05/15/21 112 kg (246 lb 0.5 oz)   11/11/19 114 kg (252 lb 6.4 oz)   11/05/19 117 kg (258 lb 9.6 oz)   11/01/19 118 kg (260 lb 6.4 oz)   10/30/19 120 kg (264 lb 5.3 oz)   09/09/19 126 kg (278 lb 6.4 oz)   08/15/19 132 kg (291 lb 6.4 oz)   08/12/19 135 kg (297 lb 9.6 oz)        Today's weight is 115 kg (254 lb 6.4 oz) pounds, today's BMI is Body mass index is 38.68 kg/m².,@ has a  gain of 13 pounds since the last visit and@ weight loss since surgery is 70 pounds. The patient reports a decreased portion size and loss of appetite.      Gwen Barnes is having a lot of GERD , no nausea or vomiting      Diet and Exercise: Diet history reviewed and discussed with the patient. Weight loss/gains to date discussed with the patient. The patient states they are eating 50 grams of protein per day. She reports eating 2-3 meals per day, a typical portion size of 1 cup, eating 1-2 snacks per day, drinking 8 or more 8-oz. glasses of water per day, no carbonated beverage consumption and exercising regularly.       10 days on the medication- phentermine - helping with hunger   She did not start the phentermine until 10 days ago due to high out of pocket cost. She was able to afford the medication using [facility]    Wakes up around 3:45pm- eats around 5 pm- turkey sandwich or HMR shakes   11 pm- chicken - grilled or BBQ pork chops or hamburger helper, tuna casserole  with veggies - corn, or steamed broccoli, canned mixed veggies, Baked potato, mashed potatoes    Snacks: protein bar    5 am - breakfast food prn- sausage ramila   Drinks: sweet tea, krapris sun, water, milk  Exercise: walking dogs 45 minutes each day , sit ups 10-20 at a time, wall sit ups         Supplements: HMR shakes. Protein bars .     Review of Systems   Constitutional: Negative.    Respiratory: Negative.    Cardiovascular: Negative.    Gastrointestinal: Negative.    Musculoskeletal: Positive for back pain.       Patient Active Problem List   Diagnosis   • Obesity, Class III, BMI 40-49.9 (morbid obesity) (CMS/HCC)   • Anxiety   • Body mass index 45.0-49.9, adult (CMS/HCC)   • Encounter for other preprocedural examination   • Heartburn   • Malaise and fatigue   • Polycystic ovaries   • Morbid (severe) obesity due to excess calories (CMS/HCC)       Past Medical History:   Diagnosis Date   • Anxiety    • Fatigue    • Heartburn    • Morbid obesity (CMS/HCC)    • PCOS (polycystic ovarian syndrome)        The following portions of the patient's history were reviewed and updated as appropriate: allergies, current medications, past family history, past medical history, past social history, past surgical history and problem list.    Vitals:    08/02/21 1154   BP: 118/78   Pulse: 70   Resp: 16   Temp: 98.6 °F (37 °C)   SpO2: 100%       Physical Exam  Constitutional:       Appearance: Normal appearance. She is obese.   Cardiovascular:      Rate and Rhythm: Normal rate and regular rhythm.   Pulmonary:      Effort: Pulmonary effort is normal.      Breath sounds: Normal breath sounds.   Abdominal:      General: Abdomen is flat. Bowel sounds are normal.      Palpations: Abdomen is soft.   Skin:     General: Skin is warm and dry.   Neurological:      General: No focal deficit present.      Mental Status: She is alert and oriented to person, place, and time.   Psychiatric:         Mood and Affect: Mood normal.         Behavior: Behavior normal.         Thought Content: Thought content normal.         Judgment: Judgment  normal.           Assessment:   Post-op, the patient has been on phentermine 10 days now. She feels like it is helping with her hunger. She has had a 13 pound weight gain since last visit, but she was unable to start the phentermine when prescribed due to high out of pocket cost. Will go ahead and refill for 2 more months. Pt denies any hx of uncontrolled HTN, seizures, tachycardia or chance of pregnancy while on the medication. Pt encouraged to call the office if having any of these symptoms. Plan to follow up in 2 months.     encourage 60+ grams of protein a day and 20-30 minutes of exercise outside of normal activity 2-3 days a week    Will prescribe omeprazole today to help with GERD- samples given to pt today in office     Plan:     Encouraged patient to be sure to get plenty of lean protein per day through small frequent meals all with a protein source.   Activity restrictions: none.   Recommended patient be sure to get at least 70 grams of protein per day by eating small, frequent meals all with high lean protein choices. Be sure to limit/cut back on daily carbohydrate intake. Discussed with the patient the recommended amount of water per day to intake- half of body weight in ounces. Reviewed vitamin requirements. Be sure to do routine exercise, 150 minutes per week minimum, including both cardio and strength training.     Instructions / Recommendations: dietary counseling recommended, recommended a daily protein intake of  grams, vitamin supplement(s) recommended, recommended exercising at least 150 minutes per week, behavior modifications recommended and instructed to call the office for concerns, questions, or problems.     The patient was instructed to follow up in 2 months.     The patient was counseled regarding. Total time spent face to face was 30 minutes and 25 minutes was spent counseling.     LA Epps  [facility] Bariatrics and General Surgery

## 2021-08-12 ENCOUNTER — OFFICE VISIT (OUTPATIENT)
Dept: BARIATRICS/WEIGHT MGMT | Facility: CLINIC | Age: 31
End: 2021-08-12

## 2021-08-12 VITALS
TEMPERATURE: 98.7 F | SYSTOLIC BLOOD PRESSURE: 115 MMHG | RESPIRATION RATE: 18 BRPM | HEIGHT: 68 IN | DIASTOLIC BLOOD PRESSURE: 77 MMHG | HEART RATE: 75 BPM | WEIGHT: 250.2 LBS | OXYGEN SATURATION: 97 % | BODY MASS INDEX: 37.92 KG/M2

## 2021-08-12 DIAGNOSIS — T73.0XXS HUNGER PAIN, SEQUELA: Primary | ICD-10-CM

## 2021-08-12 DIAGNOSIS — E66.9 OBESITY, CLASS II, BMI 35-39.9: ICD-10-CM

## 2021-08-12 PROCEDURE — 99214 OFFICE O/P EST MOD 30 MIN: CPT | Performed by: NURSE PRACTITIONER

## 2021-08-12 RX ORDER — TOPIRAMATE 25 MG/1
TABLET ORAL
Qty: 53 TABLET | Refills: 0 | Status: SHIPPED | OUTPATIENT
Start: 2021-08-12 | End: 2021-09-15

## 2021-08-12 NOTE — PROGRESS NOTES
[facility] BAR SURG [facility] BARIATRIC SURGERY  [address] [place] [address]  [address] [place] [address]  Dept: [phone]  8/12/2021      Gwen Barnes.  [id number]  [id number]  1990  female      Chief Complaint   Patient presents with   • Follow-up     Phentermine med check       BH Post-Op Bariatric Surgery:   Gwen Barnes is status post procedure listed above  HPI:     Wt Readings from Last 10 Encounters:   08/12/21 113 kg (250 lb 3.2 oz)   08/02/21 115 kg (254 lb 6.4 oz)   06/25/21 109 kg (241 lb)   05/15/21 112 kg (246 lb 0.5 oz)   11/11/19 114 kg (252 lb 6.4 oz)   11/05/19 117 kg (258 lb 9.6 oz)   11/01/19 118 kg (260 lb 6.4 oz)   10/30/19 120 kg (264 lb 5.3 oz)   09/09/19 126 kg (278 lb 6.4 oz)   08/15/19 132 kg (291 lb 6.4 oz)        Today's weight is 113 kg (250 lb 3.2 oz) pounds, today's BMI is Body mass index is 38.04 kg/m².,@ has a  loss of 4 pounds since the last visit and@ weight loss since surgery is 74 pounds. The patient reports a decreased portion size and loss of appetite.      Gwen Barnes is having some nausea, no vomiting, GERD every day - takes omeprazole and helps     Diet and Exercise: Diet history reviewed and discussed with the patient. Weight loss/gains to date discussed with the patient. The patient states they are eating 60 grams of protein per day. She reports eating 2 meals per day, a typical portion size of 1-1/2 cup, eating 1 snacks per day, drinking 6 or more 8-oz. glasses of water per day, no carbonated beverage consumption and exercising regularly.     Baked chicken , broccoli, flat breads- ham and cheese   Fair life protein shakes , HMR shake, premier protein   Pop corn   Eggs, sausage / ommlet /scrambled eggs   Drinks: water, gaterade 0  Exercise: walking dog two times a day, in home exercises ( crunches or squats)     Having horrible taste in mouth , weakness while taking phentermine- no chest pain or  palpitations or seizures    Supplements: protein shakes.   No MVI and calium    Review of Systems   Constitutional: Positive for activity change, appetite change and fatigue.   Respiratory: Negative.    Cardiovascular: Negative.    Gastrointestinal: Negative.    Musculoskeletal: Negative.        Patient Active Problem List   Diagnosis   • Obesity, Class III, BMI 40-49.9 (morbid obesity) (CMS/HCC)   • Anxiety   • Body mass index 45.0-49.9, adult (CMS/HCC)   • Encounter for other preprocedural examination   • Heartburn   • Malaise and fatigue   • Polycystic ovaries   • Morbid (severe) obesity due to excess calories (CMS/HCC)       Past Medical History:   Diagnosis Date   • Anxiety    • Fatigue    • Heartburn    • Morbid obesity (CMS/HCC)    • PCOS (polycystic ovarian syndrome)        The following portions of the patient's history were reviewed and updated as appropriate: allergies, current medications, past family history, past medical history, past social history, past surgical history and problem list.    Vitals:    08/12/21 1103   BP: 115/77   Pulse: 75   Resp: 18   Temp: 98.7 °F (37.1 °C)   SpO2: 97%       Physical Exam  Constitutional:       Appearance: Normal appearance. She is obese.   Cardiovascular:      Rate and Rhythm: Normal rate and regular rhythm.   Pulmonary:      Effort: Pulmonary effort is normal.      Breath sounds: Normal breath sounds.   Abdominal:      General: Abdomen is flat. Bowel sounds are normal.      Palpations: Abdomen is soft.   Skin:     General: Skin is warm and dry.   Neurological:      General: No focal deficit present.      Mental Status: She is alert and oriented to person, place, and time.   Psychiatric:         Mood and Affect: Mood normal.         Behavior: Behavior normal.         Thought Content: Thought content normal.         Judgment: Judgment normal.           Assessment:   Post-op, the patient tried phentermine for 1 month and did not like the way it made her feel. She  did have a 4 pound weight loss with it, but she would like to stop it and try something different. She is on a very limited income and saxenda and qsymia are not covered with insurance. Will try Topamax alone for weight loss. Plan to follow up in 1 month for med check. Encourage 60-80 grams of protein and 20-30 minutes of exercise 2-3 days a week.  Pt also encouraged to take MVI and calcium citrate.     Plan:     Encouraged patient to be sure to get plenty of lean protein per day through small frequent meals all with a protein source.   Activity restrictions: none.   Recommended patient be sure to get at least 70 grams of protein per day by eating small, frequent meals all with high lean protein choices. Be sure to limit/cut back on daily carbohydrate intake. Discussed with the patient the recommended amount of water per day to intake- half of body weight in ounces. Reviewed vitamin requirements. Be sure to do routine exercise, 150 minutes per week minimum, including both cardio and strength training.     Instructions / Recommendations: dietary counseling recommended, recommended a daily protein intake of  grams, vitamin supplement(s) recommended, recommended exercising at least 150 minutes per week, behavior modifications recommended and instructed to call the office for concerns, questions, or problems.     The patient was instructed to follow up in 1 month for med check .     The patient was counseled regarding. Total time spent face to face was 30 minutes and 25 minutes was spent counseling.     LA Epps  [facility] Bariatrics and General surgery

## 2021-09-15 ENCOUNTER — OFFICE VISIT (OUTPATIENT)
Dept: BARIATRICS/WEIGHT MGMT | Facility: CLINIC | Age: 31
End: 2021-09-15

## 2021-09-15 VITALS
WEIGHT: 252.4 LBS | SYSTOLIC BLOOD PRESSURE: 113 MMHG | DIASTOLIC BLOOD PRESSURE: 72 MMHG | TEMPERATURE: 98.6 F | RESPIRATION RATE: 16 BRPM | BODY MASS INDEX: 38.25 KG/M2 | HEIGHT: 68 IN | OXYGEN SATURATION: 96 % | HEART RATE: 83 BPM

## 2021-09-15 DIAGNOSIS — F32.A DEPRESSION, UNSPECIFIED DEPRESSION TYPE: ICD-10-CM

## 2021-09-15 DIAGNOSIS — E66.9 OBESITY, CLASS II, BMI 35-39.9: Primary | ICD-10-CM

## 2021-09-15 DIAGNOSIS — T73.0XXS HUNGRY, SEQUELA: ICD-10-CM

## 2021-09-15 DIAGNOSIS — K21.9 GASTROESOPHAGEAL REFLUX DISEASE, UNSPECIFIED WHETHER ESOPHAGITIS PRESENT: ICD-10-CM

## 2021-09-15 PROCEDURE — 99214 OFFICE O/P EST MOD 30 MIN: CPT | Performed by: NURSE PRACTITIONER

## 2021-09-15 RX ORDER — PHENTERMINE HYDROCHLORIDE 15 MG/1
15 CAPSULE ORAL EVERY MORNING
Qty: 30 CAPSULE | Refills: 0 | Status: ON HOLD | OUTPATIENT
Start: 2021-09-15 | End: 2022-03-22

## 2021-09-15 RX ORDER — ONDANSETRON 8 MG/1
8 TABLET, ORALLY DISINTEGRATING ORAL EVERY 8 HOURS PRN
Qty: 30 TABLET | Refills: 5 | Status: SHIPPED | OUTPATIENT
Start: 2021-09-15 | End: 2022-02-18 | Stop reason: SDUPTHER

## 2021-09-15 RX ORDER — SUCRALFATE 1 G/1
1 TABLET ORAL 4 TIMES DAILY
Qty: 120 TABLET | Refills: 3 | Status: SHIPPED | OUTPATIENT
Start: 2021-09-15 | End: 2022-02-18 | Stop reason: SDUPTHER

## 2021-09-15 RX ORDER — TOPIRAMATE 25 MG/1
25 TABLET ORAL 2 TIMES DAILY
Qty: 60 TABLET | Refills: 0 | Status: SHIPPED | OUTPATIENT
Start: 2021-09-15 | End: 2021-10-20

## 2021-09-15 RX ORDER — OMEPRAZOLE 40 MG/1
40 CAPSULE, DELAYED RELEASE ORAL DAILY
Qty: 30 CAPSULE | Refills: 6 | Status: SHIPPED | OUTPATIENT
Start: 2021-09-15 | End: 2022-02-18 | Stop reason: SDUPTHER

## 2021-09-15 NOTE — PROGRESS NOTES
[facility] BAR SURG [facility] BARIATRIC SURGERY  [address] [place] [address]  [address] [place] [address]  Dept: [phone]  9/15/2021      Gwen Barnes.  [id number]  [id number]  1990  female      Chief Complaint   Patient presents with   • Follow-up     Med check       BH Post-Op Bariatric Surgery:   Gwen Barnes is status post procedure listed above  HPI:     Wt Readings from Last 10 Encounters:   09/15/21 114 kg (252 lb 6.4 oz)   08/12/21 113 kg (250 lb 3.2 oz)   08/02/21 115 kg (254 lb 6.4 oz)   06/25/21 109 kg (241 lb)   05/15/21 112 kg (246 lb 0.5 oz)   11/11/19 114 kg (252 lb 6.4 oz)   11/05/19 117 kg (258 lb 9.6 oz)   11/01/19 118 kg (260 lb 6.4 oz)   10/30/19 120 kg (264 lb 5.3 oz)   09/09/19 126 kg (278 lb 6.4 oz)        Today's weight is 114 kg (252 lb 6.4 oz) pounds, today's BMI is Body mass index is 38.38 kg/m².,@ has a  gain of 2 pounds since the last visit and@ weight loss since surgery is 70 pounds. The patient reports a decreased portion size and loss of appetite.      Gwen Barnes is having nausea ( almost daily)  and vomiting PRN . Having a lot of GERD - even while on omeprazole      Diet and Exercise: Diet history reviewed and discussed with the patient. Weight loss/gains to date discussed with the patient. The patient states they are eating 60 grams of protein per day. She reports eating 3 meals per day, a typical portion size of 1 cup, eating 1 snacks per day, drinking 8 or more 8-oz. glasses of water per day, no carbonated beverage consumption and exercising regularly.     Breakfast: yogurt cup   Lunch: veggies with protein shake (HMR)   Dinner: baked chicken/ pork, hamburger , veggies - steamed   Snacks: protein bars, HMR shake   Drinks: water, sweet tea 1 a week , no carbonation   Exercise: walking dogs separately     Supplements: protein shakes, protein bars.   MVi and calcium     Pt is having a lot of depression after the  death of her father and uncle. She would like a referral to see Dr. Fraga     Review of Systems   Constitutional: Negative for fatigue.   Respiratory: Negative.    Cardiovascular: Negative.    Gastrointestinal: Negative.    Musculoskeletal: Negative.        Patient Active Problem List   Diagnosis   • Obesity, Class III, BMI 40-49.9 (morbid obesity) (CMS/HCC)   • Anxiety   • Body mass index 45.0-49.9, adult (CMS/HCC)   • Encounter for other preprocedural examination   • Heartburn   • Malaise and fatigue   • Polycystic ovaries   • Morbid (severe) obesity due to excess calories (CMS/HCC)       Past Medical History:   Diagnosis Date   • Anxiety    • Fatigue    • GERD (gastroesophageal reflux disease)    • Heartburn    • Morbid obesity (CMS/HCC)    • PCOS (polycystic ovarian syndrome)        The following portions of the patient's history were reviewed and updated as appropriate: allergies, current medications, past family history, past medical history, past social history, past surgical history and problem list.    Vitals:    09/15/21 1320   BP: 113/72   Pulse: 83   Resp: 16   Temp: 98.6 °F (37 °C)   SpO2: 96%       Physical Exam  Constitutional:       Appearance: Normal appearance. She is obese.   Cardiovascular:      Rate and Rhythm: Normal rate and regular rhythm.   Pulmonary:      Effort: Pulmonary effort is normal.      Breath sounds: Normal breath sounds.   Abdominal:      General: Abdomen is flat. Bowel sounds are normal.      Palpations: Abdomen is soft.   Skin:     General: Skin is warm and dry.   Neurological:      General: No focal deficit present.      Mental Status: She is alert and oriented to person, place, and time.   Psychiatric:         Mood and Affect: Mood normal.         Behavior: Behavior normal.         Thought Content: Thought content normal.         Judgment: Judgment normal.           Assessment:   Post-op, the patient has actually gained 2 pounds while taking the Topamax. It did help slightly  with her hunger, but not a considerable difference. She is also having a lot of GERD today. She is taking omeprazole and is even taking it twice daily with no relief. I encouraged the pt to take omeprazole 40 mg in am and pepsid or famotidine in the pm. Will refill omeprazole today. Will also add Carafate. If GERD continues despite these medication adjustment, pt will need an EGD to further evaluate. For weight loss, will refill Topamax today and also add 15 mg daily phentermine. Plan to follow up in 1 month for medication check. At this time will also further evaluate ongoing GERD. Pt instructed to call the office if having any chest pain/ shortness of breath/ new onset seizures, chance of pregnancy or hypertension/ tachycardia while taking the phentermine.       Pt is also having increased depression after the death of her father . Unfortunately her depression if likely hindering her weight loss. She is interested in a referral to Dr. Fraga today. Will order this referral now.   Plan:     Encouraged patient to be sure to get plenty of lean protein per day through small frequent meals all with a protein source.   Activity restrictions: none.   Recommended patient be sure to get at least 70 grams of protein per day by eating small, frequent meals all with high lean protein choices. Be sure to limit/cut back on daily carbohydrate intake. Discussed with the patient the recommended amount of water per day to intake- half of body weight in ounces. Reviewed vitamin requirements. Be sure to do routine exercise, 150 minutes per week minimum, including both cardio and strength training.     Instructions / Recommendations: dietary counseling recommended, recommended a daily protein intake of  grams, vitamin supplement(s) recommended, recommended exercising at least 150 minutes per week, behavior modifications recommended and instructed to call the office for concerns, questions, or problems.     The patient was  instructed to follow up in 1 month for med check .     The patient was counseled regarding. Total time spent face to face was 30 minutes and 25 minutes was spent counseling.     LA Epps  [facility] Bariatrics and General Surgery

## 2021-10-18 ENCOUNTER — LAB (OUTPATIENT)
Dept: LAB | Facility: HOSPITAL | Age: 31
End: 2021-10-18

## 2021-10-18 DIAGNOSIS — E66.9 OBESITY, CLASS II, BMI 35-39.9: ICD-10-CM

## 2021-10-18 PROCEDURE — 84590 ASSAY OF VITAMIN A: CPT

## 2021-10-18 PROCEDURE — 36415 COLL VENOUS BLD VENIPUNCTURE: CPT

## 2021-10-18 PROCEDURE — 83970 ASSAY OF PARATHORMONE: CPT

## 2021-10-18 PROCEDURE — 85025 COMPLETE CBC W/AUTO DIFF WBC: CPT

## 2021-10-18 PROCEDURE — 82728 ASSAY OF FERRITIN: CPT

## 2021-10-18 PROCEDURE — 83921 ORGANIC ACID SINGLE QUANT: CPT

## 2021-10-18 PROCEDURE — 84446 ASSAY OF VITAMIN E: CPT

## 2021-10-18 PROCEDURE — 84630 ASSAY OF ZINC: CPT

## 2021-10-18 PROCEDURE — 84100 ASSAY OF PHOSPHORUS: CPT

## 2021-10-18 PROCEDURE — 84134 ASSAY OF PREALBUMIN: CPT

## 2021-10-18 PROCEDURE — 84597 ASSAY OF VITAMIN K: CPT

## 2021-10-18 PROCEDURE — 84466 ASSAY OF TRANSFERRIN: CPT

## 2021-10-18 PROCEDURE — 83036 HEMOGLOBIN GLYCOSYLATED A1C: CPT

## 2021-10-18 PROCEDURE — 83735 ASSAY OF MAGNESIUM: CPT

## 2021-10-18 PROCEDURE — 84425 ASSAY OF VITAMIN B-1: CPT

## 2021-10-18 PROCEDURE — 80053 COMPREHEN METABOLIC PANEL: CPT

## 2021-10-18 PROCEDURE — 82746 ASSAY OF FOLIC ACID SERUM: CPT

## 2021-10-18 PROCEDURE — 83540 ASSAY OF IRON: CPT

## 2021-10-19 LAB
ALBUMIN SERPL-MCNC: 4.2 G/DL (ref 3.5–5.2)
ALBUMIN/GLOB SERPL: 1.7 G/DL
ALP SERPL-CCNC: 83 U/L (ref 39–117)
ALT SERPL W P-5'-P-CCNC: 15 U/L (ref 1–33)
ANION GAP SERPL CALCULATED.3IONS-SCNC: 11.8 MMOL/L (ref 5–15)
AST SERPL-CCNC: 13 U/L (ref 1–32)
BASOPHILS # BLD AUTO: 0.04 10*3/MM3 (ref 0–0.2)
BASOPHILS NFR BLD AUTO: 0.5 % (ref 0–1.5)
BILIRUB SERPL-MCNC: 0.3 MG/DL (ref 0–1.2)
BUN SERPL-MCNC: 10 MG/DL (ref 6–20)
BUN/CREAT SERPL: 13.9 (ref 7–25)
CALCIUM SPEC-SCNC: 8.3 MG/DL (ref 8.6–10.5)
CHLORIDE SERPL-SCNC: 102 MMOL/L (ref 98–107)
CO2 SERPL-SCNC: 25.2 MMOL/L (ref 22–29)
CREAT SERPL-MCNC: 0.72 MG/DL (ref 0.57–1)
DEPRECATED RDW RBC AUTO: 37.9 FL (ref 37–54)
EOSINOPHIL # BLD AUTO: 0.04 10*3/MM3 (ref 0–0.4)
EOSINOPHIL NFR BLD AUTO: 0.5 % (ref 0.3–6.2)
ERYTHROCYTE [DISTWIDTH] IN BLOOD BY AUTOMATED COUNT: 12.6 % (ref 12.3–15.4)
FERRITIN SERPL-MCNC: 29.6 NG/ML (ref 13–150)
FOLATE SERPL-MCNC: 5.7 NG/ML (ref 4.78–24.2)
GFR SERPL CREATININE-BSD FRML MDRD: 96 ML/MIN/1.73
GLOBULIN UR ELPH-MCNC: 2.5 GM/DL
GLUCOSE SERPL-MCNC: 68 MG/DL (ref 65–99)
HBA1C MFR BLD: 5.1 % (ref 3.5–5.6)
HCT VFR BLD AUTO: 37.5 % (ref 34–46.6)
HGB BLD-MCNC: 12.6 G/DL (ref 12–15.9)
IMM GRANULOCYTES # BLD AUTO: 0.03 10*3/MM3 (ref 0–0.05)
IMM GRANULOCYTES NFR BLD AUTO: 0.4 % (ref 0–0.5)
IRON 24H UR-MRATE: 88 MCG/DL (ref 37–145)
IRON SATN MFR SERPL: 19 % (ref 20–50)
LYMPHOCYTES # BLD AUTO: 2.44 10*3/MM3 (ref 0.7–3.1)
LYMPHOCYTES NFR BLD AUTO: 32.4 % (ref 19.6–45.3)
MAGNESIUM SERPL-MCNC: 2.1 MG/DL (ref 1.6–2.6)
MCH RBC QN AUTO: 28.3 PG (ref 26.6–33)
MCHC RBC AUTO-ENTMCNC: 33.6 G/DL (ref 31.5–35.7)
MCV RBC AUTO: 84.1 FL (ref 79–97)
MONOCYTES # BLD AUTO: 0.44 10*3/MM3 (ref 0.1–0.9)
MONOCYTES NFR BLD AUTO: 5.8 % (ref 5–12)
NEUTROPHILS NFR BLD AUTO: 4.55 10*3/MM3 (ref 1.7–7)
NEUTROPHILS NFR BLD AUTO: 60.4 % (ref 42.7–76)
NRBC BLD AUTO-RTO: 0 /100 WBC (ref 0–0.2)
PHOSPHATE SERPL-MCNC: 3.7 MG/DL (ref 2.5–4.5)
PLATELET # BLD AUTO: 361 10*3/MM3 (ref 140–450)
PMV BLD AUTO: 10.4 FL (ref 6–12)
POTASSIUM SERPL-SCNC: 3.4 MMOL/L (ref 3.5–5.2)
PREALB SERPL-MCNC: 21.7 MG/DL (ref 20–40)
PROT SERPL-MCNC: 6.7 G/DL (ref 6–8.5)
PTH-INTACT SERPL-MCNC: 33.2 PG/ML (ref 15–65)
RBC # BLD AUTO: 4.46 10*6/MM3 (ref 3.77–5.28)
SODIUM SERPL-SCNC: 139 MMOL/L (ref 136–145)
TIBC SERPL-MCNC: 468 MCG/DL (ref 298–536)
TRANSFERRIN SERPL-MCNC: 314 MG/DL (ref 200–360)
WBC # BLD AUTO: 7.54 10*3/MM3 (ref 3.4–10.8)

## 2021-10-20 ENCOUNTER — OFFICE VISIT (OUTPATIENT)
Dept: BARIATRICS/WEIGHT MGMT | Facility: CLINIC | Age: 31
End: 2021-10-20

## 2021-10-20 VITALS
HEART RATE: 77 BPM | TEMPERATURE: 98.2 F | BODY MASS INDEX: 38.95 KG/M2 | SYSTOLIC BLOOD PRESSURE: 103 MMHG | HEIGHT: 68 IN | WEIGHT: 257 LBS | RESPIRATION RATE: 16 BRPM | DIASTOLIC BLOOD PRESSURE: 73 MMHG | OXYGEN SATURATION: 95 %

## 2021-10-20 DIAGNOSIS — K21.9 GASTROESOPHAGEAL REFLUX DISEASE, UNSPECIFIED WHETHER ESOPHAGITIS PRESENT: ICD-10-CM

## 2021-10-20 DIAGNOSIS — E66.9 OBESITY, CLASS II, BMI 35-39.9: Primary | ICD-10-CM

## 2021-10-20 PROCEDURE — 99214 OFFICE O/P EST MOD 30 MIN: CPT | Performed by: NURSE PRACTITIONER

## 2021-10-20 RX ORDER — MULTIPLE VITAMINS W/ MINERALS TAB 9MG-400MCG
1 TAB ORAL DAILY
COMMUNITY

## 2021-10-20 RX ORDER — TOPIRAMATE 50 MG/1
50 TABLET, FILM COATED ORAL 2 TIMES DAILY
Qty: 60 TABLET | Refills: 3 | Status: SHIPPED | OUTPATIENT
Start: 2021-10-20 | End: 2022-09-19

## 2021-10-20 RX ORDER — FAMOTIDINE 40 MG/1
40 TABLET, FILM COATED ORAL NIGHTLY PRN
Qty: 90 TABLET | Refills: 1 | Status: SHIPPED | OUTPATIENT
Start: 2021-10-20 | End: 2022-09-19

## 2021-10-20 NOTE — PROGRESS NOTES
[facility] BAR SURG [facility] BARIATRIC SURGERY  [address] [place] [address]  [address] [place] [address]  Dept: [phone]  10/20/2021      Gwen Barnes.  [id number]  [id number]  1990  female      Chief Complaint   Patient presents with   • Follow-up     Med Ck - Cont heartburn       BH Post-Op Bariatric Surgery:   Gwen Barnes is status post procedure listed above  HPI:     Wt Readings from Last 10 Encounters:   10/20/21 117 kg (257 lb)   09/15/21 114 kg (252 lb 6.4 oz)   08/12/21 113 kg (250 lb 3.2 oz)   08/02/21 115 kg (254 lb 6.4 oz)   06/25/21 109 kg (241 lb)   05/15/21 112 kg (246 lb 0.5 oz)   11/11/19 114 kg (252 lb 6.4 oz)   11/05/19 117 kg (258 lb 9.6 oz)   11/01/19 118 kg (260 lb 6.4 oz)   10/30/19 120 kg (264 lb 5.3 oz)        Today's weight is 117 kg (257 lb) pounds, today's BMI is Body mass index is 39.08 kg/m².,@ has a  gain of 5 pounds since the last visit and@ weight loss since surgery is 64 pounds. The patient reports a decreased portion size and loss of appetite.      Gwen Barnes is having severre GERD ,  No nausea or vomiting      Diet and Exercise: Diet history reviewed and discussed with the patient. Weight loss/gains to date discussed with the patient. The patient states they are eating 60 grams of protein per day. She reports eating 3  meals per day, a typical portion size of 1 cup, eating 1 snacks per day, drinking 8 or more 8-oz. glasses of water per day, no carbonated beverage consumption and exercising regularly.       Breakfast: yogurt , protein bar, breakfast sandwich or scrambled eggs with sausage   hmr shakes , peppermint   Baked chicken or pork chops, chicken joanne , veggies, broccoli and asparagus and corn   Snacks: carrots and dip , protein shakes   Drinks: protein shakes, sweet tea 2 a week , un sweet tea, milk   Exercise: cleaning houses PRN      Supplements: protein shakes     Review of Systems    Constitutional: Positive for activity change, appetite change and fatigue.   Respiratory: Negative.    Cardiovascular: Negative.    Gastrointestinal: Negative.    Musculoskeletal: Negative.    Psychiatric/Behavioral:        Depression       Patient Active Problem List   Diagnosis   • Obesity, Class III, BMI 40-49.9 (morbid obesity) ([facility])   • Anxiety   • Body mass index 45.0-49.9, adult ([facility])   • Encounter for other preprocedural examination   • Heartburn   • Malaise and fatigue   • Polycystic ovaries   • Morbid (severe) obesity due to excess calories ([facility])       Past Medical History:   Diagnosis Date   • Anxiety    • Fatigue    • GERD (gastroesophageal reflux disease)    • Heartburn    • Morbid obesity ([facility])    • PCOS (polycystic ovarian syndrome)        The following portions of the patient's history were reviewed and updated as appropriate: allergies, current medications, past family history, past medical history, past social history, past surgical history and problem list.    Vitals:    10/20/21 1409   BP: 103/73   Pulse: 77   Resp: 16   Temp: 98.2 °F (36.8 °C)   SpO2: 95%       Physical Exam  Constitutional:       Appearance: Normal appearance. She is obese.   Cardiovascular:      Rate and Rhythm: Normal rate and regular rhythm.   Pulmonary:      Effort: Pulmonary effort is normal.      Breath sounds: Normal breath sounds.   Abdominal:      General: Abdomen is flat. Bowel sounds are normal.      Palpations: Abdomen is soft.   Skin:     General: Skin is warm and dry.   Neurological:      General: No focal deficit present.      Mental Status: She is alert and oriented to person, place, and time.   Psychiatric:         Mood and Affect: Mood normal.         Behavior: Behavior normal.         Thought Content: Thought content normal.         Judgment: Judgment normal.           Assessment:   Post-op, the patient is still struggling with weight loss. She has been on phentermine and Topamax for 1 month now and has  actually gained 5 pounds. Pt is getting discouraged with weight loss. She is also having extreme GERD which is waking her up at night. She has tried omeprazole and Carafate with little relief. Will add pepsid 40 mg at night. Plan to schedule EGD to evauate further.  I did have a long discussion with the patient about the gastric bypass and how this may be an option depending on the EGD and how severe her GERD is going forward. This may help with further weight loss as well. For weight loss, will try doubling the dose of the Topamax to 50 mg BID and stopping phentermine as pt feels like this is adding to her depression. Plan to follow up post EGD.     Pt is also having a lot of depression following the unexpected death of her father. She would like to see Dr. Fraga and this referral was sent last visit, but earliest they could work her in is 2/2022. Will follow up with their office and see if we can schedule this sooner. Unfortunately, this is probably having a lot to do with her stall and actually gain of weight.     Plan:     Encouraged patient to be sure to get plenty of lean protein per day through small frequent meals all with a protein source.   Activity restrictions: none.   Recommended patient be sure to get at least 70 grams of protein per day by eating small, frequent meals all with high lean protein choices. Be sure to limit/cut back on daily carbohydrate intake. Discussed with the patient the recommended amount of water per day to intake- half of body weight in ounces. Reviewed vitamin requirements. Be sure to do routine exercise, 150 minutes per week minimum, including both cardio and strength training.     Instructions / Recommendations: dietary counseling recommended, recommended a daily protein intake of  grams, vitamin supplement(s) recommended, recommended exercising at least 150 minutes per week, behavior modifications recommended and instructed to call the office for concerns, questions, or  problems.     The patient was instructed to follow up after EGD.      The patient was counseled regarding. Total time spent face to face was 30 minutes and 25 minutes was spent counseling.     LA Epps  [facility] Bariatrics and General Surgery

## 2021-10-21 LAB
A-TOCOPHEROL VIT E SERPL-MCNC: 11.1 MG/L (ref 5.9–19.4)
GAMMA TOCOPHEROL SERPL-MCNC: 1.9 MG/L (ref 0.7–4.9)
VIT B1 BLD-SCNC: 117.5 NMOL/L (ref 66.5–200)

## 2021-10-22 LAB
Lab: NORMAL
METHYLMALONATE SERPL-SCNC: 112 NMOL/L (ref 0–378)
VIT A SERPL-MCNC: 42.8 UG/DL (ref 18.9–57.3)

## 2021-10-23 LAB — ZINC SERPL-MCNC: 78 UG/DL (ref 44–115)

## 2021-10-28 ENCOUNTER — PREP FOR SURGERY (OUTPATIENT)
Dept: OTHER | Facility: HOSPITAL | Age: 31
End: 2021-10-28

## 2021-10-28 DIAGNOSIS — E66.9 OBESITY, CLASS II, BMI 35-39.9: Primary | ICD-10-CM

## 2021-10-28 DIAGNOSIS — K21.9 GASTROESOPHAGEAL REFLUX DISEASE, UNSPECIFIED WHETHER ESOPHAGITIS PRESENT: ICD-10-CM

## 2021-10-28 RX ORDER — SODIUM CHLORIDE 9 MG/ML
30 INJECTION, SOLUTION INTRAVENOUS CONTINUOUS PRN
Status: CANCELLED | OUTPATIENT
Start: 2021-10-28

## 2021-10-29 ENCOUNTER — OFFICE VISIT (OUTPATIENT)
Dept: PSYCHIATRY | Facility: CLINIC | Age: 31
End: 2021-10-29

## 2021-10-29 DIAGNOSIS — F41.1 GENERALIZED ANXIETY DISORDER: Chronic | ICD-10-CM

## 2021-10-29 DIAGNOSIS — F32.A MODERATELY SEVERE DEPRESSION: Primary | Chronic | ICD-10-CM

## 2021-10-29 PROBLEM — F41.9 ANXIETY: Status: RESOLVED | Noted: 2019-01-22 | Resolved: 2021-10-29

## 2021-10-29 PROCEDURE — 99214 OFFICE O/P EST MOD 30 MIN: CPT

## 2021-10-29 RX ORDER — FLUOXETINE 10 MG/1
10 CAPSULE ORAL DAILY
Qty: 30 CAPSULE | Refills: 2 | Status: SHIPPED | OUTPATIENT
Start: 2021-10-29 | End: 2021-12-21 | Stop reason: ALTCHOICE

## 2021-10-29 NOTE — PROGRESS NOTES
Subjective   Gwen Barnes is a 31 y.o. female who presents today for initial evaluation in 2 years.    Chief Complaint:  Depression    History of Present Illness:  -Pt reports concern for dep that has persisted since her father's sudden death in June 2020 from an MI. She says he was her best friend and feels her family is falling apart with him gone.  -Previously saw Dr. Fraga over 2 years ago prior to weight loss surgery.  -Also admits to anxiety as a concern.  -Never been on psychiatric meds in the past, but reports likely undiagnosed episodes of depression.  -Reports a 60lb wt gain since her father's passing, which was after she lost over 100lbs post weight loss surgery in 2019.  -C/o difficulty staying asleep, and says she stays in bed or on the couch for long periods of time.  -Feels she is self-sabatoging her wt loss progress and her relationship due to depression.  -Doesn't mind med possibly decreasing sex drive since she has been using sex to escape from her emotions.  -C/o significant memory issues and concentration problems.  SI/HI: No  AVH: No  SA: No  Psychiatric Hospitalizations: No  Possibly Manic Symptoms on MDQ: 5    The following portions of the patient's history were reviewed and updated as appropriate: allergies, current medications, past family history, past medical history, past social history, past surgical history and problem list.    PAST PSYCHIATRIC HISTORY  Axis I  None  Axis II  None    PAST OUTPATIENT TREATMENT  Diagnosis treated:  None  Treatment Type:  None  Prior Psychiatric Medications:  None  Support Groups:  None  Sequelae Of Mental Disorder:  social isolation, family disruption, emotional distress    Interval History  Deteriorated since father's passing.    Side Effects  N/A    Past Medical History:  Past Medical History:   Diagnosis Date   • Anxiety    • Depression    • Fatigue    • GERD (gastroesophageal reflux disease)    • Heartburn    • Morbid obesity (HCC)    • PCOS  (polycystic ovarian syndrome)      Social History:  Social History     Socioeconomic History   • Marital status: Single   Tobacco Use   • Smoking status: Never Smoker   • Smokeless tobacco: Never Used   Substance and Sexual Activity   • Alcohol use: Yes     Comment: social   • Drug use: No   • Sexual activity: Defer     Family History:  Family History   Problem Relation Age of Onset   • Obesity Sister    • Diabetes type II Maternal Grandmother    • Alzheimer's disease Maternal Grandmother    • Lung cancer Maternal Grandfather    • Lung cancer Paternal Grandmother      Past Surgical History:  Past Surgical History:   Procedure Laterality Date   • CHOLECYSTECTOMY N/A 11/5/2019    Procedure: CHOLECYSTECTOMY LAPAROSCOPIC;  Surgeon: Luna Keyes MD;  Location: [facility] MAIN OR;  Service: General   • DILATATION AND CURETTAGE  2014 & 2019   • ENDOSCOPY     • GASTRIC SLEEVE LAPAROSCOPIC N/A 8/6/2019    Procedure: laparoscopic sleeve gastrectomy;  Surgeon: Luna Keyes MD;  Location: [facility] OR;  Service: Bariatric   • TONSILLECTOMY AND ADENOIDECTOMY  2002     Problem List:  Patient Active Problem List   Diagnosis   • Obesity, Class III, BMI 40-49.9 (morbid obesity) ([facility])   • Body mass index 45.0-49.9, adult ([facility])   • Encounter for other preprocedural examination   • Heartburn   • Malaise and fatigue   • Polycystic ovaries   • Morbid (severe) obesity due to excess calories ([facility])   • Gastroesophageal reflux disease   • Moderately severe depression ([facility])   • Generalized anxiety disorder     Allergy:   No Known Allergies     Discontinued Medications:  There are no discontinued medications.    Current Medications:   Current Outpatient Medications   Medication Sig Dispense Refill   • famotidine (Pepcid) 40 MG tablet Take 1 tablet by mouth At Night As Needed for Heartburn. (Patient taking differently: Take 40 mg by mouth At Night As Needed for Heartburn. STOP 2 DAYS PREOP) 90 tablet 1   • FLUoxetine (PROzac) 10 MG capsule Take 1  capsule by mouth Daily. (Patient taking differently: Take 10 mg by mouth Daily. TAKE PREOP) 30 capsule 2   • multivitamin with minerals tablet tablet Take 1 tablet by mouth Daily. DONT TAKE PREOP     • omeprazole (priLOSEC) 40 MG capsule Take 1 capsule by mouth Daily. (Patient taking differently: Take 40 mg by mouth Daily. STOP 2 DAYS PRIOR) 30 capsule 6   • ondansetron ODT (Zofran ODT) 8 MG disintegrating tablet Place 1 tablet on the tongue Every 8 (Eight) Hours As Needed for Nausea or Vomiting. 30 tablet 5   • phentermine 15 MG capsule Take 1 capsule by mouth Every Morning. 30 capsule 0   • sucralfate (Carafate) 1 g tablet Take 1 tablet by mouth 4 (Four) Times a Day. (Patient taking differently: Take 1 g by mouth 4 (Four) Times a Day. STOP 2 DAY PREOP) 120 tablet 3   • ANGEL 3-0.03 MG per tablet Take 1 tablet by mouth Every Night.  12   • topiramate (Topamax) 50 MG tablet Take 1 tablet by mouth 2 (Two) Times a Day. (Patient taking differently: Take 50 mg by mouth 2 (Two) Times a Day. HOLD  2 DAYS PREOP) 60 tablet 3     No current facility-administered medications for this visit.     Psychological ROS: positive for - anxiety, concentration difficulties, depression, irritability, memory difficulties, mood swings and sleep disturbances  negative for - decreased libido, disorientation, hallucinations, hostility or suicidal ideation    Physical Exam:   not currently breastfeeding.    Mental Status Exam:   Orientation:  To person, Place, Time and Situation  Memory: Short-term memory deficits and remote memory Intact  Mood/Affect: Depressed and Restricted  Suicidal Ideations: None  Homicidal Ideations:  None  Hallucinations: None  Delusions:  None  Obsessions: None  Behavior and Psychomotor Activity: Appropriate  Speech:  Minimal  Thought Process: Goal directed and Circum  Associations: Intact  Thought Content: Normal  Language: name objects and repeat phrases  Concentration and computation: Poor  Attention Span:  Poor  Fund of Knowledge: Fair  Reliability: good  Insight into mental health: Poor  Judgement: Fair  Impulse Control:  Fair  Hygiene: fair  Cooperation:  Cooperative  Eye Contact:  Downcast  Physical/Medical Issues:  Yes obesity, GERD, migraines.     PHQ-9 Depression Screening  Little interest or pleasure in doing things? 3   Feeling down, depressed, or hopeless? 3   Trouble falling or staying asleep, or sleeping too much? 3   Feeling tired or having little energy? 3   Poor appetite or overeating? 3   Feeling bad about yourself - or that you are a failure or have let yourself or your family down? 3   Trouble concentrating on things, such as reading the newspaper or watching television? 1   Moving or speaking so slowly that other people could have noticed? Or the opposite - being so fidgety or restless that you have been moving around a lot more than usual? 0   Thoughts that you would be better off dead, or of hurting yourself in some way? 0   PHQ-9 Total Score 19   If you checked off any problems, how difficult have these problems made it for you to do your work, take care of things at home, or get along with other people? Extremely dIfficult   PHQ-9: 19; moderately-severe depression.  NAPOLEON-7: 15; severe anxiety.  MDQ: Negative; 5 possibly manic symptoms. (10/29/21)    Never smoker    I advised Gwen of the risks of tobacco use.     Labs: Reviewed.  Sodium: 139 (10/18/21)    Assessment/Plan   Diagnoses and all orders for this visit:    1. Moderately severe depression (HCC) (Primary)  -     FLUoxetine (PROzac) 10 MG capsule; Take 1 capsule by mouth Daily. (Patient taking differently: Take 10 mg by mouth Daily. TAKE PREOP)  Dispense: 30 capsule; Refill: 2    2. Generalized anxiety disorder  -     FLUoxetine (PROzac) 10 MG capsule; Take 1 capsule by mouth Daily. (Patient taking differently: Take 10 mg by mouth Daily. TAKE PREOP)  Dispense: 30 capsule; Refill: 2    PHQ-9: 19; moderately-severe depression.  NAPOLEON-7: 15;  severe anxiety.  MDQ: Negative; 5 possibly manic symptoms. (10/29/21)  -Start 10mg Prozac daily for depression and anxiety. Told to call office if concern for side effects.  -Counseling emphasized as important to maximize tx, and sheet of local providers was given.  -Pt told to go to the ED or psych hospital if SI develop and a plan is formed or actions are anticipated.  -Consider bupropion for additional depression control and assistance with overeating.  -F/U in 6w, determine if counseling acquired and consider further titration of Prozac.    Visit Diagnoses:    ICD-10-CM ICD-9-CM   1. Moderately severe depression (HCC)  F32.2 311   2. Generalized anxiety disorder  F41.1 300.02     TREATMENT PLAN/GOALS: In the short-term, the patient is to continue supportive psychotherapy efforts and medications as indicated. Treatment and medication options were discussed during today's visit. Long-term the goal will be to control symptoms so they do not negatively interfere with other aspects of the patient's life. Prognosis is optimistic with implementation of the current treatment plan. Patient ackowledged and verbally consented to the treatment plan and was educated on the importance of compliance with treatment and follow-up appointments.    MEDICATION ISSUES:  INSPECT reviewed 10/29/21, and is as expected. 15mg Phenteramine filled 9/15/21.  Discussed medication options and treatment plan of prescribed medication as well as the risks, benefits, and side effects including potential falls, possible impaired driving, and metabolic adversities among others. Patient counseled on a multimodal approach with healthy nutrition, healthy sleep, regular physical activity, social activity, counseling, and medication taking part in his/her psychiatric management. Patient is agreeable to the plan, and he/she agreed to call the office with any worsening of symptoms or onset of side effects. Patient is agreeable to call 911 or go to the  nearest ER should he/she begin having SI/HI or self-harming behavior.     Assisted patient in processing above session content; acknowledged and normalized patient’s thoughts, feelings, and concerns. Reviewed positive coping skills and behavior management in session with positive framing of thoughts. Allowed patient to freely discuss issues without interruption or judgment. Provided safe, confidential environment to facilitate the development of positive therapeutic relationship and encourage open and honest communication.    MEDS ORDERED DURING VISIT:  New Medications Ordered This Visit   Medications   • FLUoxetine (PROzac) 10 MG capsule     Sig: Take 1 capsule by mouth Daily.     Dispense:  30 capsule     Refill:  2       Return in about 6 weeks (around 12/10/2021) for Recheck.         This document has been electronically signed by Stone Rapp PA-C  October 31, 2021 11:58 EDT    EMR Dragon transcription disclaimer:  Some of this encounter note is an electronic transcription translation of spoken language to printed text. The electronic translation of spoken language may permit erroneous, or at times, nonsensical words or phrases to be inadvertently transcribed; Although I have reviewed the note for such errors some may still exist.

## 2021-11-02 ENCOUNTER — LAB (OUTPATIENT)
Dept: LAB | Facility: HOSPITAL | Age: 31
End: 2021-11-02

## 2021-11-02 DIAGNOSIS — K21.9 GASTROESOPHAGEAL REFLUX DISEASE, UNSPECIFIED WHETHER ESOPHAGITIS PRESENT: ICD-10-CM

## 2021-11-02 LAB
PHYTONADIONE SERPL-MCNC: 0.21 NG/ML (ref 0.1–2.2)
SARS-COV-2 ORF1AB RESP QL NAA+PROBE: NOT DETECTED

## 2021-11-02 PROCEDURE — C9803 HOPD COVID-19 SPEC COLLECT: HCPCS

## 2021-11-02 PROCEDURE — U0004 COV-19 TEST NON-CDC HGH THRU: HCPCS

## 2021-11-04 ENCOUNTER — ANESTHESIA EVENT (OUTPATIENT)
Dept: GASTROENTEROLOGY | Facility: HOSPITAL | Age: 31
End: 2021-11-04

## 2021-11-04 ENCOUNTER — HOSPITAL ENCOUNTER (OUTPATIENT)
Facility: HOSPITAL | Age: 31
Setting detail: HOSPITAL OUTPATIENT SURGERY
Discharge: HOME | End: 2021-11-04
Attending: SURGERY | Admitting: SURGERY

## 2021-11-04 ENCOUNTER — ANESTHESIA (OUTPATIENT)
Dept: GASTROENTEROLOGY | Facility: HOSPITAL | Age: 31
End: 2021-11-04

## 2021-11-04 VITALS
OXYGEN SATURATION: 97 % | BODY MASS INDEX: 38.86 KG/M2 | WEIGHT: 262.35 LBS | SYSTOLIC BLOOD PRESSURE: 117 MMHG | HEART RATE: 61 BPM | DIASTOLIC BLOOD PRESSURE: 72 MMHG | RESPIRATION RATE: 13 BRPM | TEMPERATURE: 98.4 F | HEIGHT: 69 IN

## 2021-11-04 DIAGNOSIS — K21.9 GASTROESOPHAGEAL REFLUX DISEASE, UNSPECIFIED WHETHER ESOPHAGITIS PRESENT: ICD-10-CM

## 2021-11-04 PROCEDURE — 88305 TISSUE EXAM BY PATHOLOGIST: CPT | Performed by: SURGERY

## 2021-11-04 PROCEDURE — 25010000002 PROPOFOL 10 MG/ML EMULSION: Performed by: ANESTHESIOLOGY

## 2021-11-04 PROCEDURE — 43239 EGD BIOPSY SINGLE/MULTIPLE: CPT | Performed by: SURGERY

## 2021-11-04 RX ORDER — LORAZEPAM 2 MG/ML
1 INJECTION INTRAMUSCULAR
Status: CANCELLED | OUTPATIENT
Start: 2021-11-04 | End: 2021-11-14

## 2021-11-04 RX ORDER — LIDOCAINE HYDROCHLORIDE 10 MG/ML
INJECTION, SOLUTION EPIDURAL; INFILTRATION; INTRACAUDAL; PERINEURAL AS NEEDED
Status: DISCONTINUED | OUTPATIENT
Start: 2021-11-04 | End: 2021-11-04 | Stop reason: SURG

## 2021-11-04 RX ORDER — SODIUM CHLORIDE 9 MG/ML
30 INJECTION, SOLUTION INTRAVENOUS CONTINUOUS PRN
Status: DISCONTINUED | OUTPATIENT
Start: 2021-11-04 | End: 2021-11-04 | Stop reason: HOSPADM

## 2021-11-04 RX ORDER — MEPERIDINE HYDROCHLORIDE 25 MG/ML
12.5 INJECTION INTRAMUSCULAR; INTRAVENOUS; SUBCUTANEOUS
Status: CANCELLED | OUTPATIENT
Start: 2021-11-04 | End: 2021-11-05

## 2021-11-04 RX ORDER — HYDROMORPHONE HCL 110MG/55ML
0.5 PATIENT CONTROLLED ANALGESIA SYRINGE INTRAVENOUS
Status: CANCELLED | OUTPATIENT
Start: 2021-11-04 | End: 2021-11-14

## 2021-11-04 RX ORDER — DIPHENHYDRAMINE HYDROCHLORIDE 50 MG/ML
12.5 INJECTION INTRAMUSCULAR; INTRAVENOUS
Status: CANCELLED | OUTPATIENT
Start: 2021-11-04

## 2021-11-04 RX ORDER — PROPOFOL 10 MG/ML
VIAL (ML) INTRAVENOUS AS NEEDED
Status: DISCONTINUED | OUTPATIENT
Start: 2021-11-04 | End: 2021-11-04 | Stop reason: SURG

## 2021-11-04 RX ORDER — NALBUPHINE HCL 10 MG/ML
10 AMPUL (ML) INJECTION EVERY 4 HOURS PRN
Status: CANCELLED | OUTPATIENT
Start: 2021-11-04

## 2021-11-04 RX ORDER — NALBUPHINE HCL 10 MG/ML
2 AMPUL (ML) INJECTION EVERY 4 HOURS PRN
Status: CANCELLED | OUTPATIENT
Start: 2021-11-04

## 2021-11-04 RX ORDER — ONDANSETRON 2 MG/ML
4 INJECTION INTRAMUSCULAR; INTRAVENOUS ONCE AS NEEDED
Status: CANCELLED | OUTPATIENT
Start: 2021-11-04

## 2021-11-04 RX ORDER — NALOXONE HCL 0.4 MG/ML
0.4 VIAL (ML) INJECTION AS NEEDED
Status: CANCELLED | OUTPATIENT
Start: 2021-11-04

## 2021-11-04 RX ORDER — ALBUTEROL SULFATE 2.5 MG/3ML
2.5 SOLUTION RESPIRATORY (INHALATION) ONCE AS NEEDED
Status: CANCELLED | OUTPATIENT
Start: 2021-11-04

## 2021-11-04 RX ORDER — HYDROMORPHONE HCL 110MG/55ML
0.5 PATIENT CONTROLLED ANALGESIA SYRINGE INTRAVENOUS
Status: CANCELLED | OUTPATIENT
Start: 2021-11-04

## 2021-11-04 RX ORDER — FLUMAZENIL 0.1 MG/ML
0.2 INJECTION INTRAVENOUS AS NEEDED
Status: CANCELLED | OUTPATIENT
Start: 2021-11-04

## 2021-11-04 RX ORDER — MIDAZOLAM HYDROCHLORIDE 1 MG/ML
1 INJECTION INTRAMUSCULAR; INTRAVENOUS
Status: CANCELLED | OUTPATIENT
Start: 2021-11-04

## 2021-11-04 RX ADMIN — PROPOFOL 550 MG: 10 INJECTION, EMULSION INTRAVENOUS at 13:27

## 2021-11-04 RX ADMIN — SODIUM CHLORIDE 30 ML/HR: 9 INJECTION, SOLUTION INTRAVENOUS at 12:49

## 2021-11-04 RX ADMIN — LIDOCAINE HYDROCHLORIDE 100 MG: 10 INJECTION, SOLUTION EPIDURAL; INFILTRATION; INTRACAUDAL; PERINEURAL at 13:27

## 2021-11-04 NOTE — ANESTHESIA POSTPROCEDURE EVALUATION
Patient: Gwen Barnes    Procedure Summary     Date: 11/04/21 Room / Location: [facility] ENDOSCOPY 1 / [facility] ENDOSCOPY    Anesthesia Start: 1325 Anesthesia Stop: 1345    Procedure: ESOPHAGOGASTRODUODENOSCOPY with biopsy x 2 areas (N/A ) Diagnosis:       Gastroesophageal reflux disease, unspecified whether esophagitis present      (Gastroesophageal reflux disease, unspecified whether esophagitis present [K21.9])    Surgeons: Luna Keyes MD Provider: Kenrick Hall MD    Anesthesia Type: general ASA Status: 3          Anesthesia Type: general    Vitals  Vitals Value Taken Time   /72 11/04/21 1354   Temp     Pulse 61 11/04/21 1354   Resp 13 11/04/21 1354   SpO2 97 % 11/04/21 1354           Post Anesthesia Care and Evaluation    Patient location during evaluation: PACU  Patient participation: complete - patient participated  Level of consciousness: awake  Pain scale: See nurse's notes for pain score.  Pain management: adequate  Airway patency: patent  Anesthetic complications: No anesthetic complications  PONV Status: none  Cardiovascular status: acceptable  Respiratory status: acceptable  Hydration status: acceptable    Comments: Patient seen and examined postoperatively; vital signs stable; SpO2 greater than or equal to 90%; cardiopulmonary status stable; nausea/vomiting adequately controlled; pain adequately controlled; no apparent anesthesia complications; patient discharged from anesthesia care when discharge criteria were met

## 2021-11-04 NOTE — ANESTHESIA PREPROCEDURE EVALUATION
Anesthesia Evaluation     Patient summary reviewed and Nursing notes reviewed   NPO Solid Status: > 8 hours  NPO Liquid Status: > 8 hours           Airway   Mallampati: II  TM distance: >3 FB  Neck ROM: full  No difficulty expected  Dental - normal exam     Pulmonary - normal exam    breath sounds clear to auscultation  (+) sleep apnea,   Cardiovascular - negative cardio ROS and normal exam  Exercise tolerance: unable to assess    ECG reviewed  Rhythm: regular  Rate: normal        Neuro/Psych  (+) psychiatric history Anxiety,     GI/Hepatic/Renal/Endo    (+) morbid obesity, GERD,      Musculoskeletal (-) negative ROS    Abdominal  - normal exam   Substance History - negative use     OB/GYN negative ob/gyn ROS         Other - negative ROS                       Anesthesia Plan    ASA 3     general     intravenous induction     Anesthetic plan, all risks, benefits, and alternatives have been provided, discussed and informed consent has been obtained with: patient.

## 2021-11-08 DIAGNOSIS — K21.9 GASTROESOPHAGEAL REFLUX DISEASE, UNSPECIFIED WHETHER ESOPHAGITIS PRESENT: Primary | ICD-10-CM

## 2021-11-08 LAB
LAB AP CASE REPORT: NORMAL
LAB AP DIAGNOSIS COMMENT: NORMAL
PATH REPORT.FINAL DX SPEC: NORMAL
PATH REPORT.GROSS SPEC: NORMAL

## 2021-11-30 ENCOUNTER — TELEPHONE (OUTPATIENT)
Dept: BARIATRICS/WEIGHT MGMT | Facility: CLINIC | Age: 31
End: 2021-11-30

## 2021-11-30 NOTE — TELEPHONE ENCOUNTER
Gwen had an EGD due to GERD, Dr Keyes also wanted her to have a CT due to not being able to see the back side of her stomach with the EGD. She wanted to see about getting approval for bypass due to GERD. The EGD does show GERD and she has been on several meds, and it is still not controlled. We will work on everything sent to insurance for a revision to bypass.

## 2021-12-21 ENCOUNTER — OFFICE VISIT (OUTPATIENT)
Dept: PSYCHIATRY | Facility: CLINIC | Age: 31
End: 2021-12-21

## 2021-12-21 DIAGNOSIS — F32.A MODERATELY SEVERE DEPRESSION: Primary | Chronic | ICD-10-CM

## 2021-12-21 DIAGNOSIS — F41.1 GENERALIZED ANXIETY DISORDER: Chronic | ICD-10-CM

## 2021-12-21 PROCEDURE — 99214 OFFICE O/P EST MOD 30 MIN: CPT

## 2021-12-21 RX ORDER — ESCITALOPRAM OXALATE 10 MG/1
10 TABLET ORAL DAILY
Qty: 30 TABLET | Refills: 2 | Status: SHIPPED | OUTPATIENT
Start: 2021-12-21 | End: 2022-02-21

## 2021-12-22 ENCOUNTER — PREP FOR SURGERY (OUTPATIENT)
Dept: OTHER | Facility: HOSPITAL | Age: 31
End: 2021-12-22

## 2021-12-22 ENCOUNTER — TELEPHONE (OUTPATIENT)
Dept: BARIATRICS/WEIGHT MGMT | Facility: CLINIC | Age: 31
End: 2021-12-22

## 2021-12-22 DIAGNOSIS — E66.9 OBESITY, CLASS II, BMI 35-39.9: Primary | ICD-10-CM

## 2021-12-22 PROBLEM — E66.812 OBESITY, CLASS II, BMI 35-39.9: Status: ACTIVE | Noted: 2021-12-22

## 2021-12-22 PROBLEM — E66.01 MORBID OBESITY: Status: ACTIVE | Noted: 2021-12-22

## 2021-12-22 RX ORDER — ACETAMINOPHEN 160 MG/5ML
975 SOLUTION ORAL ONCE
Status: CANCELLED | OUTPATIENT
Start: 2021-12-22 | End: 2021-12-22

## 2021-12-22 RX ORDER — GABAPENTIN 250 MG/5ML
250 SOLUTION ORAL ONCE
Status: CANCELLED | OUTPATIENT
Start: 2021-12-22 | End: 2021-12-22

## 2021-12-22 RX ORDER — PROMETHAZINE HYDROCHLORIDE 12.5 MG/1
12.5 TABLET ORAL ONCE
Status: CANCELLED | OUTPATIENT
Start: 2021-12-22 | End: 2021-12-22

## 2021-12-22 RX ORDER — SODIUM CHLORIDE 0.9 % (FLUSH) 0.9 %
10 SYRINGE (ML) INJECTION EVERY 12 HOURS SCHEDULED
Status: CANCELLED | OUTPATIENT
Start: 2021-12-22

## 2021-12-22 RX ORDER — CEFAZOLIN SODIUM IN 0.9 % NACL 3 G/100 ML
3 INTRAVENOUS SOLUTION, PIGGYBACK (ML) INTRAVENOUS
Status: CANCELLED | OUTPATIENT
Start: 2021-12-22

## 2021-12-22 RX ORDER — SCOLOPAMINE TRANSDERMAL SYSTEM 1 MG/1
1 PATCH, EXTENDED RELEASE TRANSDERMAL ONCE
Status: CANCELLED | OUTPATIENT
Start: 2021-12-22 | End: 2021-12-22

## 2021-12-22 RX ORDER — PANTOPRAZOLE SODIUM 40 MG/10ML
40 INJECTION, POWDER, LYOPHILIZED, FOR SOLUTION INTRAVENOUS ONCE
Status: CANCELLED | OUTPATIENT
Start: 2021-12-22 | End: 2021-12-22

## 2021-12-22 RX ORDER — SODIUM CHLORIDE, SODIUM LACTATE, POTASSIUM CHLORIDE, CALCIUM CHLORIDE 600; 310; 30; 20 MG/100ML; MG/100ML; MG/100ML; MG/100ML
100 INJECTION, SOLUTION INTRAVENOUS CONTINUOUS
Status: CANCELLED | OUTPATIENT
Start: 2021-12-22

## 2021-12-22 RX ORDER — PROMETHAZINE HYDROCHLORIDE 25 MG/1
12.5 SUPPOSITORY RECTAL ONCE
Status: CANCELLED | OUTPATIENT
Start: 2021-12-22

## 2021-12-22 RX ORDER — METOCLOPRAMIDE HYDROCHLORIDE 5 MG/ML
10 INJECTION INTRAMUSCULAR; INTRAVENOUS ONCE
Status: CANCELLED | OUTPATIENT
Start: 2021-12-22 | End: 2021-12-22

## 2021-12-22 RX ORDER — SODIUM CHLORIDE 0.9 % (FLUSH) 0.9 %
1-10 SYRINGE (ML) INJECTION AS NEEDED
Status: CANCELLED | OUTPATIENT
Start: 2021-12-22

## 2021-12-22 RX ORDER — CHLORHEXIDINE GLUCONATE 0.12 MG/ML
15 RINSE ORAL SEE ADMIN INSTRUCTIONS
Status: CANCELLED | OUTPATIENT
Start: 2021-12-22

## 2021-12-22 NOTE — TELEPHONE ENCOUNTER
Tried calling Gwen due to insurance approved surgery. Need to discuss surgery date with her. However, there was no answer and voicemail is full. I sent her an email asking her to call me.

## 2022-01-17 ENCOUNTER — CONSULT (OUTPATIENT)
Dept: BARIATRICS/WEIGHT MGMT | Facility: CLINIC | Age: 32
End: 2022-01-17

## 2022-01-17 VITALS
WEIGHT: 274.2 LBS | BODY MASS INDEX: 40.61 KG/M2 | HEIGHT: 69 IN | HEART RATE: 74 BPM | DIASTOLIC BLOOD PRESSURE: 83 MMHG | TEMPERATURE: 98.3 F | SYSTOLIC BLOOD PRESSURE: 116 MMHG | OXYGEN SATURATION: 97 % | RESPIRATION RATE: 16 BRPM

## 2022-01-17 DIAGNOSIS — K21.9 GASTROESOPHAGEAL REFLUX DISEASE, UNSPECIFIED WHETHER ESOPHAGITIS PRESENT: Primary | ICD-10-CM

## 2022-01-17 PROCEDURE — 99214 OFFICE O/P EST MOD 30 MIN: CPT | Performed by: SURGERY

## 2022-01-17 NOTE — PROGRESS NOTES
Bariatric Consult:  Referred by Gadiel Barrett MD    Gwen Barnes is here today for consult on No chief complaint on file.      History of Present Illness:     Gwen Barnes is a 31 y.o. female with morbid obesity with co-morbidities including GERD who presents for surgical consultation for the above procedure. Gwen has completed the initial intake visit and has been examined by our nurse practitioner, dietician, psychologist and underwent the extensive educational teaching process under the guidance of our bariatric coordinator and myself. Gwen also has seen the educational video HUSEYIN on the surgical procedure if available. Gwen attended today more educational teaching from our bariatric coordinator and myself. Gwen has had an extensive medical workup including a visit with their primary care physician, EKG, chest radiograph, blood work, EGD or UGI and possibly further testing. These have been reviewed by me and discussed with the patient. Gwen is now ready to proceed with surgery. Gwen presently denies nausea, vomiting, fever, chills, chest pain, shortness of air, melena, hematochezia, hemetemesis, dysuria, frequency, hematuria, jaundice or abdominal pain.     Wt Readings from Last 10 Encounters:   11/04/21 119 kg (262 lb 5.6 oz)   10/20/21 117 kg (257 lb)   09/15/21 114 kg (252 lb 6.4 oz)   08/12/21 113 kg (250 lb 3.2 oz)   08/02/21 115 kg (254 lb 6.4 oz)   06/25/21 109 kg (241 lb)   05/15/21 112 kg (246 lb 0.5 oz)   11/11/19 114 kg (252 lb 6.4 oz)   11/05/19 117 kg (258 lb 9.6 oz)   11/01/19 118 kg (260 lb 6.4 oz)       Her pre-op EGD shows esophagitis, history gastric sleeve. Was down to 190 in June 2020.       Past Medical History:   Diagnosis Date   • Anxiety    • Depression    • Fatigue    • GERD (gastroesophageal reflux disease)    • Heartburn    • Morbid obesity (HCC)    • PCOS (polycystic ovarian syndrome)        No diagnosis found.    Past Surgical History:   Procedure Laterality Date   •  CHOLECYSTECTOMY N/A 11/5/2019    Procedure: CHOLECYSTECTOMY LAPAROSCOPIC;  Surgeon: Luna Keyes MD;  Location: [facility] MAIN OR;  Service: General   • DILATATION AND CURETTAGE  2014 & 2019   • ENDOSCOPY     • ENDOSCOPY N/A 11/4/2021    Procedure: ESOPHAGOGASTRODUODENOSCOPY with biopsy x 2 areas;  Surgeon: Luna Keyes MD;  Location: [facility] ENDOSCOPY;  Service: General;  Laterality: N/A;  post op: esophagitis   • GASTRIC SLEEVE LAPAROSCOPIC N/A 8/6/2019    Procedure: laparoscopic sleeve gastrectomy;  Surgeon: Luna Keyes MD;  Location: [facility] MAIN OR;  Service: Bariatric   • TONSILLECTOMY AND ADENOIDECTOMY  2002       Patient Active Problem List   Diagnosis   • Obesity, Class III, BMI 40-49.9 (morbid obesity) ([facility])   • Body mass index 45.0-49.9, adult ([facility])   • Encounter for other preprocedural examination   • Heartburn   • Malaise and fatigue   • Polycystic ovaries   • Morbid (severe) obesity due to excess calories ([facility])   • Gastroesophageal reflux disease   • Moderately severe depression ([facility])   • Generalized anxiety disorder   • Morbid obesity ([facility])   • Obesity, Class II, BMI 35-39.9       No Known Allergies      Current Outpatient Medications:   •  escitalopram (Lexapro) 10 MG tablet, Take 1 tablet by mouth Daily., Disp: 30 tablet, Rfl: 2  •  famotidine (Pepcid) 40 MG tablet, Take 1 tablet by mouth At Night As Needed for Heartburn. (Patient taking differently: Take 40 mg by mouth At Night As Needed for Heartburn. STOP 2 DAYS PREOP), Disp: 90 tablet, Rfl: 1  •  multivitamin with minerals tablet tablet, Take 1 tablet by mouth Daily. DONT TAKE PREOP, Disp: , Rfl:   •  omeprazole (priLOSEC) 40 MG capsule, Take 1 capsule by mouth Daily. (Patient taking differently: Take 40 mg by mouth Daily. STOP 2 DAYS PRIOR), Disp: 30 capsule, Rfl: 6  •  ondansetron ODT (Zofran ODT) 8 MG disintegrating tablet, Place 1 tablet on the tongue Every 8 (Eight) Hours As Needed for Nausea or Vomiting., Disp: 30 tablet, Rfl: 5  •   phentermine 15 MG capsule, Take 1 capsule by mouth Every Morning., Disp: 30 capsule, Rfl: 0  •  sucralfate (Carafate) 1 g tablet, Take 1 tablet by mouth 4 (Four) Times a Day. (Patient taking differently: Take 1 g by mouth 4 (Four) Times a Day. STOP 2 DAY PREOP), Disp: 120 tablet, Rfl: 3  •  ANGEL 3-0.03 MG per tablet, Take 1 tablet by mouth Every Night., Disp: , Rfl: 12  •  topiramate (Topamax) 50 MG tablet, Take 1 tablet by mouth 2 (Two) Times a Day. (Patient taking differently: Take 50 mg by mouth 2 (Two) Times a Day. HOLD  2 DAYS PREOP), Disp: 60 tablet, Rfl: 3    Social History     Socioeconomic History   • Marital status: Single   Tobacco Use   • Smoking status: Never Smoker   • Smokeless tobacco: Never Used   Substance and Sexual Activity   • Alcohol use: Yes     Comment: social   • Drug use: No   • Sexual activity: Defer       Family History   Problem Relation Age of Onset   • Obesity Sister    • Diabetes type II Maternal Grandmother    • Alzheimer's disease Maternal Grandmother    • Lung cancer Maternal Grandfather    • Lung cancer Paternal Grandmother        Review of Systems:  Review of Systems   Constitutional: Negative.    HENT: Negative.    Eyes: Negative.    Respiratory: Negative.    Cardiovascular: Negative.    Gastrointestinal: Negative.    Endocrine: Negative.    Genitourinary: Negative.    Musculoskeletal: Negative.    Skin: Negative.    Allergic/Immunologic: Negative.    Neurological: Negative.    Hematological: Negative.    Psychiatric/Behavioral: Negative.    All other systems reviewed and are negative.        Physical Exam:    Vital Signs:      There is no height or weight on file to calculate BMI.                  Physical Exam  Vitals reviewed.   Constitutional:       Appearance: She is well-developed.   HENT:      Head: Normocephalic and atraumatic.   Eyes:      Conjunctiva/sclera: Conjunctivae normal.      Pupils: Pupils are equal, round, and reactive to light.   Cardiovascular:      Rate  and Rhythm: Normal rate and regular rhythm.      Heart sounds: Normal heart sounds.   Pulmonary:      Effort: Pulmonary effort is normal.      Breath sounds: Normal breath sounds.   Abdominal:      General: Bowel sounds are normal. There is no distension.      Palpations: Abdomen is soft. There is no mass.      Tenderness: There is no abdominal tenderness. There is no guarding or rebound.      Hernia: No hernia is present.   Musculoskeletal:         General: Normal range of motion.      Cervical back: Normal range of motion and neck supple.   Skin:     General: Skin is warm and dry.   Neurological:      Mental Status: She is alert and oriented to person, place, and time.           Assessment:    Gwen Barnes is a 31 y.o. year old female with medically complicated severe obesity with a BMI of There is no height or weight on file to calculate BMI. and multiple co-morbidities listed in the encounter diagnosis.    I think she is an appropriate candidate for this surgery, and is ready to proceed.  The patient has returned to the office for a surgical consultation and has requested to proceed with the Edwar-en-Y  gastric bypass.  I have had the opportunity to obtain the history, examine the patient and review the patient's chart.    The patient understands that surgery is a tool and that weight loss is not guaranteed but only seen in the context of appropriate use, regular follow up, exercise and making appropriate food choices.      I have personally discussed the potential complications of the laparoscopic gastric bypass with this patient.  The patient is well aware of the potential complications of the surgery that include but not limited to bleeding, infections, deep venous thrombosis, pulmonary embolism, pulmonary complications such as pneumonia, cardiac events, hernias, small bowel obstruction, damage to the spleen or other organs, bowel injury, disfiguring scars, failure to lose weight, need for additional surgery,  conversion to an open procedure and death.  I also discussed the risks that apply in particular to the gastric bypass such as the leaking of stomach and/or intestinal contents at the staple or suture line, the development of an intra-abdominal abscess,  strictures, ulcers, and vitamin/mineral deficiencies.  The patient was strongly advised to avoid smoking and the use of non-steroidal anti-inflammatory drugs such as ibuprofen, Motrin and Advil in the postoperative period and understands the increased risk of ulcer formation, perforation, death if they did not stop the use of these medications/substances.     The risks, benefits, potential complications and alternative therapies were discussed at great lengths as outlined in our extensive consent forms, online consent, and educational teaching processes.      The patient has confirmed participation in the program's extensive educational activities.      All questions and concerns were answered to the patient's satisfaction.  The patient now wishes to proceed with surgery.    The patient [default value] pre-operative insertion of an IVC filter.    The patient [default value] a postoperative course of anticoagulant therapy.    Plan/Discussion/Summary:        I instructed patient to start on a H2 blocker or proton pump inhibitor if not already on one of these medications.    I explained in detail the procedures that we perform.  All of these procedures have a chance to convert to open if any technical challenges or complications do occur.  Bariatric surgery is not cosmetic surgery but rather a tool to help a patient make a life-long commitment lifestyle change including diet, exercise, behavior changes, and taking supplemental vitamins and minerals.    Problems after surgery may require more operations to correct them.    The risks, benefits, alternatives, and potential complications of all of the procedures were explained in detail including, but not limited to death,  anesthesia and medication adverse effect, deep venous thrombosis, pulmonary embolism, trocar site/incisional hernia, wound infection, abdominal infection, bleeding, failure to lose weight, gain weight, a change in body image, metabolic complications with vitamin deficiences and anemia.    Weight loss expectations were discussed with the patient in detail. The weight loss operations most commonly performed are the sleeve gastrectomy and the Edwar-en-Y gastric bypass. These operations result in weight losses up to approximately 25-35% of initial body weight 12 to 24 months after surgery with the gastric bypass usually the higher percent of weight loss but depends on patient using the tool.    For the gastric bypass and loop duodenal switch (ANALIA-S) the risks include but not limited to the following early complications:  Anastomotic leak/peritonitis, Edwar/Alimentary/biliopancreatic limb obstruction, severe & minor wound infection/seroma, and nausea/vomiting.  Late complications can include but are not limited to malnutrition, vitamin deficiencies, frequent loose stools,  stomal stenosis, marginal ulcer, bowel obstruction, intussusception, internal, and incisional hernia.    Regarding the gastric sleeve, there is less long-term outcome data and higher risk of dysphagia and reflux compared to a gastric bypass, as well as risk of internal visceral/organ injury, splenectomy, bleeding, infection, leak (which could require further intervention possible conversion to Edwar-en-Y gastric bypass), stenosis and possibility of regaining weight.    Gwen was counseled regarding diagnostic results, instructions for management, risk factor reductions, prognosis, patient and family education, impressions, risks and benefits of treatment options and importance of compliance with treatment. Total face to face time of the encounter was over 45 minutes and over 30 minutes was spent counseling.     Eddie Report   As part of this patient's  treatment plan I am prescribing controlled substances. The patient has been made aware of appropriate use of such medications, including potential risk of somnolence, limited ability to drive and /or work safely, and potential for dependence or overdose. It has also been made clear that these medications are for use by this patient only, without concomitant use of alcohol or other substances unless prescribed.    Gwen has completed prescribing agreement detailing terms of continued prescribing of controlled substances, including monitoring KAYLEY reports, urine drug screening, and pill counts if necessary. Gwen is aware that inappropriate use will result in cessation of prescribing such medications.    KAYLEY report has been reviewed      History and physical exam exhibit continued safe and appropriate use of controlled substances.      Gwen understands the surgical procedures and the different surgical options that are available.  She understands the lifestyle changes that are required after surgery and has agreed to follow the guidelines outlined in the weight management program.  She also expressed understanding of the risks involved and had all of female questions answered and desires to proceed.      Luna Keyes MD  1/17/2022  Answers for HPI/ROS submitted by the patient on 1/14/2022  Please describe your symptoms.: To prepare me for surgery next month needed to help with acid reflux and heartburn after several medications failed.  Have you had these symptoms before?: Yes  How long have you been having these symptoms?: Greater than 2 weeks  What is the primary reason for your visit?: Other

## 2022-01-25 DIAGNOSIS — Z01.818 PRE-OP TESTING: Primary | ICD-10-CM

## 2022-02-01 ENCOUNTER — HOSPITAL ENCOUNTER (OUTPATIENT)
Dept: CARDIOLOGY | Facility: HOSPITAL | Age: 32
Discharge: HOME | End: 2022-02-01

## 2022-02-01 ENCOUNTER — LAB (OUTPATIENT)
Dept: LAB | Facility: HOSPITAL | Age: 32
End: 2022-02-01

## 2022-02-01 DIAGNOSIS — E66.9 OBESITY, CLASS II, BMI 35-39.9: ICD-10-CM

## 2022-02-01 DIAGNOSIS — Z01.818 PRE-OP TESTING: ICD-10-CM

## 2022-02-01 LAB
ABO GROUP BLD: NORMAL
ALBUMIN SERPL-MCNC: 3.9 G/DL (ref 3.5–5.2)
ALBUMIN/GLOB SERPL: 1.2 G/DL
ALP SERPL-CCNC: 79 U/L (ref 39–117)
ALT SERPL W P-5'-P-CCNC: 16 U/L (ref 1–33)
ANION GAP SERPL CALCULATED.3IONS-SCNC: 8 MMOL/L (ref 5–15)
AST SERPL-CCNC: 15 U/L (ref 1–32)
BILIRUB SERPL-MCNC: 0.2 MG/DL (ref 0–1.2)
BLD GP AB SCN SERPL QL: NEGATIVE
BUN SERPL-MCNC: 13 MG/DL (ref 6–20)
BUN/CREAT SERPL: 16.7 (ref 7–25)
CALCIUM SPEC-SCNC: 9 MG/DL (ref 8.6–10.5)
CHLORIDE SERPL-SCNC: 102 MMOL/L (ref 98–107)
CO2 SERPL-SCNC: 27 MMOL/L (ref 22–29)
CREAT SERPL-MCNC: 0.78 MG/DL (ref 0.57–1)
DEPRECATED RDW RBC AUTO: 39.9 FL (ref 37–54)
ERYTHROCYTE [DISTWIDTH] IN BLOOD BY AUTOMATED COUNT: 13.1 % (ref 12.3–15.4)
GFR SERPL CREATININE-BSD FRML MDRD: 87 ML/MIN/1.73
GLOBULIN UR ELPH-MCNC: 3.2 GM/DL
GLUCOSE SERPL-MCNC: 129 MG/DL (ref 65–99)
HCT VFR BLD AUTO: 38.4 % (ref 34–46.6)
HGB BLD-MCNC: 12.6 G/DL (ref 12–15.9)
MCH RBC QN AUTO: 28.2 PG (ref 26.6–33)
MCHC RBC AUTO-ENTMCNC: 32.8 G/DL (ref 31.5–35.7)
MCV RBC AUTO: 85.9 FL (ref 79–97)
PLATELET # BLD AUTO: 409 10*3/MM3 (ref 140–450)
PMV BLD AUTO: 9.8 FL (ref 6–12)
POTASSIUM SERPL-SCNC: 3.9 MMOL/L (ref 3.5–5.2)
PROT SERPL-MCNC: 7.1 G/DL (ref 6–8.5)
RBC # BLD AUTO: 4.47 10*6/MM3 (ref 3.77–5.28)
RH BLD: POSITIVE
SODIUM SERPL-SCNC: 137 MMOL/L (ref 136–145)
T&S EXPIRATION DATE: NORMAL
WBC NRBC COR # BLD: 7.08 10*3/MM3 (ref 3.4–10.8)

## 2022-02-01 PROCEDURE — 36415 COLL VENOUS BLD VENIPUNCTURE: CPT

## 2022-02-01 PROCEDURE — 86901 BLOOD TYPING SEROLOGIC RH(D): CPT

## 2022-02-01 PROCEDURE — 85027 COMPLETE CBC AUTOMATED: CPT

## 2022-02-01 PROCEDURE — 86900 BLOOD TYPING SEROLOGIC ABO: CPT

## 2022-02-01 PROCEDURE — 93005 ELECTROCARDIOGRAM TRACING: CPT | Performed by: SURGERY

## 2022-02-01 PROCEDURE — 93010 ELECTROCARDIOGRAM REPORT: CPT | Performed by: INTERNAL MEDICINE

## 2022-02-01 PROCEDURE — 80053 COMPREHEN METABOLIC PANEL: CPT

## 2022-02-01 PROCEDURE — 86850 RBC ANTIBODY SCREEN: CPT

## 2022-02-02 LAB — QT INTERVAL: 391 MS

## 2022-02-05 ENCOUNTER — LAB (OUTPATIENT)
Dept: LAB | Facility: HOSPITAL | Age: 32
End: 2022-02-05

## 2022-02-05 DIAGNOSIS — E66.9 OBESITY, CLASS II, BMI 35-39.9: ICD-10-CM

## 2022-02-05 LAB — SARS-COV-2 ORF1AB RESP QL NAA+PROBE: DETECTED

## 2022-02-05 PROCEDURE — C9803 HOPD COVID-19 SPEC COLLECT: HCPCS

## 2022-02-05 PROCEDURE — U0004 COV-19 TEST NON-CDC HGH THRU: HCPCS

## 2022-02-09 ENCOUNTER — TELEPHONE (OUTPATIENT)
Dept: BARIATRICS/WEIGHT MGMT | Facility: CLINIC | Age: 32
End: 2022-02-09

## 2022-02-09 NOTE — TELEPHONE ENCOUNTER
Gwen called back to discuss her surgery, we had to move her to 3/9 due to her covid testing, She stated her mom was getting remarried on 3/9 and there is no way they can change the date, I told her we can move her to 3/22 as that is the 1st avail we have, but is we get a cancellation, we could move her up. She knows she is to eat mindful and maintain her current weight loss.

## 2022-02-09 NOTE — TELEPHONE ENCOUNTER
Gwen called and LMOVM she had questions about her surgery since it had be rescheduled do to covid. I called her back to talk to her and LMOVM for her to call back

## 2022-02-15 ENCOUNTER — TELEPHONE (OUTPATIENT)
Dept: BARIATRICS/WEIGHT MGMT | Facility: CLINIC | Age: 32
End: 2022-02-15

## 2022-02-15 NOTE — TELEPHONE ENCOUNTER
Gwen does not want to move up sx due to a wedding on 3.9.2022. If we have anything before 3.22. but after 3.9, she would be interested.

## 2022-02-15 NOTE — TELEPHONE ENCOUNTER
ANNMARIE. Currently scheduled for SX on 3.22.22 but we have an opening on 3.1.2022 if she wants to move up her surgery.

## 2022-02-21 ENCOUNTER — OFFICE VISIT (OUTPATIENT)
Dept: PSYCHIATRY | Facility: CLINIC | Age: 32
End: 2022-02-21

## 2022-02-21 VITALS
SYSTOLIC BLOOD PRESSURE: 117 MMHG | DIASTOLIC BLOOD PRESSURE: 77 MMHG | OXYGEN SATURATION: 95 % | WEIGHT: 279.2 LBS | BODY MASS INDEX: 42.31 KG/M2 | HEIGHT: 68 IN | HEART RATE: 82 BPM

## 2022-02-21 DIAGNOSIS — F33.41 RECURRENT MAJOR DEPRESSIVE DISORDER, IN PARTIAL REMISSION: Chronic | ICD-10-CM

## 2022-02-21 DIAGNOSIS — F41.1 GENERALIZED ANXIETY DISORDER: Primary | Chronic | ICD-10-CM

## 2022-02-21 PROCEDURE — 99214 OFFICE O/P EST MOD 30 MIN: CPT

## 2022-02-21 RX ORDER — BUSPIRONE HYDROCHLORIDE 10 MG/1
10 TABLET ORAL 2 TIMES DAILY
Qty: 60 TABLET | Refills: 2 | Status: SHIPPED | OUTPATIENT
Start: 2022-02-21 | End: 2022-04-28 | Stop reason: SDUPTHER

## 2022-02-21 RX ORDER — ESCITALOPRAM OXALATE 20 MG/1
20 TABLET ORAL DAILY
Qty: 90 TABLET | Refills: 0 | Status: SHIPPED | OUTPATIENT
Start: 2022-02-21 | End: 2022-04-28

## 2022-02-21 NOTE — PROGRESS NOTES
Subjective   Gwen Barnes is a 31 y.o. female who presents today for f/u med management.    Chief Complaint:  Anxiety    History of Present Illness:  10/29/21: Pt reports concern for dep that has persisted since her father's sudden death in June 2020 from an MI. She says he was her best friend and her family is falling apart with him gone.  -Previously saw Dr. Fraga over 2 years ago prior to weight loss surgery.  -Also admits to anxiety as a concern.  -Never been on psychiatric meds in the past, but reports likely undiagnosed episodes of depression.  -Reports a 60lb wt gain since her father's passing, which was after she lost over 100lbs post weight loss surgery in 2019.  -C/o difficulty staying asleep, and says she stays in bed or on the couch for long periods of time.  -Feels she is self-sabatoging her wt loss progress and her relationship due to depression.  -Doesn't mind med possibly decreasing sex drive since she has been using sex to escape from her emotions.  -C/o significant memory issues and concentration problems.  SI/HI: No  AVH: No  SA: No  Psychiatric Hospitalizations: No  Possibly Manic Symptoms on MDQ: 5    12/21/21: Pt reports significant improvement in depression on fluoxetine, but says it gives her HA and sexual dysfunction. She stopped taking 2 days and the HA went away, but has since continued taking the med. C/o decreased sexual sensation and libido on fluoxetine.  -Despite feeling improved she reports waking up sad one day and cried the whole day. Also states there were times when she felt like she wanted to cry and couldn't.  -Has acquired therapy, and is going every Tuesday.  -Continues to deny AVH or SI/HI.    2/21/22: Pt presents w/ continued dep/anx concern. Says she remains irritable at time, and feels her anxiety may be even worse than before. Has also found difficulty with low motivation to do things. Denies AVH or SI/HI.    The following portions of the patient's history were reviewed  and updated as appropriate: allergies, current medications, past family history, past medical history, past social history, past surgical history and problem list.    PAST PSYCHIATRIC HISTORY  Diagnosis treated:  None  Treatment Type:  None  Prior Psychiatric Medications:  Prozac- worsening migraines HA and sexual dysfunction.  *none prior to initial eval.  Support Groups:  None  Sequelae Of Mental Disorder:  social isolation, family disruption, emotional distress    Interval History  Worsened anxiety, and no noticeable depression benefit.    Side Effects  Denied    Past Medical History:  Past Medical History:   Diagnosis Date   • Anxiety    • Depression    • Fatigue    • GERD (gastroesophageal reflux disease)    • Heartburn    • Morbid obesity (HCC)    • PCOS (polycystic ovarian syndrome)      Social History:  Social History     Socioeconomic History   • Marital status: Single   Tobacco Use   • Smoking status: Never Smoker   • Smokeless tobacco: Never Used   Substance and Sexual Activity   • Alcohol use: Yes     Comment: social   • Drug use: No   • Sexual activity: Defer     Family History:  Family History   Problem Relation Age of Onset   • Obesity Sister    • Diabetes type II Maternal Grandmother    • Alzheimer's disease Maternal Grandmother    • Lung cancer Maternal Grandfather    • Lung cancer Paternal Grandmother      Past Surgical History:  Past Surgical History:   Procedure Laterality Date   • CHOLECYSTECTOMY N/A 11/5/2019    Procedure: CHOLECYSTECTOMY LAPAROSCOPIC;  Surgeon: Luna Keyes MD;  Location: [facility] MAIN OR;  Service: General   • DILATATION AND CURETTAGE  2014 & 2019   • ENDOSCOPY     • ENDOSCOPY N/A 11/4/2021    Procedure: ESOPHAGOGASTRODUODENOSCOPY with biopsy x 2 areas;  Surgeon: Luna Keyes MD;  Location: [facility] ENDOSCOPY;  Service: General;  Laterality: N/A;  post op: esophagitis   • GASTRIC SLEEVE LAPAROSCOPIC N/A 8/6/2019    Procedure: laparoscopic sleeve gastrectomy;  Surgeon: Wali  MD Luna;  Location: [facility] MAIN OR;  Service: Bariatric   • TONSILLECTOMY AND ADENOIDECTOMY  2002     Problem List:  Patient Active Problem List   Diagnosis   • Obesity, Class III, BMI 40-49.9 (morbid obesity) ([facility])   • Body mass index 45.0-49.9, adult ([facility])   • Encounter for other preprocedural examination   • Heartburn   • Malaise and fatigue   • Polycystic ovaries   • Morbid (severe) obesity due to excess calories ([facility])   • Gastroesophageal reflux disease   • Moderately severe depression ([facility])   • Generalized anxiety disorder   • Morbid obesity ([facility])   • Obesity, Class II, BMI 35-39.9     Allergy:   No Known Allergies     Discontinued Medications:  Medications Discontinued During This Encounter   Medication Reason   • escitalopram (Lexapro) 10 MG tablet      Current Medications:   Current Outpatient Medications   Medication Sig Dispense Refill   • busPIRone (BUSPAR) 10 MG tablet Take 1 tablet by mouth 2 (Two) Times a Day. 60 tablet 2   • escitalopram (Lexapro) 20 MG tablet Take 1 tablet by mouth Daily. 90 tablet 0   • famotidine (Pepcid) 40 MG tablet Take 1 tablet by mouth At Night As Needed for Heartburn. (Patient taking differently: Take 40 mg by mouth At Night As Needed for Heartburn. STOP 2 DAYS PREOP) 90 tablet 1   • multivitamin with minerals tablet tablet Take 1 tablet by mouth Daily. Stop 2-1-22 for surgery     • omeprazole (priLOSEC) 40 MG capsule Take 1 capsule by mouth Daily. 30 capsule 1   • ondansetron ODT (Zofran ODT) 8 MG disintegrating tablet Place 1 tablet on the tongue Every 8 (Eight) Hours As Needed for Nausea or Vomiting. 30 tablet 5   • phentermine 15 MG capsule Take 1 capsule by mouth Every Morning. 30 capsule 0   • sucralfate (Carafate) 1 g tablet Take 1 tablet by mouth 4 (Four) Times a Day. 120 tablet 1   • ANGEL 3-0.03 MG per tablet Take 1 tablet by mouth Every Night.  12   • topiramate (Topamax) 50 MG tablet Take 1 tablet by mouth 2 (Two) Times a Day. (Patient taking differently: Take  "50 mg by mouth 2 (Two) Times a Day. Take dos) 60 tablet 3     No current facility-administered medications for this visit.     Psychological ROS: positive for - appetite change, fatigue, concentration difficulties, depression, memory difficulties, mood swings and sleep disturbances  negative for - anxiety, decreased libido, disorientation, hallucinations, hostility or suicidal ideation    Physical Exam:   Blood pressure 117/77, pulse 82, height 172.7 cm (68\"), weight 127 kg (279 lb 3.2 oz), SpO2 95 %, not currently breastfeeding.  BP: 117/77 (2/21/22)  HR: 82 (2/21/22)  Wt: 279.2lbs (2/21/22)    Mental Status Exam:   Orientation:  To person, Place, Time and Situation  Memory: Short-term memory deficits and remote memory Intact  Mood/Affect: Euthymic  Suicidal Ideations: None  Homicidal Ideations:  None  Hallucinations: None  Delusions:  None  Obsessions: None  Behavior and Psychomotor Activity: Appropriate  Speech:  Normal  Thought Process: Goal directed and Circum  Associations: Intact  Thought Content: Normal  Language: name objects and repeat phrases  Concentration and computation: Fair  Attention Span: Fair  Fund of Knowledge: Fair  Reliability: good  Insight into mental health: Poor  Judgement: Fair  Impulse Control:  Fair  Hygiene: fair  Cooperation:  Cooperative  Eye Contact:  Fair  Physical/Medical Issues:  Yes obesity, GERD, migraines.     MSE reviewed and accepted with no needed changes from previous visit.    PHQ-9 Depression Screening  Little interest or pleasure in doing things? 1   Feeling down, depressed, or hopeless? 1   Trouble falling or staying asleep, or sleeping too much? 2   Feeling tired or having little energy? 3   Poor appetite or overeating? 2   Feeling bad about yourself - or that you are a failure or have let yourself or your family down? 1   Trouble concentrating on things, such as reading the newspaper or watching television? 3   Moving or speaking so slowly that other people could have " noticed? Or the opposite - being so fidgety or restless that you have been moving around a lot more than usual? 0   Thoughts that you would be better off dead, or of hurting yourself in some way? 0   PHQ-9 Total Score 13   If you checked off any problems, how difficult have these problems made it for you to do your work, take care of things at home, or get along with other people? Very difficult   Feeling nervous, anxious or on edge: Nearly every day  Not being able to stop or control worrying: More than half the days  Worrying too much about different things: More than half the days  Trouble Relaxing: More than half the days  Being so restless that it is hard to sit still: More than half the days  Feeling afraid as if something awful might happen: Not at all  Becoming easily annoyed or irritable: Nearly every day  NAPOLEON 7 Total Score: 14  If you checked any problems, how difficult have these problems made it for you to do your work, take care of things at home, or get along with other people: Very difficult  PHQ-9: 13; moderate depression. Previously: 7...19 at initial eval.  NAPOLEON-7: 14; moderate anxiety. Previously 3...15 at initial eval.  MDQ: Negative; 5 possibly manic symptoms. (10/29/21)    Never smoker    I advised Gwen of the risks of tobacco use.     Labs: Reviewed.  Sodium: 139 (10/18/21)    Assessment/Plan   Diagnoses and all orders for this visit:    1. Generalized anxiety disorder (Primary)  -     escitalopram (Lexapro) 20 MG tablet; Take 1 tablet by mouth Daily.  Dispense: 90 tablet; Refill: 0  -     busPIRone (BUSPAR) 10 MG tablet; Take 1 tablet by mouth 2 (Two) Times a Day.  Dispense: 60 tablet; Refill: 2    2. Recurrent major depressive disorder, in partial remission (HCC)  -     escitalopram (Lexapro) 20 MG tablet; Take 1 tablet by mouth Daily.  Dispense: 90 tablet; Refill: 0    PHQ-9: 13; moderate depression. Previously: 7...19 at initial eval.  NAPOLEON-7: 14; moderate anxiety. Previously 3...15 at  initial eval.  MDQ: Negative; 5 possibly manic symptoms. (10/29/21)  -^ to 20mg lexapro daily for improved anx/dep benefit.  -Start 10mg buspirone BID for anxiety.  -Pt told to go to the ED or psych hospital if SI develops into a plan or actions are anticipated.  -Continue weekly therapy until medication regimen established and anx/dep in remission.  -F/U in 2m, determine benefit of 20mg Lexapro daily with 10mg Buspirone BID. See if counseling continued, monitor wt/BP. Consider [facility].    -R/o binge eating d/o in the future.    Visit Diagnoses:    ICD-10-CM ICD-9-CM   1. Generalized anxiety disorder  F41.1 300.02   2. Recurrent major depressive disorder, in partial remission (HCC)  F33.41 296.35     TREATMENT PLAN/GOALS: In the short-term, the patient is to continue supportive psychotherapy efforts and medications as indicated. Treatment and medication options were discussed during today's visit. Long-term the goal will be to control symptoms so they do not negatively interfere with other aspects of the patient's life. Prognosis is optimistic with implementation of the current treatment plan. Patient ackowledged and verbally consented to the treatment plan and was educated on the importance of compliance with treatment and follow-up appointments.    MEDICATION ISSUES:  INSPECT reviewed 2/21/22, and is as expected. 15mg Phenteramine filled 9/15/21.  Discussed medication options and treatment plan of prescribed medication as well as the risks, benefits, and side effects including potential falls, possible impaired driving, and metabolic adversities among others. Patient counseled on a multimodal approach with healthy nutrition, healthy sleep, regular physical activity, social activity, counseling, and medication taking part in his/her psychiatric management. Patient is agreeable to the plan, and he/she agreed to call the office with any worsening of symptoms or onset of side effects. Patient is agreeable to call  911 or go to the nearest ER should he/she begin having SI/HI or self-harming behavior.     Assisted patient in processing above session content; acknowledged and normalized patient’s thoughts, feelings, and concerns. Reviewed positive coping skills and behavior management in session with positive framing of thoughts. Allowed patient to freely discuss issues without interruption or judgment. Provided safe, confidential environment to facilitate the development of positive therapeutic relationship and encourage open and honest communication.    MEDS ORDERED DURING VISIT:  New Medications Ordered This Visit   Medications   • escitalopram (Lexapro) 20 MG tablet     Sig: Take 1 tablet by mouth Daily.     Dispense:  90 tablet     Refill:  0   • busPIRone (BUSPAR) 10 MG tablet     Sig: Take 1 tablet by mouth 2 (Two) Times a Day.     Dispense:  60 tablet     Refill:  2     Return in about 2 months (around 4/21/2022) for Recheck.     This document has been electronically signed by Stone Rapp PA-C  February 25, 2022 23:03 EST    EMR Dragon transcription disclaimer:  Part of this note may be an electronic transcription/translation of spoken language to printed text using the Dragon Dictation System.

## 2022-02-28 ENCOUNTER — TELEPHONE (OUTPATIENT)
Dept: BARIATRICS/WEIGHT MGMT | Facility: CLINIC | Age: 32
End: 2022-02-28

## 2022-03-09 DIAGNOSIS — Z01.818 PRE-OP TESTING: Primary | ICD-10-CM

## 2022-03-16 ENCOUNTER — LAB (OUTPATIENT)
Dept: LAB | Facility: HOSPITAL | Age: 32
End: 2022-03-16

## 2022-03-16 DIAGNOSIS — Z01.818 PRE-OP TESTING: ICD-10-CM

## 2022-03-16 LAB
ABO GROUP BLD: NORMAL
ALBUMIN SERPL-MCNC: 4.1 G/DL (ref 3.5–5.2)
ALBUMIN/GLOB SERPL: 1.3 G/DL
ALP SERPL-CCNC: 81 U/L (ref 39–117)
ALT SERPL W P-5'-P-CCNC: 19 U/L (ref 1–33)
ANION GAP SERPL CALCULATED.3IONS-SCNC: 8 MMOL/L (ref 5–15)
AST SERPL-CCNC: 16 U/L (ref 1–32)
BASOPHILS # BLD AUTO: 0.03 10*3/MM3 (ref 0–0.2)
BASOPHILS NFR BLD AUTO: 0.4 % (ref 0–1.5)
BILIRUB SERPL-MCNC: 0.2 MG/DL (ref 0–1.2)
BLD GP AB SCN SERPL QL: NEGATIVE
BUN SERPL-MCNC: 16 MG/DL (ref 6–20)
BUN/CREAT SERPL: 20 (ref 7–25)
CALCIUM SPEC-SCNC: 8.9 MG/DL (ref 8.6–10.5)
CHLORIDE SERPL-SCNC: 104 MMOL/L (ref 98–107)
CO2 SERPL-SCNC: 27 MMOL/L (ref 22–29)
CREAT SERPL-MCNC: 0.8 MG/DL (ref 0.57–1)
DEPRECATED RDW RBC AUTO: 40.5 FL (ref 37–54)
EGFRCR SERPLBLD CKD-EPI 2021: 101.8 ML/MIN/1.73
EOSINOPHIL # BLD AUTO: 0.04 10*3/MM3 (ref 0–0.4)
EOSINOPHIL NFR BLD AUTO: 0.5 % (ref 0.3–6.2)
ERYTHROCYTE [DISTWIDTH] IN BLOOD BY AUTOMATED COUNT: 13.1 % (ref 12.3–15.4)
GLOBULIN UR ELPH-MCNC: 3.2 GM/DL
GLUCOSE SERPL-MCNC: 78 MG/DL (ref 65–99)
HCT VFR BLD AUTO: 35.5 % (ref 34–46.6)
HGB BLD-MCNC: 11.5 G/DL (ref 12–15.9)
IMM GRANULOCYTES # BLD AUTO: 0.06 10*3/MM3 (ref 0–0.05)
IMM GRANULOCYTES NFR BLD AUTO: 0.8 % (ref 0–0.5)
LYMPHOCYTES # BLD AUTO: 2.69 10*3/MM3 (ref 0.7–3.1)
LYMPHOCYTES NFR BLD AUTO: 34.1 % (ref 19.6–45.3)
MCH RBC QN AUTO: 27.9 PG (ref 26.6–33)
MCHC RBC AUTO-ENTMCNC: 32.4 G/DL (ref 31.5–35.7)
MCV RBC AUTO: 86.2 FL (ref 79–97)
MONOCYTES # BLD AUTO: 0.59 10*3/MM3 (ref 0.1–0.9)
MONOCYTES NFR BLD AUTO: 7.5 % (ref 5–12)
NEUTROPHILS NFR BLD AUTO: 4.47 10*3/MM3 (ref 1.7–7)
NEUTROPHILS NFR BLD AUTO: 56.7 % (ref 42.7–76)
NRBC BLD AUTO-RTO: 0 /100 WBC (ref 0–0.2)
PLATELET # BLD AUTO: 388 10*3/MM3 (ref 140–450)
PMV BLD AUTO: 9.7 FL (ref 6–12)
POTASSIUM SERPL-SCNC: 4.4 MMOL/L (ref 3.5–5.2)
PROT SERPL-MCNC: 7.3 G/DL (ref 6–8.5)
RBC # BLD AUTO: 4.12 10*6/MM3 (ref 3.77–5.28)
RH BLD: POSITIVE
SODIUM SERPL-SCNC: 139 MMOL/L (ref 136–145)
T&S EXPIRATION DATE: NORMAL
WBC NRBC COR # BLD: 7.88 10*3/MM3 (ref 3.4–10.8)

## 2022-03-16 PROCEDURE — 86900 BLOOD TYPING SEROLOGIC ABO: CPT

## 2022-03-16 PROCEDURE — 36415 COLL VENOUS BLD VENIPUNCTURE: CPT

## 2022-03-16 PROCEDURE — 86850 RBC ANTIBODY SCREEN: CPT

## 2022-03-16 PROCEDURE — 86901 BLOOD TYPING SEROLOGIC RH(D): CPT

## 2022-03-16 PROCEDURE — 80053 COMPREHEN METABOLIC PANEL: CPT

## 2022-03-16 PROCEDURE — 85025 COMPLETE CBC W/AUTO DIFF WBC: CPT

## 2022-03-21 ENCOUNTER — ANESTHESIA EVENT (OUTPATIENT)
Dept: PERIOP | Facility: HOSPITAL | Age: 32
End: 2022-03-21

## 2022-03-22 ENCOUNTER — ANESTHESIA (OUTPATIENT)
Dept: PERIOP | Facility: HOSPITAL | Age: 32
End: 2022-03-22

## 2022-03-22 ENCOUNTER — HOSPITAL ENCOUNTER (INPATIENT)
Facility: HOSPITAL | Age: 32
LOS: 2 days | Discharge: HOME | End: 2022-03-24
Attending: SURGERY | Admitting: SURGERY

## 2022-03-22 DIAGNOSIS — E66.9 OBESITY, CLASS II, BMI 35-39.9: ICD-10-CM

## 2022-03-22 DIAGNOSIS — E66.01 OBESITY, CLASS III, BMI 40-49.9 (MORBID OBESITY): Primary | ICD-10-CM

## 2022-03-22 DIAGNOSIS — K21.9 GASTROESOPHAGEAL REFLUX DISEASE, UNSPECIFIED WHETHER ESOPHAGITIS PRESENT: ICD-10-CM

## 2022-03-22 LAB
ALBUMIN SERPL-MCNC: 3.9 G/DL (ref 3.5–5.2)
ALBUMIN/GLOB SERPL: 1.1 G/DL
ALP SERPL-CCNC: 93 U/L (ref 39–117)
ALT SERPL W P-5'-P-CCNC: 32 U/L (ref 1–33)
ANION GAP SERPL CALCULATED.3IONS-SCNC: 13 MMOL/L (ref 5–15)
AST SERPL-CCNC: 54 U/L (ref 1–32)
BASOPHILS # BLD AUTO: 0 10*3/MM3 (ref 0–0.2)
BASOPHILS NFR BLD AUTO: 0.1 % (ref 0–1.5)
BILIRUB SERPL-MCNC: 0.4 MG/DL (ref 0–1.2)
BUN SERPL-MCNC: 12 MG/DL (ref 6–20)
BUN/CREAT SERPL: 13.3 (ref 7–25)
CALCIUM SPEC-SCNC: 8.5 MG/DL (ref 8.6–10.5)
CHLORIDE SERPL-SCNC: 100 MMOL/L (ref 98–107)
CO2 SERPL-SCNC: 23 MMOL/L (ref 22–29)
CREAT SERPL-MCNC: 0.9 MG/DL (ref 0.57–1)
DEPRECATED RDW RBC AUTO: 38.5 FL (ref 37–54)
EGFRCR SERPLBLD CKD-EPI 2021: 88.4 ML/MIN/1.73
EOSINOPHIL # BLD AUTO: 0 10*3/MM3 (ref 0–0.4)
EOSINOPHIL NFR BLD AUTO: 0 % (ref 0.3–6.2)
ERYTHROCYTE [DISTWIDTH] IN BLOOD BY AUTOMATED COUNT: 13 % (ref 12.3–15.4)
GLOBULIN UR ELPH-MCNC: 3.5 GM/DL
GLUCOSE SERPL-MCNC: 140 MG/DL (ref 65–99)
HCG SERPL QL: NEGATIVE
HCT VFR BLD AUTO: 32.5 % (ref 34–46.6)
HGB BLD-MCNC: 11.6 G/DL (ref 12–15.9)
LYMPHOCYTES # BLD AUTO: 0.9 10*3/MM3 (ref 0.7–3.1)
LYMPHOCYTES NFR BLD AUTO: 7 % (ref 19.6–45.3)
MAGNESIUM SERPL-MCNC: 1.9 MG/DL (ref 1.6–2.6)
MCH RBC QN AUTO: 29.9 PG (ref 26.6–33)
MCHC RBC AUTO-ENTMCNC: 35.8 G/DL (ref 31.5–35.7)
MCV RBC AUTO: 83.4 FL (ref 79–97)
MONOCYTES # BLD AUTO: 0.3 10*3/MM3 (ref 0.1–0.9)
MONOCYTES NFR BLD AUTO: 2.4 % (ref 5–12)
NEUTROPHILS NFR BLD AUTO: 11.2 10*3/MM3 (ref 1.7–7)
NEUTROPHILS NFR BLD AUTO: 90.5 % (ref 42.7–76)
NRBC BLD AUTO-RTO: 0 /100 WBC (ref 0–0.2)
PHOSPHATE SERPL-MCNC: 3.5 MG/DL (ref 2.5–4.5)
PLATELET # BLD AUTO: 391 10*3/MM3 (ref 140–450)
PMV BLD AUTO: 7.4 FL (ref 6–12)
POTASSIUM SERPL-SCNC: 4.2 MMOL/L (ref 3.5–5.2)
PROT SERPL-MCNC: 7.4 G/DL (ref 6–8.5)
RBC # BLD AUTO: 3.89 10*6/MM3 (ref 3.77–5.28)
SODIUM SERPL-SCNC: 136 MMOL/L (ref 136–145)
WBC NRBC COR # BLD: 12.4 10*3/MM3 (ref 3.4–10.8)

## 2022-03-22 PROCEDURE — 43644 LAP GASTRIC BYPASS/ROUX-EN-Y: CPT | Performed by: SURGERY

## 2022-03-22 PROCEDURE — 25010000002 CEFAZOLIN PER 500 MG

## 2022-03-22 PROCEDURE — 25010000002 METOCLOPRAMIDE PER 10 MG: Performed by: SURGERY

## 2022-03-22 PROCEDURE — 25010000002 DEXAMETHASONE PER 1 MG: Performed by: NURSE ANESTHETIST, CERTIFIED REGISTERED

## 2022-03-22 PROCEDURE — 83735 ASSAY OF MAGNESIUM: CPT | Performed by: SURGERY

## 2022-03-22 PROCEDURE — S0260 H&P FOR SURGERY: HCPCS | Performed by: SURGERY

## 2022-03-22 PROCEDURE — 25010000002 FENTANYL CITRATE (PF) 50 MCG/ML SOLUTION

## 2022-03-22 PROCEDURE — C1889 IMPLANT/INSERT DEVICE, NOC: HCPCS | Performed by: SURGERY

## 2022-03-22 PROCEDURE — 25010000002 HYDROMORPHONE PER 4 MG: Performed by: SURGERY

## 2022-03-22 PROCEDURE — 0DB64Z3 EXCISION OF STOMACH, PERCUTANEOUS ENDOSCOPIC APPROACH, VERTICAL: ICD-10-PCS | Performed by: SURGERY

## 2022-03-22 PROCEDURE — 0DHA4UZ INSERTION OF FEEDING DEVICE INTO JEJUNUM, PERCUTANEOUS ENDOSCOPIC APPROACH: ICD-10-PCS | Performed by: SURGERY

## 2022-03-22 PROCEDURE — 84100 ASSAY OF PHOSPHORUS: CPT | Performed by: SURGERY

## 2022-03-22 PROCEDURE — 43644 LAP GASTRIC BYPASS/ROUX-EN-Y: CPT | Performed by: REGISTERED NURSE

## 2022-03-22 PROCEDURE — 25010000002 ONDANSETRON PER 1 MG: Performed by: NURSE ANESTHETIST, CERTIFIED REGISTERED

## 2022-03-22 PROCEDURE — 25010000002 PROPOFOL 10 MG/ML EMULSION

## 2022-03-22 PROCEDURE — 85025 COMPLETE CBC W/AUTO DIFF WBC: CPT | Performed by: SURGERY

## 2022-03-22 PROCEDURE — 80053 COMPREHEN METABOLIC PANEL: CPT | Performed by: SURGERY

## 2022-03-22 PROCEDURE — 25010000002 NEOSTIGMINE 5 MG/5ML SOLUTION: Performed by: NURSE ANESTHETIST, CERTIFIED REGISTERED

## 2022-03-22 PROCEDURE — 25010000002 ONDANSETRON PER 1 MG: Performed by: SURGERY

## 2022-03-22 PROCEDURE — 84703 CHORIONIC GONADOTROPIN ASSAY: CPT | Performed by: ANESTHESIOLOGY

## 2022-03-22 PROCEDURE — 0 BUPIVACAINE LIPOSOME 1.3 % SUSPENSION 10 ML VIAL: Performed by: SURGERY

## 2022-03-22 PROCEDURE — 8E0W4CZ ROBOTIC ASSISTED PROCEDURE OF TRUNK REGION, PERCUTANEOUS ENDOSCOPIC APPROACH: ICD-10-PCS | Performed by: SURGERY

## 2022-03-22 PROCEDURE — C9290 INJ, BUPIVACAINE LIPOSOME: HCPCS | Performed by: SURGERY

## 2022-03-22 PROCEDURE — 25010000002 MIDAZOLAM PER 1 MG

## 2022-03-22 DEVICE — ABSORBABLE WOUND CLOSURE DEVICE
Type: IMPLANTABLE DEVICE | Site: ABDOMEN | Status: FUNCTIONAL
Brand: SYNETURE

## 2022-03-22 DEVICE — SEALANT WND FIBRIN TISSEEL PREFIL/SYR/PRIMAFZ 4ML: Type: IMPLANTABLE DEVICE | Site: ABDOMEN | Status: FUNCTIONAL

## 2022-03-22 DEVICE — STAPLER 60 RELOAD WHITE
Type: IMPLANTABLE DEVICE | Site: ABDOMEN | Status: FUNCTIONAL
Brand: SUREFORM

## 2022-03-22 DEVICE — ABSORBABLE WOUND CLOSURE DEVICE
Type: IMPLANTABLE DEVICE | Site: ABDOMEN | Status: FUNCTIONAL
Brand: V-LOC 180

## 2022-03-22 DEVICE — STAPLER 60 RELOAD BLUE
Type: IMPLANTABLE DEVICE | Site: ABDOMEN | Status: FUNCTIONAL
Brand: SUREFORM

## 2022-03-22 RX ORDER — SODIUM CHLORIDE 0.9 % (FLUSH) 0.9 %
1-10 SYRINGE (ML) INJECTION AS NEEDED
Status: DISCONTINUED | OUTPATIENT
Start: 2022-03-22 | End: 2022-03-22 | Stop reason: HOSPADM

## 2022-03-22 RX ORDER — ROCURONIUM BROMIDE 10 MG/ML
INJECTION, SOLUTION INTRAVENOUS AS NEEDED
Status: DISCONTINUED | OUTPATIENT
Start: 2022-03-22 | End: 2022-03-22 | Stop reason: SURG

## 2022-03-22 RX ORDER — ONDANSETRON 2 MG/ML
INJECTION INTRAMUSCULAR; INTRAVENOUS AS NEEDED
Status: DISCONTINUED | OUTPATIENT
Start: 2022-03-22 | End: 2022-03-22 | Stop reason: SURG

## 2022-03-22 RX ORDER — ALBUTEROL SULFATE 2.5 MG/3ML
2.5 SOLUTION RESPIRATORY (INHALATION) EVERY 4 HOURS PRN
Status: DISCONTINUED | OUTPATIENT
Start: 2022-03-22 | End: 2022-03-24 | Stop reason: HOSPADM

## 2022-03-22 RX ORDER — GLYCOPYRROLATE 1 MG/5 ML
SYRINGE (ML) INTRAVENOUS AS NEEDED
Status: DISCONTINUED | OUTPATIENT
Start: 2022-03-22 | End: 2022-03-22 | Stop reason: SURG

## 2022-03-22 RX ORDER — HYDROMORPHONE HYDROCHLORIDE 2 MG/1
2 TABLET ORAL EVERY 4 HOURS PRN
Status: DISCONTINUED | OUTPATIENT
Start: 2022-03-22 | End: 2022-03-24 | Stop reason: HOSPADM

## 2022-03-22 RX ORDER — NEOSTIGMINE METHYLSULFATE 5 MG/5 ML
SYRINGE (ML) INTRAVENOUS AS NEEDED
Status: DISCONTINUED | OUTPATIENT
Start: 2022-03-22 | End: 2022-03-22 | Stop reason: SURG

## 2022-03-22 RX ORDER — ESCITALOPRAM OXALATE 10 MG/1
20 TABLET ORAL DAILY
Status: DISCONTINUED | OUTPATIENT
Start: 2022-03-22 | End: 2022-03-24 | Stop reason: HOSPADM

## 2022-03-22 RX ORDER — METOCLOPRAMIDE HYDROCHLORIDE 5 MG/ML
10 INJECTION INTRAMUSCULAR; INTRAVENOUS EVERY 6 HOURS PRN
Status: DISCONTINUED | OUTPATIENT
Start: 2022-03-22 | End: 2022-03-24 | Stop reason: HOSPADM

## 2022-03-22 RX ORDER — SCOLOPAMINE TRANSDERMAL SYSTEM 1 MG/1
1 PATCH, EXTENDED RELEASE TRANSDERMAL ONCE
Status: DISCONTINUED | OUTPATIENT
Start: 2022-03-22 | End: 2022-03-23

## 2022-03-22 RX ORDER — PANTOPRAZOLE SODIUM 40 MG/1
40 TABLET, DELAYED RELEASE ORAL EVERY MORNING
Refills: 1 | Status: DISCONTINUED | OUTPATIENT
Start: 2022-03-22 | End: 2022-03-24 | Stop reason: HOSPADM

## 2022-03-22 RX ORDER — MIDAZOLAM HYDROCHLORIDE 1 MG/ML
INJECTION INTRAMUSCULAR; INTRAVENOUS AS NEEDED
Status: DISCONTINUED | OUTPATIENT
Start: 2022-03-22 | End: 2022-03-22 | Stop reason: SURG

## 2022-03-22 RX ORDER — HYDROMORPHONE HCL 110MG/55ML
0.5 PATIENT CONTROLLED ANALGESIA SYRINGE INTRAVENOUS
Status: DISCONTINUED | OUTPATIENT
Start: 2022-03-22 | End: 2022-03-24 | Stop reason: HOSPADM

## 2022-03-22 RX ORDER — LIDOCAINE HYDROCHLORIDE 10 MG/ML
0.5 INJECTION, SOLUTION INFILTRATION; PERINEURAL ONCE AS NEEDED
Status: DISCONTINUED | OUTPATIENT
Start: 2022-03-22 | End: 2022-03-22 | Stop reason: HOSPADM

## 2022-03-22 RX ORDER — PROPOFOL 10 MG/ML
VIAL (ML) INTRAVENOUS AS NEEDED
Status: DISCONTINUED | OUTPATIENT
Start: 2022-03-22 | End: 2022-03-22 | Stop reason: SURG

## 2022-03-22 RX ORDER — ACETAMINOPHEN 500 MG
1000 TABLET ORAL EVERY 6 HOURS
Status: DISPENSED | OUTPATIENT
Start: 2022-03-22 | End: 2022-03-24

## 2022-03-22 RX ORDER — CHLORHEXIDINE GLUCONATE 0.12 MG/ML
15 RINSE ORAL SEE ADMIN INSTRUCTIONS
Status: COMPLETED | OUTPATIENT
Start: 2022-03-22 | End: 2022-03-22

## 2022-03-22 RX ORDER — HYDRALAZINE HYDROCHLORIDE 20 MG/ML
10 INJECTION INTRAMUSCULAR; INTRAVENOUS
Status: DISCONTINUED | OUTPATIENT
Start: 2022-03-22 | End: 2022-03-24 | Stop reason: HOSPADM

## 2022-03-22 RX ORDER — ONDANSETRON 2 MG/ML
8 INJECTION INTRAMUSCULAR; INTRAVENOUS EVERY 6 HOURS PRN
Status: DISCONTINUED | OUTPATIENT
Start: 2022-03-22 | End: 2022-03-24 | Stop reason: HOSPADM

## 2022-03-22 RX ORDER — PROMETHAZINE HYDROCHLORIDE 12.5 MG/1
12.5 TABLET ORAL ONCE
Status: DISCONTINUED | OUTPATIENT
Start: 2022-03-22 | End: 2022-03-22 | Stop reason: HOSPADM

## 2022-03-22 RX ORDER — SODIUM CHLORIDE 0.9 % (FLUSH) 0.9 %
10 SYRINGE (ML) INJECTION AS NEEDED
Status: DISCONTINUED | OUTPATIENT
Start: 2022-03-22 | End: 2022-03-22 | Stop reason: HOSPADM

## 2022-03-22 RX ORDER — PROMETHAZINE HYDROCHLORIDE 25 MG/1
12.5 SUPPOSITORY RECTAL ONCE
Status: DISCONTINUED | OUTPATIENT
Start: 2022-03-22 | End: 2022-03-22 | Stop reason: HOSPADM

## 2022-03-22 RX ORDER — SODIUM CHLORIDE, SODIUM LACTATE, POTASSIUM CHLORIDE, CALCIUM CHLORIDE 600; 310; 30; 20 MG/100ML; MG/100ML; MG/100ML; MG/100ML
1000 INJECTION, SOLUTION INTRAVENOUS CONTINUOUS
Status: DISCONTINUED | OUTPATIENT
Start: 2022-03-22 | End: 2022-03-23

## 2022-03-22 RX ORDER — ACETAMINOPHEN 160 MG/5ML
975 SOLUTION ORAL ONCE
Status: COMPLETED | OUTPATIENT
Start: 2022-03-22 | End: 2022-03-22

## 2022-03-22 RX ORDER — PANTOPRAZOLE SODIUM 40 MG/10ML
40 INJECTION, POWDER, LYOPHILIZED, FOR SOLUTION INTRAVENOUS ONCE
Status: COMPLETED | OUTPATIENT
Start: 2022-03-22 | End: 2022-03-22

## 2022-03-22 RX ORDER — DIPHENHYDRAMINE HYDROCHLORIDE 50 MG/ML
25 INJECTION INTRAMUSCULAR; INTRAVENOUS EVERY 4 HOURS PRN
Status: DISCONTINUED | OUTPATIENT
Start: 2022-03-22 | End: 2022-03-24 | Stop reason: HOSPADM

## 2022-03-22 RX ORDER — CYANOCOBALAMIN 1000 UG/ML
1000 INJECTION, SOLUTION INTRAMUSCULAR; SUBCUTANEOUS ONCE
Status: COMPLETED | OUTPATIENT
Start: 2022-03-23 | End: 2022-03-23

## 2022-03-22 RX ORDER — ONDANSETRON 4 MG/1
8 TABLET, ORALLY DISINTEGRATING ORAL EVERY 8 HOURS PRN
Status: DISCONTINUED | OUTPATIENT
Start: 2022-03-22 | End: 2022-03-24 | Stop reason: HOSPADM

## 2022-03-22 RX ORDER — GLYCOPYRROLATE 0.2 MG/ML
INJECTION INTRAMUSCULAR; INTRAVENOUS AS NEEDED
Status: DISCONTINUED | OUTPATIENT
Start: 2022-03-22 | End: 2022-03-22 | Stop reason: SURG

## 2022-03-22 RX ORDER — FENTANYL CITRATE 50 UG/ML
INJECTION, SOLUTION INTRAMUSCULAR; INTRAVENOUS AS NEEDED
Status: DISCONTINUED | OUTPATIENT
Start: 2022-03-22 | End: 2022-03-22 | Stop reason: SURG

## 2022-03-22 RX ORDER — TOPIRAMATE 25 MG/1
50 TABLET ORAL 2 TIMES DAILY
Status: DISCONTINUED | OUTPATIENT
Start: 2022-03-22 | End: 2022-03-24 | Stop reason: HOSPADM

## 2022-03-22 RX ORDER — CEFAZOLIN SODIUM IN 0.9 % NACL 3 G/100 ML
3 INTRAVENOUS SOLUTION, PIGGYBACK (ML) INTRAVENOUS
Status: COMPLETED | OUTPATIENT
Start: 2022-03-22 | End: 2022-03-22

## 2022-03-22 RX ORDER — SODIUM CHLORIDE, SODIUM LACTATE, POTASSIUM CHLORIDE, CALCIUM CHLORIDE 600; 310; 30; 20 MG/100ML; MG/100ML; MG/100ML; MG/100ML
100 INJECTION, SOLUTION INTRAVENOUS CONTINUOUS
Status: DISCONTINUED | OUTPATIENT
Start: 2022-03-22 | End: 2022-03-23

## 2022-03-22 RX ORDER — PROCHLORPERAZINE EDISYLATE 5 MG/ML
10 INJECTION INTRAMUSCULAR; INTRAVENOUS EVERY 6 HOURS PRN
Status: DISCONTINUED | OUTPATIENT
Start: 2022-03-22 | End: 2022-03-24 | Stop reason: HOSPADM

## 2022-03-22 RX ORDER — SODIUM CHLORIDE 0.9 % (FLUSH) 0.9 %
10 SYRINGE (ML) INJECTION EVERY 12 HOURS SCHEDULED
Status: DISCONTINUED | OUTPATIENT
Start: 2022-03-22 | End: 2022-03-22 | Stop reason: HOSPADM

## 2022-03-22 RX ORDER — EPHEDRINE SULFATE 5 MG/ML
INJECTION INTRAVENOUS AS NEEDED
Status: DISCONTINUED | OUTPATIENT
Start: 2022-03-22 | End: 2022-03-22 | Stop reason: SURG

## 2022-03-22 RX ORDER — ACETAMINOPHEN 160 MG/5ML
1000 SOLUTION ORAL EVERY 6 HOURS
Status: DISPENSED | OUTPATIENT
Start: 2022-03-22 | End: 2022-03-24

## 2022-03-22 RX ORDER — DEXAMETHASONE SODIUM PHOSPHATE 4 MG/ML
INJECTION, SOLUTION INTRA-ARTICULAR; INTRALESIONAL; INTRAMUSCULAR; INTRAVENOUS; SOFT TISSUE AS NEEDED
Status: DISCONTINUED | OUTPATIENT
Start: 2022-03-22 | End: 2022-03-22 | Stop reason: SURG

## 2022-03-22 RX ORDER — METOCLOPRAMIDE HYDROCHLORIDE 5 MG/ML
10 INJECTION INTRAMUSCULAR; INTRAVENOUS ONCE
Status: COMPLETED | OUTPATIENT
Start: 2022-03-22 | End: 2022-03-22

## 2022-03-22 RX ORDER — KETAMINE HCL IN NACL, ISO-OSM 100MG/10ML
SYRINGE (ML) INJECTION AS NEEDED
Status: DISCONTINUED | OUTPATIENT
Start: 2022-03-22 | End: 2022-03-22 | Stop reason: SURG

## 2022-03-22 RX ORDER — NALOXONE HCL 0.4 MG/ML
0.1 VIAL (ML) INJECTION
Status: DISCONTINUED | OUTPATIENT
Start: 2022-03-22 | End: 2022-03-24 | Stop reason: HOSPADM

## 2022-03-22 RX ORDER — ONDANSETRON 4 MG/1
8 TABLET, FILM COATED ORAL EVERY 6 HOURS PRN
Status: DISCONTINUED | OUTPATIENT
Start: 2022-03-22 | End: 2022-03-24 | Stop reason: HOSPADM

## 2022-03-22 RX ORDER — BUSPIRONE HYDROCHLORIDE 5 MG/1
10 TABLET ORAL 2 TIMES DAILY
Status: DISCONTINUED | OUTPATIENT
Start: 2022-03-22 | End: 2022-03-24 | Stop reason: HOSPADM

## 2022-03-22 RX ORDER — LIDOCAINE HYDROCHLORIDE 10 MG/ML
INJECTION, SOLUTION EPIDURAL; INFILTRATION; INTRACAUDAL; PERINEURAL AS NEEDED
Status: DISCONTINUED | OUTPATIENT
Start: 2022-03-22 | End: 2022-03-22 | Stop reason: SURG

## 2022-03-22 RX ORDER — GABAPENTIN 300 MG/1
300 CAPSULE ORAL ONCE
Status: DISCONTINUED | OUTPATIENT
Start: 2022-03-22 | End: 2022-03-22 | Stop reason: HOSPADM

## 2022-03-22 RX ORDER — SODIUM CHLORIDE, SODIUM LACTATE, POTASSIUM CHLORIDE, CALCIUM CHLORIDE 600; 310; 30; 20 MG/100ML; MG/100ML; MG/100ML; MG/100ML
75 INJECTION, SOLUTION INTRAVENOUS CONTINUOUS
Status: DISCONTINUED | OUTPATIENT
Start: 2022-03-22 | End: 2022-03-24 | Stop reason: HOSPADM

## 2022-03-22 RX ADMIN — ONDANSETRON 8 MG: 2 INJECTION INTRAMUSCULAR; INTRAVENOUS at 21:42

## 2022-03-22 RX ADMIN — Medication 0.4 MG: at 10:33

## 2022-03-22 RX ADMIN — Medication 20 MG: at 08:20

## 2022-03-22 RX ADMIN — SCOPALAMINE 1 PATCH: 1 PATCH, EXTENDED RELEASE TRANSDERMAL at 06:40

## 2022-03-22 RX ADMIN — Medication 3 G: at 07:43

## 2022-03-22 RX ADMIN — PROPOFOL 200 MG: 10 INJECTION, EMULSION INTRAVENOUS at 07:38

## 2022-03-22 RX ADMIN — SODIUM CHLORIDE, SODIUM LACTATE, POTASSIUM CHLORIDE, AND CALCIUM CHLORIDE: .6; .31; .03; .02 INJECTION, SOLUTION INTRAVENOUS at 07:36

## 2022-03-22 RX ADMIN — PROPOFOL 100 MCG/KG/MIN: 10 INJECTION, EMULSION INTRAVENOUS at 07:42

## 2022-03-22 RX ADMIN — HYDROMORPHONE HYDROCHLORIDE 0.5 MG: 2 INJECTION, SOLUTION INTRAMUSCULAR; INTRAVENOUS; SUBCUTANEOUS at 16:55

## 2022-03-22 RX ADMIN — Medication 2 MG: at 10:30

## 2022-03-22 RX ADMIN — ROCURONIUM BROMIDE 50 MG: 50 INJECTION INTRAVENOUS at 07:38

## 2022-03-22 RX ADMIN — FENTANYL CITRATE 100 MCG: 50 INJECTION, SOLUTION INTRAMUSCULAR; INTRAVENOUS at 07:38

## 2022-03-22 RX ADMIN — Medication 2 MG: at 10:33

## 2022-03-22 RX ADMIN — HYDROMORPHONE HYDROCHLORIDE 0.5 MG: 2 INJECTION, SOLUTION INTRAMUSCULAR; INTRAVENOUS; SUBCUTANEOUS at 11:05

## 2022-03-22 RX ADMIN — ONDANSETRON 4 MG: 2 INJECTION INTRAMUSCULAR; INTRAVENOUS at 10:02

## 2022-03-22 RX ADMIN — TOPIRAMATE 50 MG: 25 TABLET, FILM COATED ORAL at 21:44

## 2022-03-22 RX ADMIN — EPHEDRINE SULFATE 20 MG: 5 INJECTION INTRAVENOUS at 08:11

## 2022-03-22 RX ADMIN — DEXAMETHASONE SODIUM PHOSPHATE 4 MG: 4 INJECTION, SOLUTION INTRAMUSCULAR; INTRAVENOUS at 07:50

## 2022-03-22 RX ADMIN — ONDANSETRON 8 MG: 2 INJECTION INTRAMUSCULAR; INTRAVENOUS at 12:31

## 2022-03-22 RX ADMIN — MIDAZOLAM 2 MG: 1 INJECTION INTRAMUSCULAR; INTRAVENOUS at 07:39

## 2022-03-22 RX ADMIN — HYDROMORPHONE HYDROCHLORIDE 0.5 MG: 2 INJECTION, SOLUTION INTRAMUSCULAR; INTRAVENOUS; SUBCUTANEOUS at 14:12

## 2022-03-22 RX ADMIN — ROCURONIUM BROMIDE 20 MG: 50 INJECTION INTRAVENOUS at 09:07

## 2022-03-22 RX ADMIN — SODIUM CHLORIDE, POTASSIUM CHLORIDE, SODIUM LACTATE AND CALCIUM CHLORIDE 150 ML/HR: 600; 310; 30; 20 INJECTION, SOLUTION INTRAVENOUS at 12:03

## 2022-03-22 RX ADMIN — HYDROMORPHONE HYDROCHLORIDE 2 MG: 2 TABLET ORAL at 21:44

## 2022-03-22 RX ADMIN — CHLORHEXIDINE GLUCONATE 15 ML: 1.2 SOLUTION ORAL at 06:40

## 2022-03-22 RX ADMIN — SODIUM CHLORIDE, POTASSIUM CHLORIDE, SODIUM LACTATE AND CALCIUM CHLORIDE 1000 ML: 600; 310; 30; 20 INJECTION, SOLUTION INTRAVENOUS at 06:59

## 2022-03-22 RX ADMIN — ACETAMINOPHEN 1000 MG: 160 SOLUTION ORAL at 17:39

## 2022-03-22 RX ADMIN — ROCURONIUM BROMIDE 10 MG: 50 INJECTION INTRAVENOUS at 09:28

## 2022-03-22 RX ADMIN — ACETAMINOPHEN 975 MG: 160 SUSPENSION ORAL at 06:40

## 2022-03-22 RX ADMIN — LIDOCAINE HYDROCHLORIDE 50 MG: 10 INJECTION, SOLUTION EPIDURAL; INFILTRATION; INTRACAUDAL; PERINEURAL at 07:38

## 2022-03-22 RX ADMIN — HYOSCYAMINE SULFATE 125 MCG: 0.12 TABLET ORAL at 21:44

## 2022-03-22 RX ADMIN — METOCLOPRAMIDE 10 MG: 5 INJECTION, SOLUTION INTRAMUSCULAR; INTRAVENOUS at 06:39

## 2022-03-22 RX ADMIN — Medication 0.4 MG: at 10:30

## 2022-03-22 RX ADMIN — ACETAMINOPHEN 1000 MG: 160 SOLUTION ORAL at 12:24

## 2022-03-22 RX ADMIN — EPHEDRINE SULFATE 10 MG: 5 INJECTION INTRAVENOUS at 08:08

## 2022-03-22 RX ADMIN — SODIUM CHLORIDE, POTASSIUM CHLORIDE, SODIUM LACTATE AND CALCIUM CHLORIDE 500 ML: 600; 310; 30; 20 INJECTION, SOLUTION INTRAVENOUS at 06:39

## 2022-03-22 RX ADMIN — Medication 30 MG: at 07:50

## 2022-03-22 RX ADMIN — PANTOPRAZOLE SODIUM 40 MG: 40 INJECTION, POWDER, LYOPHILIZED, FOR SOLUTION INTRAVENOUS at 06:39

## 2022-03-22 RX ADMIN — GLYCOPYRROLATE 0.2 MCG: 0.2 INJECTION INTRAMUSCULAR; INTRAVENOUS at 08:05

## 2022-03-22 RX ADMIN — ROCURONIUM BROMIDE 50 MG: 50 INJECTION INTRAVENOUS at 08:12

## 2022-03-22 RX ADMIN — BUSPIRONE HYDROCHLORIDE 10 MG: 5 TABLET ORAL at 21:44

## 2022-03-22 NOTE — OP NOTE
GASTRIC BYPASS LAPAROSCOPIC WITH DAVINCI ROBOT  Procedure Report    Patient Name:  Gwen Barnes  YOB: 1990    Date of Surgery:  3/22/2022     Indications:  This patient presents for elective laparoscopic Edwar-en-Y divided gastric bypass. The patient has undergone our extensive preoperative evaluation, teaching and consent process.     Pre-op Diagnosis:   Obesity, Class II, BMI 35-39.9 [E66.9]       Post-Op Diagnosis Codes:     * Obesity, Class II, BMI 35-39.9 [E66.9]    Procedure/CPT® Codes:      Procedure(s):  REVISION GASTRIC BYPASS LAPAROSCOPIC WITH DAVINCI ROBOT    Staff:  Surgeon(s):  Luna Keyes MD    Assistant: Rosetta Paredes RNFA  Assistant: Rosetta Paredes RNFA was responsible for performing the following activities: Retraction, Suction, Irrigation, and Suturing and their skilled assistance was necessary for the success of this case.    Anesthesia: General    Estimated Blood Loss: minimal    Implants:    Implant Name Type Inv. Item Serial No.  Lot No. LRB No. Used Action   DEV CLS WND VLOC/PBT AL NONABS 1/2CIR SZ2/0 27MM 15CM PAULINO - [id number] Implant DEV CLS WND VLOC/PBT AL NONABS 1/2CIR SZ2/0 27MM 15CM PAULINO  [facility] [id number] N/A 3 Implanted   RELOAD STPLR SUREFORM 60 DAVINCI/X/XI 6ROW 2.5 WHT 1P/U - [id number] Implant RELOAD STPLR SUREFORM 60 DAVINCI/X/XI 6ROW 2.5 WHT 1P/U  [employer] [id number] N/A 4 Implanted   RELOAD STPLR SUREFORM 60 DAVINCI/X/XI 6ROW 3.5 PAULINO 1P/U - [id number] Implant RELOAD STPLR SUREFORM 60 DAVINCI/X/XI 6ROW 3.5 PAULINO 1P/U  INTUITIVE SURGICAL [id number] N/A 2 Implanted   SEAL FIBRIN TISSEEL FZ 4ML - [id number] Implant SEAL FIBRIN TISSEEL FZ 4ML  [employer] [id number] N/A 1 Implanted   DEV CLS WND VLOC/180 AL ABS 1/2CIR SZ3/0 15CM 26MM GRN - [id number] Implant DEV CLS WND VLOC/180 AL ABS 1/2CIR SZ3/0 15CM 26MM GRN  [employer] [id number] N/A 1 Implanted   RELOAD STPLR SUREFORM 60 DAVINCI/X/XI 6ROW 3.5 APULINO 1P/U - [id number] Implant  RELOAD STPLR SUREFORM 60 DAVINCI/X/XI 6ROW 3.5 PAULINO 1P/U  INTUITIVE SURGICAL [id number] N/A 3 Implanted       Specimen:          None        Findings:  No hiatal hernia seen    Complications:  none  Description of Procedure:  The patient was brought to the operating room and placed in the supine position on the operating room table. The patient had previously received  IV antibiotics as well as SCD. The patient underwent uneventful general endotracheal anesthesia per the Anesthesiology staff. The abdomen was prepped with ChloraPrep and draped in the usual sterile fashion. An Ioban was used as well if not allergic.     A 1.5 cm transverse incision was made to the left of midline 21 cm inferior to the xiphoid process and the peritoneal cavity entered under direct camera visualization using a 5 mm 0° laparoscope and an 8 mm Optiview trocar. The abdomen was insufflated to a pressure of 15-16 mmHg with C02 gas. A 10 mm angled laparoscope was then used.     Exploratory laparoscopy revealed no evidence of injury from the entrance technique and otherwise no abnormalities unless addendum dictated below. Under direct camera visualization, a 12 mm trocar was placed in the right lateral subcostal position and a 12 mm trocar and an 8 mm trocar was placed in the left lateral subcostal position.  A small incision was made in the subxiphoid region for the liver retractor.  A Seb retractor was used.      A 40 Latvian orogastric tube was placed in the stomach for decompression.       I then chose an area along the lesser curve of the stomach approximately 6 centimeters distally to the GE junction and began dissection with the vessel sealer.  I entered the retrogastric space and then placed a  60 mm blue load in this area for a horizontal staple line.   I did use 1 additional horizontal firing for the very last few millimeters of the horizontal staple line.  A 40 Latvian bougie was placed into the gastric pouch and she did have some  redundant fundus but only a slight amount and I did 2 vertical firings of a blue load to trim this extra gastric pouch.     I divided the omentum with the vessel sealer starting with the area near the transverse colon and worked my way superiorly to make way for the Edwar limb.      I then identified the ligament of Treitz and ran the bowel distally 70 cm and brought this loop up to the gastric pouch.  I used a 2-0 silk suture to becca this point and then used another 2-0 silk suture about 6 cm proximal to this so that I would be able to maintain my orientation.    During this process I did have some difficulty with range of motion due to external arm collisions of the robot and I did notice 2 very small deserosalization's of the small bowel that were each closed with 2-0 silk suture.       I then performed the gastrojejunostomy.  With the proximal biliary limb on the left side and the distal alimentary limb on the right side, I performed a linear gastrojejunostomy by making a gastrotomy in the gastric pouch on the posterior side near the staple line in the midline of this region and then also made an enterotomy in the jejunum.  I used a 60 mm Hollywood stapler with a blue load and made the staple line 30 mm.  I was then able to inspect the staple line and there was no bleeding.  I then closed the enterotomy with a running 3-0 absorbable V-Loc suture by starting the suture on the left side and sewed to the right side.  At this point I decided to perform a leak test.    ICG was injected in the orogastric tube and there was a very slight amount of ICG fluid seen on the right side posteriorly and I oversewed this with a 3-0 absorbable V-Loc suture.  Subsequent views with firefly did not show any evidence of extravasation.      I used a white load and fired it across the jejunum just to the left of the gastrojejunostomy and then at that point the limb on the patient's left side was the biliopancreatic limb.        After  this I ran the Edwar limb distally 130 cm and then performed a side-to-side anastomosis with a white loads using the 60 stapler using a triple staple technique.  The common enterotomy was stapled off.  I also placed a 2-0 silk on the left side of the crotch of the anastomosis for support.      I then closed the mesenteric defect at the jejunojejunostomy with a running 2-0 nonabsorbable V-Loc and I also closed Soriano space with a running 2-0 nonabsorbable V-Loc suture.      Once again I inspected the gastric pouch and performed another leak test and there was no evidence of leak.     Tisseal was sprayed over the gastrojejunostomy and also the mesentery between the distal biliary limb and the gastrojejunostomy.  The liver retractor was removed.     Then the abdomen was desufflated and all the trochars were removed and the drain was secured to the skin with 2-0 silk suture.  All the skin incisions were closed with 4-0 Monocryl and surgical glue.        Luna Keyes MD     Date: 3/22/2022  Time: 10:15 EDT

## 2022-03-22 NOTE — PLAN OF CARE
Goal Outcome Evaluation:              Outcome Evaluation: pt up to floor, drowsy at this time but was able to discuss some with pt and family at bedside expectations and restrictions. IS was provided but was unable to do at this time due to being too drowsy. Will continue to monitor and ambulate when pt is more awake and safe to do so.

## 2022-03-22 NOTE — ANESTHESIA POSTPROCEDURE EVALUATION
Patient: Gwen Barnes    Procedure Summary     Date: 03/22/22 Room / Location: [facility] OR 08 / [facility] MAIN OR    Anesthesia Start: 0736 Anesthesia Stop: 1044    Procedure: REVISION GASTRIC BYPASS LAPAROSCOPIC WITH DAVINCI ROBOT (N/A Abdomen) Diagnosis:       Obesity, Class II, BMI 35-39.9      (Obesity, Class II, BMI 35-39.9 [E66.9])    Surgeons: Luna Keyes MD Provider: Juan Fu MD    Anesthesia Type: general, ERAS Protocol ASA Status: 3          Anesthesia Type: general, ERAS Protocol    Vitals  Vitals Value Taken Time   /83 03/22/22 1139   Temp 97.9 °F (36.6 °C) 03/22/22 1139   Pulse 87 03/22/22 1141   Resp 25 03/22/22 1139   SpO2 96 % 03/22/22 1141   Vitals shown include unvalidated device data.        Post Anesthesia Care and Evaluation    Patient location during evaluation: PACU  Patient participation: complete - patient participated  Level of consciousness: awake  Pain scale: See nurse's notes for pain score.  Pain management: adequate  Airway patency: patent  Anesthetic complications: No anesthetic complications  PONV Status: none  Cardiovascular status: acceptable  Respiratory status: acceptable  Hydration status: acceptable    Comments: Patient seen and examined postoperatively; vital signs stable; SpO2 greater than or equal to 90%; cardiopulmonary status stable; nausea/vomiting adequately controlled; pain adequately controlled; no apparent anesthesia complications; patient discharged from anesthesia care when discharge criteria were met

## 2022-03-22 NOTE — PAYOR COMM NOTE
"Chico Barnes (32 y.o. Female)  1990  OP Report for Prior Approved Elective Surgery  Ref #[id number]    ROJELIO SKELTON LPN UR  Utilization Review Nurse  [facility]  Direct & confidential phone # [phone]  Fax # [phone]              Date of Birth   1990    Social Security Number       Address   [address] [place] [address] [place] [facility]    Home Phone   [phone]    MRN   [id number]       Judaism   [facility]    Marital Status   Single                            Admission Date   3/22/22    Admission Type   Elective    Admitting Provider   Luna Keyes MD    Attending Provider   Luna Keyes MD    Department, Room/Bed   [facility] SURGICAL INPATIENT,        Discharge Date       Discharge Disposition       Discharge Destination                               Attending Provider: Luna Keyes MD    Allergies: No Known Allergies    Isolation: None   Infection: COVID (History) (22)   Code Status: CPR   Advance Care Planning Activity    Ht: 172.7 cm (68\")   Wt: 125 kg (276 lb 3.2 oz)    Admission Cmt: None   Principal Problem: Obesity, Class II, BMI 35-39.9 [E66.9] More...                 Active Insurance as of 3/22/2022     Primary Coverage     Payor Plan Insurance Group Employer/Plan Group    [id number]     Payor Plan Address Payor Plan Phone Number Payor Plan Fax Number Effective Dates    MAIL STOP:   2020 - None Entered    [address] [address]       [facility] [id number]       Subscriber Name Subscriber Birth Date Member ID       CHICO BARNES 1990 [id number]                 Emergency Contacts      (Rel.) Home Phone Work Phone Mobile Phone    IVON BLACKBURN (Mother) -- -- [phone]    MCKINLEY HORAN (Significant Other) [phone] -- [phone]               Operative/Procedure Notes (last 24 hours)      Luna Keyes MD at 22 0758          GASTRIC BYPASS LAPAROSCOPIC WITH DAVINCI " ROBOT  Procedure Report    Patient Name:  Gwen Barnes  YOB: 1990    Date of Surgery:  3/22/2022     Indications:  This patient presents for elective laparoscopic Edwar-en-Y divided gastric bypass. The patient has undergone our extensive preoperative evaluation, teaching and consent process.     Pre-op Diagnosis:   Obesity, Class II, BMI 35-39.9 [E66.9]       Post-Op Diagnosis Codes:     * Obesity, Class II, BMI 35-39.9 [E66.9]    Procedure/CPT® Codes:      Procedure(s):  REVISION GASTRIC BYPASS LAPAROSCOPIC WITH DAVINCI ROBOT    Staff:  Surgeon(s):  Luna Keyes MD    Assistant: Rosetta Paredes RNFA  Assistant: Rosetta Paredes RNFA was responsible for performing the following activities: Retraction, Suction, Irrigation, and Suturing and their skilled assistance was necessary for the success of this case.    Anesthesia: General    Estimated Blood Loss: minimal    Implants:    Implant Name Type Inv. Item Serial No.  Lot No. LRB No. Used Action   DEV CLS WND VLOC/PBT AL NONABS 1/2CIR SZ2/0 27MM 15CM PAULINO - [id number] Implant DEV CLS WND VLOC/PBT AL NONABS 1/2CIR SZ2/0 27MM 15CM PAULINO  COVIDI [id number] N/A 3 Implanted   RELOAD STPLR SUREFORM 60 DAVINCI/X/XI 6ROW 2.5 WHT 1P/U - [id number] Implant RELOAD STPLR SUREFORM 60 DAVINCI/X/XI 6ROW 2.5 WHT 1P/U  INTUITIVE SURGICAL [id number] N/A 4 Implanted   RELOAD STPLR SUREFORM 60 DAVINCI/X/XI 6ROW 3.5 PAULINO 1P/U - [id number] Implant RELOAD STPLR SUREFORM 60 DAVINCI/X/XI 6ROW 3.5 PAULINO 1P/U  INTUITIVE SURGICAL [id number] N/A 2 Implanted   SEAL FIBRIN TISSEEL FZ 4ML - [id number] Implant SEAL FIBRIN TISSEEL FZ 4ML  [employer] [id number] N/A 1 Implanted   DEV CLS WND VLOC/180 AL ABS 1/2CIR SZ3/0 15CM 26MM GRN - [id number] Implant DEV CLS WND VLOC/180 AL ABS 1/2CIR SZ3/0 15CM 26MM GRN  COVIDIEN [id number] N/A 1 Implanted   RELOAD STPLR SUREFORM 60 DAVINCI/X/XI 6ROW 3.5 PAULINO 1P/U - [id number] Implant RELOAD STPLR SUREFORM 60 DAVINCI/X/XI 6ROW  3.5 PAULINO 1P/U  INTUITIVE SURGICAL [id number] N/A 3 Implanted       Specimen:          None        Findings:  No hiatal hernia seen    Complications:  none  Description of Procedure:  The patient was brought to the operating room and placed in the supine position on the operating room table. The patient had previously received  IV antibiotics as well as SCD. The patient underwent uneventful general endotracheal anesthesia per the Anesthesiology staff. The abdomen was prepped with ChloraPrep and draped in the usual sterile fashion. An Ioban was used as well if not allergic.     A 1.5 cm transverse incision was made to the left of midline 21 cm inferior to the xiphoid process and the peritoneal cavity entered under direct camera visualization using a 5 mm 0° laparoscope and an 8 mm Optiview trocar. The abdomen was insufflated to a pressure of 15-16 mmHg with C02 gas. A 10 mm angled laparoscope was then used.     Exploratory laparoscopy revealed no evidence of injury from the entrance technique and otherwise no abnormalities unless addendum dictated below. Under direct camera visualization, a 12 mm trocar was placed in the right lateral subcostal position and a 12 mm trocar and an 8 mm trocar was placed in the left lateral subcostal position.  A small incision was made in the subxiphoid region for the liver retractor.  A Seb retractor was used.      A 40 Cuban orogastric tube was placed in the stomach for decompression.       I then chose an area along the lesser curve of the stomach approximately 6 centimeters distally to the GE junction and began dissection with the vessel sealer.  I entered the retrogastric space and then placed a  60 mm blue load in this area for a horizontal staple line.   I did use 1 additional horizontal firing for the very last few millimeters of the horizontal staple line.  A 40 Cuban bougie was placed into the gastric pouch and she did have some redundant fundus but only a slight amount  and I did 2 vertical firings of a blue load to trim this extra gastric pouch.     I divided the omentum with the vessel sealer starting with the area near the transverse colon and worked my way superiorly to make way for the Edwar limb.      I then identified the ligament of Treitz and ran the bowel distally 70 cm and brought this loop up to the gastric pouch.  I used a 2-0 silk suture to becca this point and then used another 2-0 silk suture about 6 cm proximal to this so that I would be able to maintain my orientation.    During this process I did have some difficulty with range of motion due to external arm collisions of the robot and I did notice 2 very small deserosalization's of the small bowel that were each closed with 2-0 silk suture.       I then performed the gastrojejunostomy.  With the proximal biliary limb on the left side and the distal alimentary limb on the right side, I performed a linear gastrojejunostomy by making a gastrotomy in the gastric pouch on the posterior side near the staple line in the midline of this region and then also made an enterotomy in the jejunum.  I used a 60 mm [place] stapler with a blue load and made the staple line 30 mm.  I was then able to inspect the staple line and there was no bleeding.  I then closed the enterotomy with a running 3-0 absorbable V-Loc suture by starting the suture on the left side and sewed to the right side.  At this point I decided to perform a leak test.    ICG was injected in the orogastric tube and there was a very slight amount of ICG fluid seen on the right side posteriorly and I oversewed this with a 3-0 absorbable V-Loc suture.  Subsequent views with firefly did not show any evidence of extravasation.      I used a white load and fired it across the jejunum just to the left of the gastrojejunostomy and then at that point the limb on the patient's left side was the biliopancreatic limb.        After this I ran the Edwar limb distally 130 cm and  then performed a side-to-side anastomosis with a white loads using the 60 stapler using a triple staple technique.  The common enterotomy was stapled off.  I also placed a 2-0 silk on the left side of the crotch of the anastomosis for support.      I then closed the mesenteric defect at the jejunojejunostomy with a running 2-0 nonabsorbable V-Loc and I also closed Soriano space with a running 2-0 nonabsorbable V-Loc suture.      Once again I inspected the gastric pouch and performed another leak test and there was no evidence of leak.     Tisseal was sprayed over the gastrojejunostomy and also the mesentery between the distal biliary limb and the gastrojejunostomy.  The liver retractor was removed.     Then the abdomen was desufflated and all the trochars were removed and the drain was secured to the skin with 2-0 silk suture.  All the skin incisions were closed with 4-0 Monocryl and surgical glue.        Luna Keyes MD     Date: 3/22/2022  Time: 10:15 EDT    Electronically signed by Luna Keyes MD at 03/22/22 1038

## 2022-03-22 NOTE — H&P
Bariatric Consult:  Referred by Gadiel Barrett MD    Gwen Barnes is here today for consult on morbid obesity    History of Present Illness:     Gwen Barnes is a 32 y.o. female with morbid obesity with co-morbidities including GERD and polycystic ovarian disease who presents for surgical consultation for the above procedure. Gwen has completed the initial intake visit and has been examined by our nurse practitioner, dietician, psychologist and underwent the extensive educational teaching process under the guidance of our bariatric coordinator and myself. Gwen also has seen the educational video HUSEYIN on the surgical procedure if available. Gwen attended today more educational teaching from our bariatric coordinator and myself. Gwen has had an extensive medical workup including a visit with their primary care physician, EKG, chest radiograph, blood work, EGD or UGI and possibly further testing. These have been reviewed by me and discussed with the patient. Gwen is now ready to proceed with surgery. Gwen presently denies nausea, vomiting, fever, chills, chest pain, shortness of air, melena, hematochezia, hemetemesis, dysuria, frequency, hematuria, jaundice or abdominal pain.       She had a history of gastric sleeve in 2019 and initially lost 135 pounds.  She has regained about half of that weight.  She now has symptoms of GERD including waking up in the middle of the night feeling like she is going to vomit and a lot of burning in her chest.  An EGD did show esophagitis.  She takes a PPI as well as Pepcid and Carafate.    Wt Readings from Last 10 Encounters:   03/22/22 125 kg (276 lb 3.2 oz)   02/21/22 127 kg (279 lb 3.2 oz)   01/17/22 124 kg (274 lb 3.2 oz)   11/04/21 119 kg (262 lb 5.6 oz)   10/20/21 117 kg (257 lb)   09/15/21 114 kg (252 lb 6.4 oz)   08/12/21 113 kg (250 lb 3.2 oz)   08/02/21 115 kg (254 lb 6.4 oz)   06/25/21 109 kg (241 lb)   05/15/21 112 kg (246 lb 0.5 oz)       Past Medical  History:   Diagnosis Date   • Anxiety    • Depression    • Fatigue    • GERD (gastroesophageal reflux disease)    • Heartburn    • Morbid obesity (HCC)    • PCOS (polycystic ovarian syndrome)        @DXC@    Past Surgical History:   Procedure Laterality Date   • CHOLECYSTECTOMY N/A 11/5/2019    Procedure: CHOLECYSTECTOMY LAPAROSCOPIC;  Surgeon: Luna Keyes MD;  Location: [facility] MAIN OR;  Service: General   • DILATATION AND CURETTAGE  2014 & 2019   • ENDOSCOPY     • ENDOSCOPY N/A 11/4/2021    Procedure: ESOPHAGOGASTRODUODENOSCOPY with biopsy x 2 areas;  Surgeon: Luna Keyes MD;  Location: [facility] ENDOSCOPY;  Service: General;  Laterality: N/A;  post op: esophagitis   • GASTRIC SLEEVE LAPAROSCOPIC N/A 8/6/2019    Procedure: laparoscopic sleeve gastrectomy;  Surgeon: Luna Keyes MD;  Location: [facility] MAIN OR;  Service: Bariatric   • TONSILLECTOMY AND ADENOIDECTOMY  2002       Patient Active Problem List   Diagnosis   • Obesity, Class III, BMI 40-49.9 (morbid obesity) (HCC)   • Body mass index 45.0-49.9, adult (HCC)   • Encounter for other preprocedural examination   • Heartburn   • Malaise and fatigue   • Polycystic ovaries   • Morbid (severe) obesity due to excess calories (HCC)   • Gastroesophageal reflux disease   • Moderately severe depression (HCC)   • Generalized anxiety disorder   • Morbid obesity (HCC)   • Obesity, Class II, BMI 35-39.9       No Known Allergies      Current Facility-Administered Medications:   •  ceFAZolin in Sodium Chloride (ANCEF) IVPB solution 3 g, 3 g, Intravenous, 30 Min Pre-Op, Luna Keyes MD  •  gabapentin (NEURONTIN) capsule 300 mg, 300 mg, Oral, Once, Luna Keyes MD  •  lactated ringers infusion 1,000 mL, 1,000 mL, Intravenous, Continuous, Juan Fu MD, Last Rate: 25 mL/hr at 03/22/22 0659, 1,000 mL at 03/22/22 0659  •  lactated ringers infusion, 100 mL/hr, Intravenous, Continuous, Luna Keyes MD  •  lidocaine (XYLOCAINE) 1 % injection 0.5 mL, 0.5 mL, Intradermal, Once  Tank VIDES Thomas Andrew, MD  •  promethazine (PHENERGAN) tablet 12.5 mg, 12.5 mg, Oral, Once **OR** promethazine (PHENERGAN) suppository 12.5 mg, 12.5 mg, Rectal, Once, Luna Keyes MD  •  scopolamine patch 1 mg/72 hr, 1 patch, Transdermal, Once, Luna Keyes MD, 1 patch at 03/22/22 0640  •  sodium chloride 0.9 % flush 1-10 mL, 1-10 mL, Intravenous, PRZEFERINO, Luna Keyes MD  •  sodium chloride 0.9 % flush 10 mL, 10 mL, Intravenous, Q12H, Luna Keyes MD  •  sodium chloride 0.9 % flush 10 mL, 10 mL, Intravenous, Tank VIDES Thomas Andrew, MD    Social History     Socioeconomic History   • Marital status: Single   Tobacco Use   • Smoking status: Never Smoker   • Smokeless tobacco: Never Used   Vaping Use   • Vaping Use: Never used   Substance and Sexual Activity   • Alcohol use: Not Currently   • Drug use: No   • Sexual activity: Defer       Family History   Problem Relation Age of Onset   • Obesity Sister    • Diabetes type II Maternal Grandmother    • Alzheimer's disease Maternal Grandmother    • Lung cancer Maternal Grandfather    • Lung cancer Paternal Grandmother        Review of Systems:  Review of Systems   Constitutional: Negative.    HENT: Negative.    Eyes: Negative.    Respiratory: Negative.    Cardiovascular: Positive for chest pain.   Gastrointestinal: Positive for abdominal pain and vomiting.   Endocrine: Negative.    Genitourinary: Negative.    Musculoskeletal: Negative.    Skin: Negative.    Allergic/Immunologic: Negative.    Neurological: Negative.    Hematological: Negative.    Psychiatric/Behavioral: Negative.          Physical Exam:      Vitals:    03/22/22 0612   BP: 127/65   Pulse: 82   Resp: 22   Temp: 98.4 °F (36.9 °C)   SpO2: 96%       Vital Signs:      Vitals:    01/28/22 1603 03/11/22 1325 03/22/22 0612   BP:   127/65   BP Location:   Left arm   Patient Position:   Sitting   Pulse:   82   Resp:   22   Temp:   98.4 °F (36.9 °C)   TempSrc:   Oral   SpO2:   96%   Weight: 124 kg (273 lb)  125 kg  "(276 lb 3.2 oz)   Height: 172.7 cm (68\") 172.7 cm (68\") 172.7 cm (68\")     Body mass index is 42 kg/m².      Physical Exam  Vitals reviewed.   Constitutional:       Appearance: She is well-developed.   HENT:      Head: Normocephalic and atraumatic.   Eyes:      Conjunctiva/sclera: Conjunctivae normal.      Pupils: Pupils are equal, round, and reactive to light.   Cardiovascular:      Rate and Rhythm: Normal rate and regular rhythm.      Heart sounds: Normal heart sounds.   Pulmonary:      Effort: Pulmonary effort is normal.      Breath sounds: Normal breath sounds.   Abdominal:      General: Bowel sounds are normal. There is no distension.      Palpations: Abdomen is soft. There is no mass.      Tenderness: There is no abdominal tenderness. There is no guarding or rebound.      Hernia: No hernia is present.   Musculoskeletal:         General: Normal range of motion.      Cervical back: Normal range of motion and neck supple.   Skin:     General: Skin is warm and dry.   Neurological:      Mental Status: She is alert and oriented to person, place, and time.           Assessment:    Gwen Barnes is a 32 y.o. year old female with medically complicated severe obesity with a BMI of Body mass index is 42 kg/m². and multiple co-morbidities listed in the encounter diagnosis.    I think she is an appropriate candidate for this surgery, and is ready to proceed.    The patient has returned to the office for a surgical consultation and has requested to proceed with the Edwar-en-Y  gastric bypass.  I have had the opportunity to obtain the history, examine the patient and review the patient's chart.    The patient understands that surgery is a tool and that weight loss is not guaranteed but only seen in the context of appropriate use, regular follow up, exercise and making appropriate food choices.      I have personally discussed the potential complications of the laparoscopic gastric bypass with this patient.  The patient is well " aware of the potential complications of the surgery that include but not limited to bleeding, infections, deep venous thrombosis, pulmonary embolism, pulmonary complications such as pneumonia, cardiac events, hernias, small bowel obstruction, damage to the spleen or other organs, bowel injury, disfiguring scars, failure to lose weight, need for additional surgery, conversion to an open procedure and death.  I also discussed the risks that apply in particular to the gastric bypass such as the leaking of stomach and/or intestinal contents at the staple or suture line, the development of an intra-abdominal abscess,  strictures, ulcers, and vitamin/mineral deficiencies.  The patient was strongly advised to avoid smoking and the use of non-steroidal anti-inflammatory drugs such as ibuprofen, Motrin and Advil in the postoperative period and understands the increased risk of ulcer formation, perforation, death if they did not stop the use of these medications/substances.     The risks, benefits, potential complications and alternative therapies were discussed at great lengths as outlined in our extensive consent forms, online consent, and educational teaching processes.      The patient has confirmed participation in the program's extensive educational activities.      All questions and concerns were answered to the patient's satisfaction.  The patient now wishes to proceed with surgery.    The patient [default value] pre-operative insertion of an IVC filter.    The patient [default value] a postoperative course of anticoagulant therapy.    Plan/Discussion/Summary:  EGD with esophagitis     Weight regain after gastric bypass, GERD. BMI 42 with PCOS    I instructed patient to start on a H2 blocker or proton pump inhibitor if not already on one of these medications.    I explained in detail the procedures that we perform.  All of these procedures have a chance to convert to open if any technical challenges or complications  do occur.  Bariatric surgery is not cosmetic surgery but rather a tool to help a patient make a life-long commitment lifestyle change including diet, exercise, behavior changes, and taking supplemental vitamins and minerals.    Problems after surgery may require more operations to correct them.    The risks, benefits, alternatives, and potential complications of all of the procedures were explained in detail including, but not limited to death, anesthesia and medication adverse effect, deep venous thrombosis, pulmonary embolism, trocar site/incisional hernia, wound infection, abdominal infection, bleeding, failure to lose weight, gain weight, a change in body image, metabolic complications with vitamin deficiences and anemia.    Weight loss expectations were discussed with the patient in detail. The weight loss operations most commonly performed are the sleeve gastrectomy and the Edwar-en-Y gastric bypass. These operations result in weight losses up to approximately 25-35% of initial body weight 12 to 24 months after surgery with the gastric bypass usually the higher percent of weight loss but depends on patient using the tool.    For the gastric bypass and loop duodenal switch (ANALIA-S) the risks include but not limited to the following early complications:  Anastomotic leak/peritonitis, Edwar/Alimentary/biliopancreatic limb obstruction, severe & minor wound infection/seroma, and nausea/vomiting.  Late complications can include but are not limited to malnutrition, vitamin deficiencies, frequent loose stools,  stomal stenosis, marginal ulcer, bowel obstruction, intussusception, internal, and incisional hernia.    Regarding the gastric sleeve, there is less long-term outcome data and higher risk of dysphagia and reflux compared to a gastric bypass, as well as risk of internal visceral/organ injury, splenectomy, bleeding, infection, leak (which could require further intervention possible conversion to Edwar-en-Y gastric  bypass), stenosis and possibility of regaining weight.    Gwen was counseled regarding diagnostic results, instructions for management, risk factor reductions, prognosis, patient and family education, impressions, risks and benefits of treatment options and importance of compliance with treatment. Total face to face time of the encounter was over 45 minutes and over 30 minutes was spent counseling.     INSPECT Report   As part of this patient's treatment plan I am prescribing controlled substances. The patient has been made aware of appropriate use of such medications, including potential risk of somnolence, limited ability to drive and /or work safely, and potential for dependence or overdose. It has also been made clear that these medications are for use by this patient only, without concomitant use of alcohol or other substances unless prescribed.    Gwen has completed prescribing agreement detailing terms of continued prescribing of controlled substances, including monitoring INSPECT reports, urine drug screening, and pill counts if necessary. Gwen is aware that inappropriate use will result in cessation of prescribing such medications.    INSPECT report has been reviewed      History and physical exam exhibit continued safe and appropriate use of controlled substances.      Gwen understands the surgical procedures and the different surgical options that are available.  She understands the lifestyle changes that are required after surgery and has agreed to follow the guidelines outlined in the weight management program.  She also expressed understanding of the risks involved and had all of female questions answered and desires to proceed.      Luna Keyes MD  3/22/2022

## 2022-03-22 NOTE — ANESTHESIA PREPROCEDURE EVALUATION
Anesthesia Evaluation     Patient summary reviewed and Nursing notes reviewed   no history of anesthetic complications:  NPO Solid Status: > 8 hours  NPO Liquid Status: > 2 hours           Airway   Dental      Pulmonary    Cardiovascular     ECG reviewed        Neuro/Psych  (+) psychiatric history Anxiety and Depression,    GI/Hepatic/Renal/Endo    (+) morbid obesity, GERD,      Musculoskeletal     Abdominal    Substance History      OB/GYN          Other        ROS/Med Hx Other: PCOS, fatigue, malaise    PSH  TONSILLECTOMY AND ADENOIDECTOMY DILATATION AND CURETTAGE  GASTRIC SLEEVE LAPAROSCOPIC CHOLECYSTECTOMY  ENDOSCOPY ENDOSCOPY                  Anesthesia Plan    ASA 3     general and ERAS Protocol   total IV anesthesia(Patient identified; pre-operative vital signs, all relevant labs/studies, complete medical/surgical/anesthetic history, full medication list, full allergy list, and NPO status obtained/reviewed; physical assessment performed; anesthetic options, side effects, potential complications, risks, and benefits discussed; questions answered; written anesthesia consent obtained; patient cleared for procedure; anesthesia machine and equipment checked and functioning)  intravenous induction     Anesthetic plan, all risks, benefits, and alternatives have been provided, discussed and informed consent has been obtained with: patient.    Plan discussed with CRNA and CAA.        CODE STATUS:

## 2022-03-22 NOTE — ANESTHESIA PROCEDURE NOTES
Airway  Urgency: elective    Date/Time: 3/22/2022 7:41 AM  Airway not difficult    General Information and Staff    Patient location during procedure: OR    Indications and Patient Condition  Indications for airway management: airway protection    Preoxygenated: yes  Mask difficulty assessment: 1 - vent by mask    Final Airway Details  Final airway type: endotracheal airway      Successful airway: ETT    Successful intubation technique: direct laryngoscopy  Endotracheal tube insertion site: oral  Blade: Buitrago  Blade size: 2  ETT size (mm): 7.0  Cormack-Lehane Classification: grade I - full view of glottis  Placement verified by: chest auscultation   Measured from: lips  ETT/EBT  to lips (cm): 21  Number of attempts at approach: 1  Assessment: lips, teeth, and gum same as pre-op and atraumatic intubation

## 2022-03-23 PROCEDURE — 25010000002 HYDROMORPHONE PER 4 MG: Performed by: SURGERY

## 2022-03-23 PROCEDURE — 99024 POSTOP FOLLOW-UP VISIT: CPT | Performed by: NURSE PRACTITIONER

## 2022-03-23 PROCEDURE — 25010000002 CYANOCOBALAMIN PER 1000 MCG: Performed by: SURGERY

## 2022-03-23 PROCEDURE — 25010000002 THIAMINE PER 100 MG: Performed by: SURGERY

## 2022-03-23 PROCEDURE — 25010000002 ENOXAPARIN PER 10 MG: Performed by: SURGERY

## 2022-03-23 RX ADMIN — THIAMINE HYDROCHLORIDE 100 ML/HR: 100 INJECTION, SOLUTION INTRAMUSCULAR; INTRAVENOUS at 01:49

## 2022-03-23 RX ADMIN — HYDROMORPHONE HYDROCHLORIDE 0.5 MG: 2 INJECTION, SOLUTION INTRAMUSCULAR; INTRAVENOUS; SUBCUTANEOUS at 08:36

## 2022-03-23 RX ADMIN — BUSPIRONE HYDROCHLORIDE 10 MG: 5 TABLET ORAL at 08:36

## 2022-03-23 RX ADMIN — HYDROMORPHONE HYDROCHLORIDE 2 MG: 2 TABLET ORAL at 22:04

## 2022-03-23 RX ADMIN — ESCITALOPRAM OXALATE 20 MG: 10 TABLET ORAL at 08:36

## 2022-03-23 RX ADMIN — HYDROMORPHONE HYDROCHLORIDE 2 MG: 2 TABLET ORAL at 17:53

## 2022-03-23 RX ADMIN — ENOXAPARIN SODIUM 40 MG: 40 INJECTION SUBCUTANEOUS at 22:04

## 2022-03-23 RX ADMIN — HYDROMORPHONE HYDROCHLORIDE 0.5 MG: 2 INJECTION, SOLUTION INTRAMUSCULAR; INTRAVENOUS; SUBCUTANEOUS at 00:39

## 2022-03-23 RX ADMIN — SODIUM CHLORIDE, POTASSIUM CHLORIDE, SODIUM LACTATE AND CALCIUM CHLORIDE 150 ML/HR: 600; 310; 30; 20 INJECTION, SOLUTION INTRAVENOUS at 11:54

## 2022-03-23 RX ADMIN — ACETAMINOPHEN 1000 MG: 500 TABLET, FILM COATED ORAL at 17:53

## 2022-03-23 RX ADMIN — ACETAMINOPHEN 1000 MG: 500 TABLET, FILM COATED ORAL at 12:55

## 2022-03-23 RX ADMIN — PANTOPRAZOLE SODIUM 40 MG: 40 TABLET, DELAYED RELEASE ORAL at 06:05

## 2022-03-23 RX ADMIN — HYDROMORPHONE HYDROCHLORIDE 2 MG: 2 TABLET ORAL at 12:55

## 2022-03-23 RX ADMIN — TOPIRAMATE 50 MG: 25 TABLET, FILM COATED ORAL at 22:04

## 2022-03-23 RX ADMIN — BUSPIRONE HYDROCHLORIDE 10 MG: 5 TABLET ORAL at 22:04

## 2022-03-23 RX ADMIN — ACETAMINOPHEN 1000 MG: 500 TABLET, FILM COATED ORAL at 00:26

## 2022-03-23 RX ADMIN — ACETAMINOPHEN 1000 MG: 500 TABLET, FILM COATED ORAL at 06:05

## 2022-03-23 RX ADMIN — ENOXAPARIN SODIUM 40 MG: 40 INJECTION SUBCUTANEOUS at 08:36

## 2022-03-23 RX ADMIN — CYANOCOBALAMIN 1000 MCG: 1000 INJECTION, SOLUTION INTRAMUSCULAR at 08:37

## 2022-03-23 NOTE — PLAN OF CARE
Goal Outcome Evaluation:  Plan of Care Reviewed With: patient           Outcome Evaluation: Pt ambulated around the unit several times throughout the shift. Pain was treated with hydromorphone PO, but it was ineffective according the patient. IV hydromorphone was given and it was effective. No other complaints from the patient at this time.

## 2022-03-23 NOTE — PROGRESS NOTES
"Subjective:       Gwen Barnes  is post op day one status post procedure listed. Patient denies shortness of air and lower extremity pain. Feels better than yesterday. No vomiting/ nasuea this am. Ambulating well and using incentive spirometer.   Pt is having some left sided abdominal pain which is worse with movement. She also reports some upper chest discomfort which is worse with breathing. Denies cough/ fever/ shortness of breath.  Pt has been walking some and using her incentive spirometer.      Objective:        /65 (BP Location: Left arm, Patient Position: Lying)   Pulse 71   Temp 97.9 °F (36.6 °C) (Oral)   Resp 18   Ht 172.7 cm (68\")   Wt 125 kg (276 lb 3.2 oz)   LMP  (Approximate) Comment: Pt states had some bleeding \"around 3 days ago.\"  SpO2 96%   BMI 42.00 kg/m²     General:  alert, appears stated age and cooperative   Abdomen: soft, bowel sounds active, appropriate tenderness   Incision:   healing well, no drainage, no erythema, no hernia, no seroma, no swelling, no dehiscence, incision well approximated   Heart: Regular rate   Lungs: Clear to auscultation bilaterally     I reviewed the patient's new clinical results. Labs and vitals reviewed and stable. Leukocytosis noted but likely reactive from surgery     Lab Results (last 24 hours)     Procedure Component Value Units Date/Time    Comprehensive Metabolic Panel [[id number]]  (Abnormal) Collected: 03/22/22 1805    Specimen: Blood Updated: 03/22/22 1845     Glucose 140 mg/dL      BUN 12 mg/dL      Creatinine 0.90 mg/dL      Sodium 136 mmol/L      Potassium 4.2 mmol/L      Chloride 100 mmol/L      CO2 23.0 mmol/L      Calcium 8.5 mg/dL      Total Protein 7.4 g/dL      Albumin 3.90 g/dL      ALT (SGPT) 32 U/L      AST (SGOT) 54 U/L      Alkaline Phosphatase 93 U/L      Total Bilirubin 0.4 mg/dL      Globulin 3.5 gm/dL      A/G Ratio 1.1 g/dL      BUN/Creatinine Ratio 13.3     Anion Gap 13.0 mmol/L      eGFR 88.4 mL/min/1.73      Comment: " National Kidney Foundation and American Society of Nephrology (ASN) Task Force recommended calculation based on the Chronic Kidney Disease Epidemiology Collaboration (CKD-EPI) equation refit without adjustment for race.       Narrative:      GFR Normal >60  Chronic Kidney Disease <60  Kidney Failure <15      Phosphorus [[id number]]  (Normal) Collected: 03/22/22 1805    Specimen: Blood Updated: 03/22/22 1845     Phosphorus 3.5 mg/dL     Magnesium [[id number]]  (Normal) Collected: 03/22/22 1805    Specimen: Blood Updated: 03/22/22 1845     Magnesium 1.9 mg/dL     CBC & Differential [[id number]]  (Abnormal) Collected: 03/22/22 1805    Specimen: Blood Updated: 03/22/22 1829    Narrative:      The following orders were created for panel order CBC & Differential.  Procedure                               Abnormality         Status                     ---------                               -----------         ------                     CBC Auto Differential[[id number]]        Abnormal            Final result                 Please view results for these tests on the individual orders.    CBC Auto Differential [[id number]]  (Abnormal) Collected: 03/22/22 1805    Specimen: Blood Updated: 03/22/22 1829     WBC 12.40 10*3/mm3      RBC 3.89 10*6/mm3      Hemoglobin 11.6 g/dL      Hematocrit 32.5 %      MCV 83.4 fL      MCH 29.9 pg      MCHC 35.8 g/dL      RDW 13.0 %      RDW-SD 38.5 fl      MPV 7.4 fL      Platelets 391 10*3/mm3      Neutrophil % 90.5 %      Lymphocyte % 7.0 %      Monocyte % 2.4 %      Eosinophil % 0.0 %      Basophil % 0.1 %      Neutrophils, Absolute 11.20 10*3/mm3      Lymphocytes, Absolute 0.90 10*3/mm3      Monocytes, Absolute 0.30 10*3/mm3      Eosinophils, Absolute 0.00 10*3/mm3      Basophils, Absolute 0.00 10*3/mm3      nRBC 0.0 /100 WBC              Assessment:      Doing well postoperatively.  Pt is not having any nausea or vomiting. Some abdominal pain noted on left side. Worse with movement. Pt also  reports some chest discomfort with breathing. Denies shortness of breath, chest pain, fever or cough.  Chest discomfort is likely related to trapped gas from surgery. I instructed pt to walk to try to relieve gas. If chest discomfort continues or gets worse, will get chest x ray to rule out pneumonia. Pt is tolerating some po fluids with encouragement. Encourage small sips of fluids throughout the day. Labs and vitals reviewed and stable. Dr. Keyes will round this afternoon and if pain improved, may possibly discharge pt later today or if needed tomorrow morning.     Plan:   1. Start diet  2. Continue with ambulation and Incentive spirometry  3. Plan for d/c home later today if tolerating diet and abdominal pain improved  4. Lovenox  5. Signs and symptoms of a leak reviewed with pt today  6. Chest pressure likely related to trapped gas as no fever, chest pain, shortness of breath or cough noted. I instructed pt to walk to help alleviate trapped gas.   7. Abdominal binder prescribed       LA Epps  [facility] Bariatrics and General Surgery

## 2022-03-23 NOTE — PLAN OF CARE
Goal Outcome Evaluation:              Outcome Evaluation: pt doing somewhat better with pain control today, still having some pain but encouraged her to ambulate frequently and continue to drink fluids. Dr. Keyes to see this afternoon to see if she is discharge ready today or needing one more day.

## 2022-03-24 ENCOUNTER — READMISSION MANAGEMENT (OUTPATIENT)
Dept: CALL CENTER | Facility: HOSPITAL | Age: 32
End: 2022-03-24

## 2022-03-24 ENCOUNTER — APPOINTMENT (OUTPATIENT)
Dept: GENERAL RADIOLOGY | Facility: HOSPITAL | Age: 32
End: 2022-03-24

## 2022-03-24 VITALS
SYSTOLIC BLOOD PRESSURE: 105 MMHG | WEIGHT: 276.2 LBS | TEMPERATURE: 97.9 F | RESPIRATION RATE: 15 BRPM | HEIGHT: 68 IN | HEART RATE: 78 BPM | DIASTOLIC BLOOD PRESSURE: 68 MMHG | BODY MASS INDEX: 41.86 KG/M2 | OXYGEN SATURATION: 94 %

## 2022-03-24 LAB
ALBUMIN SERPL-MCNC: 3.4 G/DL (ref 3.5–5.2)
ALBUMIN/GLOB SERPL: 1.1 G/DL
ALP SERPL-CCNC: 80 U/L (ref 39–117)
ALT SERPL W P-5'-P-CCNC: 35 U/L (ref 1–33)
ANION GAP SERPL CALCULATED.3IONS-SCNC: 10 MMOL/L (ref 5–15)
AST SERPL-CCNC: 48 U/L (ref 1–32)
BASOPHILS # BLD AUTO: 0.1 10*3/MM3 (ref 0–0.2)
BASOPHILS NFR BLD AUTO: 1 % (ref 0–1.5)
BILIRUB SERPL-MCNC: 0.7 MG/DL (ref 0–1.2)
BUN SERPL-MCNC: 8 MG/DL (ref 6–20)
BUN/CREAT SERPL: 10.3 (ref 7–25)
CALCIUM SPEC-SCNC: 8.2 MG/DL (ref 8.6–10.5)
CHLORIDE SERPL-SCNC: 102 MMOL/L (ref 98–107)
CO2 SERPL-SCNC: 23 MMOL/L (ref 22–29)
CREAT SERPL-MCNC: 0.78 MG/DL (ref 0.57–1)
DEPRECATED RDW RBC AUTO: 39.4 FL (ref 37–54)
EGFRCR SERPLBLD CKD-EPI 2021: 104.9 ML/MIN/1.73
EOSINOPHIL # BLD AUTO: 0.1 10*3/MM3 (ref 0–0.4)
EOSINOPHIL NFR BLD AUTO: 1.7 % (ref 0.3–6.2)
ERYTHROCYTE [DISTWIDTH] IN BLOOD BY AUTOMATED COUNT: 13.1 % (ref 12.3–15.4)
GLOBULIN UR ELPH-MCNC: 3 GM/DL
GLUCOSE SERPL-MCNC: 113 MG/DL (ref 65–99)
HCT VFR BLD AUTO: 30 % (ref 34–46.6)
HGB BLD-MCNC: 10.3 G/DL (ref 12–15.9)
LYMPHOCYTES # BLD AUTO: 1.9 10*3/MM3 (ref 0.7–3.1)
LYMPHOCYTES NFR BLD AUTO: 22 % (ref 19.6–45.3)
MAGNESIUM SERPL-MCNC: 1.7 MG/DL (ref 1.6–2.6)
MCH RBC QN AUTO: 29.3 PG (ref 26.6–33)
MCHC RBC AUTO-ENTMCNC: 34.4 G/DL (ref 31.5–35.7)
MCV RBC AUTO: 85.2 FL (ref 79–97)
MONOCYTES # BLD AUTO: 0.5 10*3/MM3 (ref 0.1–0.9)
MONOCYTES NFR BLD AUTO: 5.4 % (ref 5–12)
NEUTROPHILS NFR BLD AUTO: 6.1 10*3/MM3 (ref 1.7–7)
NEUTROPHILS NFR BLD AUTO: 69.9 % (ref 42.7–76)
NRBC BLD AUTO-RTO: 0.1 /100 WBC (ref 0–0.2)
PHOSPHATE SERPL-MCNC: 2.7 MG/DL (ref 2.5–4.5)
PLATELET # BLD AUTO: 326 10*3/MM3 (ref 140–450)
PMV BLD AUTO: 7.2 FL (ref 6–12)
POTASSIUM SERPL-SCNC: 3.7 MMOL/L (ref 3.5–5.2)
PROT SERPL-MCNC: 6.4 G/DL (ref 6–8.5)
RBC # BLD AUTO: 3.52 10*6/MM3 (ref 3.77–5.28)
SODIUM SERPL-SCNC: 135 MMOL/L (ref 136–145)
WBC NRBC COR # BLD: 8.8 10*3/MM3 (ref 3.4–10.8)

## 2022-03-24 PROCEDURE — 85025 COMPLETE CBC W/AUTO DIFF WBC: CPT | Performed by: NURSE PRACTITIONER

## 2022-03-24 PROCEDURE — 71045 X-RAY EXAM CHEST 1 VIEW: CPT

## 2022-03-24 PROCEDURE — 80053 COMPREHEN METABOLIC PANEL: CPT | Performed by: NURSE PRACTITIONER

## 2022-03-24 PROCEDURE — 84100 ASSAY OF PHOSPHORUS: CPT | Performed by: NURSE PRACTITIONER

## 2022-03-24 PROCEDURE — 99024 POSTOP FOLLOW-UP VISIT: CPT | Performed by: SURGERY

## 2022-03-24 PROCEDURE — 25010000002 ENOXAPARIN PER 10 MG: Performed by: SURGERY

## 2022-03-24 PROCEDURE — 83735 ASSAY OF MAGNESIUM: CPT | Performed by: NURSE PRACTITIONER

## 2022-03-24 RX ORDER — HYDROMORPHONE HYDROCHLORIDE 2 MG/1
2 TABLET ORAL EVERY 6 HOURS PRN
Qty: 16 TABLET | Refills: 0 | Status: SHIPPED | OUTPATIENT
Start: 2022-03-24 | End: 2022-03-26 | Stop reason: SDUPTHER

## 2022-03-24 RX ORDER — OMEPRAZOLE 40 MG/1
40 CAPSULE, DELAYED RELEASE ORAL DAILY
Qty: 30 CAPSULE | Refills: 5 | Status: SHIPPED | OUTPATIENT
Start: 2022-03-24 | End: 2022-09-19

## 2022-03-24 RX ORDER — SUCRALFATE 1 G/1
1 TABLET ORAL 2 TIMES DAILY
Qty: 42 TABLET | Refills: 1 | Status: SHIPPED | OUTPATIENT
Start: 2022-03-24 | End: 2022-09-19

## 2022-03-24 RX ORDER — ONDANSETRON 8 MG/1
8 TABLET, ORALLY DISINTEGRATING ORAL EVERY 8 HOURS PRN
Qty: 30 TABLET | Refills: 0 | Status: SHIPPED | OUTPATIENT
Start: 2022-03-24 | End: 2022-04-06

## 2022-03-24 RX ADMIN — TOPIRAMATE 50 MG: 25 TABLET, FILM COATED ORAL at 08:14

## 2022-03-24 RX ADMIN — ENOXAPARIN SODIUM 40 MG: 40 INJECTION SUBCUTANEOUS at 08:14

## 2022-03-24 RX ADMIN — BUSPIRONE HYDROCHLORIDE 10 MG: 5 TABLET ORAL at 08:14

## 2022-03-24 RX ADMIN — ESCITALOPRAM OXALATE 20 MG: 10 TABLET ORAL at 08:14

## 2022-03-24 RX ADMIN — PANTOPRAZOLE SODIUM 40 MG: 40 TABLET, DELAYED RELEASE ORAL at 06:46

## 2022-03-24 RX ADMIN — HYDROMORPHONE HYDROCHLORIDE 2 MG: 2 TABLET ORAL at 06:48

## 2022-03-24 RX ADMIN — SODIUM CHLORIDE, POTASSIUM CHLORIDE, SODIUM LACTATE AND CALCIUM CHLORIDE 75 ML/HR: 600; 310; 30; 20 INJECTION, SOLUTION INTRAVENOUS at 00:06

## 2022-03-24 NOTE — PAYOR COMM NOTE
"Discharge notification for case# [id number]    -----------    THANK YOU,    ESTHER Somers, RN  Utilization Review  [facility]  Phone: [phone]  Fax: [phone]      NPI: [id number]  Tax ID: [id number]    Chico Barnes (32 y.o. Female)             Date of Birth   1990    Social Security Number       Address   [address] [place] [address] [place] [facility]    Home Phone   [phone]    MRN   [id number]       [facility]    Marital Status   Single                            Admission Date   3/22/22    Admission Type   Elective    Admitting Provider   Luna Keyes MD    Attending Provider       Department, Room/Bed   [facility] SURGICAL INPATIENT, 4128/1       Discharge Date   3/24/2022    Discharge Disposition   Home or Self Care    Discharge Destination   Home                            Attending Provider: (none)   Allergies: No Known Allergies    Isolation: None   Infection: COVID (History) (03/22/22)   Code Status: CPR   Advance Care Planning Activity    Ht: 172.7 cm (68\")   Wt: 125 kg (276 lb 3.2 oz)    Admission Cmt: None   Principal Problem: Obesity, Class II, BMI 35-39.9 [E66.9] More...                 Active Insurance as of 3/22/2022     Primary Coverage     Payor Plan Insurance Group Employer/Plan Group    [id number]     Payor Plan Address Payor Plan Phone Number Payor Plan Fax Number Effective Dates    MAIL STOP:   6/9/2020 - None Entered    [address] [address]       [facility] [address]       Subscriber Name Subscriber Birth Date Member ID       CHICO BARNES 1990 [id number]                 Emergency Contacts      (Rel.) Home Phone Work Phone Mobile Phone    IVON BLACKBURN (Mother) -- -- [phone]    MCKINLEY HORAN (Significant Other) [phone] -- [phone]               Operative/Procedure Notes (last 7 days)      Luna Keyes MD at 03/22/22 0754          GASTRIC BYPASS LAPAROSCOPIC WITH " DAVINCI ROBOT  Procedure Report    Patient Name:  Gwen Barnes  YOB: 1990    Date of Surgery:  3/22/2022     Indications:  This patient presents for elective laparoscopic Edwar-en-Y divided gastric bypass. The patient has undergone our extensive preoperative evaluation, teaching and consent process.     Pre-op Diagnosis:   Obesity, Class II, BMI 35-39.9 [E66.9]       Post-Op Diagnosis Codes:     * Obesity, Class II, BMI 35-39.9 [E66.9]    Procedure/CPT® Codes:      Procedure(s):  REVISION GASTRIC BYPASS LAPAROSCOPIC WITH DAVINCI ROBOT    Staff:  Surgeon(s):  Luna Keyes MD    Assistant: Rosetta Paredes RNFA  Assistant: Rosetta Paredes RNFA was responsible for performing the following activities: Retraction, Suction, Irrigation, and Suturing and their skilled assistance was necessary for the success of this case.    Anesthesia: General    Estimated Blood Loss: minimal    Implants:    Implant Name Type Inv. Item Serial No.  Lot No. LRB No. Used Action   DEV CLS WND VLOC/PBT AL NONABS 1/2CIR SZ2/0 27MM 15CM PAULINO - [id number] Implant DEV CLS WND VLOC/PBT AL NONABS 1/2CIR SZ2/0 27MM 15CM PAULINO  [facility] [id number] N/A 3 Implanted   RELOAD STPLR SUREFORM 60 DAVINCI/X/XI 6ROW 2.5 WHT 1P/U - [id number] Implant RELOAD STPLR SUREFORM 60 DAVINCI/X/XI 6ROW 2.5 WHT 1P/U  INTUIT[employer] [id number] N/A 4 Implanted   RELOAD STPLR SUREFORM 60 DAVINCI/X/XI 6ROW 3.5 PAULINO 1P/U - [id number] Implant RELOAD STPLR SUREFORM 60 DAVINCI/X/XI 6ROW 3.5 PAULINO 1P/U  INTUITIVE SURGICAL [id number] N/A 2 Implanted   SEAL FIBRIN TISSEEL FZ 4ML - [id number] Implant SEAL FIBRIN TISSEEL FZ 4ML  [employer] [id number] N/A 1 Implanted   DEV CLS WND VLOC/180 AL ABS 1/2CIR SZ3/0 15CM 26MM GRN - [id number] Implant DEV CLS WND VLOC/180 AL ABS 1/2CIR SZ3/0 15CM 26MM GRN  C[employer]EN [id number] N/A 1 Implanted   RELOAD STPLR SUREFORM 60 DAVINCI/X/XI 6ROW 3.5 PAULINO 1P/U - [id number] Implant RELOAD STPLR SUREFORM 60 DAVINCI/X/XI  6ROW 3.5 PAULINO 1P/U  INTUITIVE SURGICAL [id number] N/A 3 Implanted       Specimen:          None        Findings:  No hiatal hernia seen    Complications:  none  Description of Procedure:  The patient was brought to the operating room and placed in the supine position on the operating room table. The patient had previously received  IV antibiotics as well as SCD. The patient underwent uneventful general endotracheal anesthesia per the Anesthesiology staff. The abdomen was prepped with ChloraPrep and draped in the usual sterile fashion. An Ioban was used as well if not allergic.     A 1.5 cm transverse incision was made to the left of midline 21 cm inferior to the xiphoid process and the peritoneal cavity entered under direct camera visualization using a 5 mm 0° laparoscope and an 8 mm Optiview trocar. The abdomen was insufflated to a pressure of 15-16 mmHg with C02 gas. A 10 mm angled laparoscope was then used.     Exploratory laparoscopy revealed no evidence of injury from the entrance technique and otherwise no abnormalities unless addendum dictated below. Under direct camera visualization, a 12 mm trocar was placed in the right lateral subcostal position and a 12 mm trocar and an 8 mm trocar was placed in the left lateral subcostal position.  A small incision was made in the subxiphoid region for the liver retractor.  A Seb retractor was used.      A 40 Armenian orogastric tube was placed in the stomach for decompression.       I then chose an area along the lesser curve of the stomach approximately 6 centimeters distally to the GE junction and began dissection with the vessel sealer.  I entered the retrogastric space and then placed a  60 mm blue load in this area for a horizontal staple line.   I did use 1 additional horizontal firing for the very last few millimeters of the horizontal staple line.  A 40 Armenian bougie was placed into the gastric pouch and she did have some redundant fundus but only a slight  amount and I did 2 vertical firings of a blue load to trim this extra gastric pouch.     I divided the omentum with the vessel sealer starting with the area near the transverse colon and worked my way superiorly to make way for the Edwar limb.      I then identified the ligament of Treitz and ran the bowel distally 70 cm and brought this loop up to the gastric pouch.  I used a 2-0 silk suture to becca this point and then used another 2-0 silk suture about 6 cm proximal to this so that I would be able to maintain my orientation.    During this process I did have some difficulty with range of motion due to external arm collisions of the robot and I did notice 2 very small deserosalization's of the small bowel that were each closed with 2-0 silk suture.       I then performed the gastrojejunostomy.  With the proximal biliary limb on the left side and the distal alimentary limb on the right side, I performed a linear gastrojejunostomy by making a gastrotomy in the gastric pouch on the posterior side near the staple line in the midline of this region and then also made an enterotomy in the jejunum.  I used a 60 mm [place] stapler with a blue load and made the staple line 30 mm.  I was then able to inspect the staple line and there was no bleeding.  I then closed the enterotomy with a running 3-0 absorbable V-Loc suture by starting the suture on the left side and sewed to the right side.  At this point I decided to perform a leak test.    ICG was injected in the orogastric tube and there was a very slight amount of ICG fluid seen on the right side posteriorly and I oversewed this with a 3-0 absorbable V-Loc suture.  Subsequent views with firefly did not show any evidence of extravasation.      I used a white load and fired it across the jejunum just to the left of the gastrojejunostomy and then at that point the limb on the patient's left side was the biliopancreatic limb.        After this I ran the Edwar limb distally 130  cm and then performed a side-to-side anastomosis with a white loads using the 60 stapler using a triple staple technique.  The common enterotomy was stapled off.  I also placed a 2-0 silk on the left side of the crotch of the anastomosis for support.      I then closed the mesenteric defect at the jejunojejunostomy with a running 2-0 nonabsorbable V-Loc and I also closed Soriano space with a running 2-0 nonabsorbable V-Loc suture.      Once again I inspected the gastric pouch and performed another leak test and there was no evidence of leak.     Tisseal was sprayed over the gastrojejunostomy and also the mesentery between the distal biliary limb and the gastrojejunostomy.  The liver retractor was removed.     Then the abdomen was desufflated and all the trochars were removed and the drain was secured to the skin with 2-0 silk suture.  All the skin incisions were closed with 4-0 Monocryl and surgical glue.        Luna Keyes MD     Date: 3/22/2022  Time: 10:15 EDT    Electronically signed by Luna Keyes MD at 03/22/22 1038          Discharge Summary      Romi Euceda APRN at 03/24/22 1327     Attestation signed by Antonino Payne MD at 03/24/22 1352    I have reviewed this documentation and agree.          This document has been electronically signed by Antonino Payne MD on March 24, 2022 13:52 EDT               Summary:pod 2 gastric bypass                Discharge Summary    Patient name: Gwen Barnes    Medical record number: [id number]    Admission date: 3/22/2022  Discharge date:      Attending physician: Dr. Luna Keyes, Dr. Antonino Payne     Primary care physician: Gadiel Barrett MD    Referring physician: Luna Keyes MD  [address] [place], [place] [id number]    Condition on discharge: Stable    Primary Diagnoses:  Morbid obesity with co-morbidities    Operative Procedure: Robotic Laparoscopic sleeve gastrectomy- gastric bypass revision      Gwen Barnes  is post op day  "two status post procedure listed. Patient denies shortness of air and lower extremity pain. Feels better than yesterday. No vomiting nasuea this am. Ambulating well and using incentive spirometer.   Pt's abdominal pain has improved overall with the use of the abdominal binder. She has been tolerating po fluids well and has drank almost 32 oz of water today.         /68 (BP Location: Right arm, Patient Position: Lying)   Pulse 78   Temp 97.9 °F (36.6 °C) (Oral)   Resp 15   Ht 172.7 cm (68\")   Wt 125 kg (276 lb 3.2 oz)   LMP  (Approximate) Comment: Pt states had some bleeding \"around 3 days ago.\"  SpO2 94%   BMI 42.00 kg/m²     General:  alert, appears stated age and cooperative   Abdomen: soft, bowel sounds active, appropriate tenderness   Incision:   healing well, no drainage, no erythema, no hernia, no seroma, no swelling, no dehiscence, incision well approximated   Heart: Regular rate   Lungs: Clear to auscultation bilaterally     I reviewed the patient's new clinical results. Labs and vitals reviewed and stable. Leukocytosis resolved     Lab Results (last 24 hours)     Procedure Component Value Units Date/Time    Comprehensive Metabolic Panel [[id number]]  (Abnormal) Collected: 03/24/22 0856    Specimen: Blood Updated: 03/24/22 0921     Glucose 113 mg/dL      BUN 8 mg/dL      Creatinine 0.78 mg/dL      Sodium 135 mmol/L      Potassium 3.7 mmol/L      Chloride 102 mmol/L      CO2 23.0 mmol/L      Calcium 8.2 mg/dL      Total Protein 6.4 g/dL      Albumin 3.40 g/dL      ALT (SGPT) 35 U/L      AST (SGOT) 48 U/L      Alkaline Phosphatase 80 U/L      Total Bilirubin 0.7 mg/dL      Globulin 3.0 gm/dL      A/G Ratio 1.1 g/dL      BUN/Creatinine Ratio 10.3     Anion Gap 10.0 mmol/L      eGFR 104.9 mL/min/1.73      Comment: National Kidney Foundation and American Society of Nephrology (ASN) Task Force recommended calculation based on the Chronic Kidney Disease Epidemiology Collaboration (CKD-EPI) equation " refit without adjustment for race.       Narrative:      GFR Normal >60  Chronic Kidney Disease <60  Kidney Failure <15      Phosphorus [[id number]]  (Normal) Collected: 03/24/22 0856    Specimen: Blood Updated: 03/24/22 0921     Phosphorus 2.7 mg/dL     Magnesium [[id number]]  (Normal) Collected: 03/24/22 0856    Specimen: Blood Updated: 03/24/22 0921     Magnesium 1.7 mg/dL     CBC & Differential [[id number]]  (Abnormal) Collected: 03/24/22 0856    Specimen: Blood Updated: 03/24/22 0907    Narrative:      The following orders were created for panel order CBC & Differential.  Procedure                               Abnormality         Status                     ---------                               -----------         ------                     CBC Auto Differential[[id number]]        Abnormal            Final result                 Please view results for these tests on the individual orders.    CBC Auto Differential [[id number]]  (Abnormal) Collected: 03/24/22 0856    Specimen: Blood Updated: 03/24/22 0907     WBC 8.80 10*3/mm3      RBC 3.52 10*6/mm3      Hemoglobin 10.3 g/dL      Hematocrit 30.0 %      MCV 85.2 fL      MCH 29.3 pg      MCHC 34.4 g/dL      RDW 13.1 %      RDW-SD 39.4 fl      MPV 7.2 fL      Platelets 326 10*3/mm3      Neutrophil % 69.9 %      Lymphocyte % 22.0 %      Monocyte % 5.4 %      Eosinophil % 1.7 %      Basophil % 1.0 %      Neutrophils, Absolute 6.10 10*3/mm3      Lymphocytes, Absolute 1.90 10*3/mm3      Monocytes, Absolute 0.50 10*3/mm3      Eosinophils, Absolute 0.10 10*3/mm3      Basophils, Absolute 0.10 10*3/mm3      nRBC 0.1 /100 WBC              Assessment:      Doing well postoperatively.  Pt is not having any nausea or vomiting. She is tolerating po fluids well and has drank almost 32 oz of water today. Her abdominal pain has improved with the use of an abdominal binder. Leukocytosis resolved. Labs and vitals reviewed and stable. The chest pressure she was experiencing yesterday  has resolved. No chest pain reported. Pt was complaining of a cough this morning and a chest x ray was completed and was normal. Also no fever or leukocytosis noted. Cough likely from intubation. Dr. Payne has been covering for Dr. Keyes while he is out of town and has physically seen the pt as well. Plan to discharge the pt home now with follow up in office on Monday.      Plan:   1. Continue Stage 1 diet  2. Continue with ambulation and Incentive spirometry  3. Plan for d/c home  4. Prescriptions for Zofran, Lovenox SQ 40 mg bid for 14 days, dilaudid 2 mg every 6 hrs #16, Carafate and omeprazole prescribed for pt today ( Zofran and omeprazole refills verbally called into outpatient pharmacy at [facility])   5. Abdominal binder prescribed and to be applied prior to discharge  6. Signs and symptoms of a leak reviewed with pt today      Hospital Course: The patient is a very pleasant 32 y.o. female that was admitted to the hospital  with morbid obesity underwent a laparoscopic gastric sleeve- gastric bypass revision due to ongoing GERD.  The next day the patient was having some trouble with po fluid intake and was also having some incisional abdominal pain. On POD 2,  the patient was able to tolerate liquids and was ambulating and vital signs and labs were stable. Her abdominal pain had improved with the use of an abdominal binder. Her fluid intake had also improved. The patient is discharged home with follow-up in the bariatric office next week.      Discharge medications:      Discharge Medications      New Medications      Instructions Start Date   enoxaparin 40 MG/0.4ML solution syringe  Commonly known as: Lovenox   40 mg, Subcutaneous, Every 12 Hours Scheduled      HYDROmorphone 2 MG tablet  Commonly known as: Dilaudid   2 mg, Oral, Every 6 Hours PRN         Changes to Medications      Instructions Start Date   sucralfate 1 g tablet  Commonly known as: Carafate  What changed: when to take this   1 g, Oral, 2 Times  Daily         Continue These Medications      Instructions Start Date   busPIRone 10 MG tablet  Commonly known as: BUSPAR   10 mg, Oral, 2 Times Daily      escitalopram 20 MG tablet  Commonly known as: Lexapro   20 mg, Oral, Daily      famotidine 40 MG tablet  Commonly known as: Pepcid   40 mg, Oral, Nightly PRN      multivitamin with minerals tablet tablet   1 tablet, Oral, Daily, Stop 2-1-22 for surgery      omeprazole 40 MG capsule  Commonly known as: priLOSEC   40 mg, Oral, Daily      ondansetron ODT 8 MG disintegrating tablet  Commonly known as: Zofran ODT   8 mg, Translingual, Every 8 Hours PRN      topiramate 50 MG tablet  Commonly known as: Topamax   50 mg, Oral, 2 Times Daily         Stop These Medications    Karol 3-0.03 MG per tablet  Generic drug: drospirenone-ethinyl estradiol        omeprazole 40 mg daily po and Zofran 8 mg every 8 hrs prn prescribed for pt today verbally through [facility] outpatient pharmacy    Discharge instructions:  Per Bariatric manual; per our protocol      Follow-up appointment: Follow up with Dr. Keyes in the office as scheduled.  If not already scheduled call for appointment at [phone]    Electronically signed by Antonino Payne MD at 03/24/22 0890

## 2022-03-24 NOTE — PLAN OF CARE
Goal Outcome Evaluation:              Outcome Evaluation: pt. has had some c/o pain today. She ambulates independently around the unit. Tolerating bairon clears. Her Po intake has improved from yesterday. She should d/c barring any complications.

## 2022-03-24 NOTE — PLAN OF CARE
Goal Outcome Evaluation:  Plan of Care Reviewed With: patient        Progress: improving  Outcome Evaluation: Pt had no complaints or changes in status this shift. Will continue to monitor.

## 2022-03-24 NOTE — DISCHARGE INSTRUCTIONS
GOING HOME AFTER GASTRIC SLEEVE/ GASTRIC BYPASS SURGERY  [facility]: Post-Operative Information/Instructions  Luna Keyes MD  General Patient Instructions for Discharge   - Call Surgeon's office at [phone] for follow-up appointment.    - Be sure you, the patient, have a follow-up appointment to be seen within seven (7) days after discharge. If not, please call [phone] to schedule an appointment. If you are discharged on a Saturday or Sunday, please call Monday to schedule the appointment.  - Contact the Surgeon at [phone] for any questions or concerns, including temperature greater than or equal to 101F, shortness of breath, leg swelling, redness at incision sites, nausea, vomiting, chills, or problems or questions.    - Follow the Gastric Stage 1 Diet    à Clear liquids, room temperature, sugar-free, caffeine-free, non-carbonated, 70 grams of protein, No Straws.  - You may shower. No tub bath for 2 weeks.  - No lifting, pushing, pulling, or tugging >25 pounds for 3 weeks.  - Ambulate every 3 hours while awake minimum for seven (7) days, increase distance daily.  - For the next several weeks, you are at an increased risk for blood clot formation. Therefore, you should walk regularly. You should not sit for prolonged periods of time, more than 45 minutes, without getting up and walking for 5-10 minutes. This includes any car rides, including the drive home from the hospital. If driving any distance greater than 30 miles over the next two (2) weeks, stop every 30-45 minutes and walk for 5-10 minutes each time.  - Continue using Incentive Spirometer and coughing exercises at least every two (2) hours while awake for one week.  - Continue use of CPAP/BIPAP for diagnosis of sleep apnea as directed.  - No driving or operating machinery allowed while taking narcotic (prescription) pain medication, and until you feel comfortable forcefully applying the brakes if needed. (This usually  takes more than 3 days.)    - Make an appointment with your Primary Care Physician within one week post-op to look at your home medications for possible changes or discontinuity.   Medications  - The nurse will provide a list of medications for you to continue at home   - If you received a Lovenox (Enoxaparin) or Apixiban (Eliquis) prescription at pre-op visit with Surgeon, start taking the medicine the morning after discharge unless directed otherwise.    - If you were prescribed Lovenox (Enoxaparin), review the education/teaching material/video with the nurse.    - Take post op pain meds as prescribed as needed.   - Continue Foltx until finished.   - Resume use of Actigall (Ursodiol) one (1) week after surgery if patient still has gallbladder. You should have been given a prescription at your pre-op visit. Contact the office if you do not have the prescription.   - Resume bariatric vitamin regimen as instructed in pre-op education with bariatric coordinator.    - Zegerid or Prilosec OTC (or generic) by mouth once daily for four (4) weeks unless you are already taking a proton pump inhibitor as home medication. Follow dosing instructions on package.   Nausea/Vomiting:  The following are possible causes for nausea/vomiting:  - Drinking too much or too fast.  - Sinus drainage/post nasal drip for allergy sufferers (you may take Sudafed, Claritin, Tylenol Sinus/Allergy, or other decongestants and nose sprays to help with this discomfort).  - Low blood sugar (sweating, shaky, irritable, weakness, dizzy or tunnel-vision) - treatment is to sip 100% fruit juice - no sugar added until symptoms subside.  - Acid in fruit juice - (may dilute with water or avoid).  - Eating or drinking something that is not on clear liquid (stage 1) diet.  Any nausea/vomiting that prohibits you from keeping fluids down for greater than 24 hours requires a call to the surgeon's office.  Urine:  Use your urine color as a guide to determine if you  are drinking enough fluid. The darker the urine, the more fluids you need to drink. Urine should be clear to light yellow if you are getting enough fluid. If you should experience frequency, burning or pain with urination, blood in urine, contact us or your primary care physician for possible UTI (urinary tract infection), which could require antibiotics (liquid preferred).  Bowel Movements:  You may not have a bowel movement for 2-5 days after going home. You may then experience liquid, runny or loose stools for approximately 3-4 weeks following surgery. This would require you to drink even more fluids to prevent dehydration. Some patients may experience constipation, which can be treated with increased fluids, drinking warm liquids, increased activity and the use of a Fleets Enema, Milk of Magnesia, or suppositories. The first couple of bowel movements could be bloody, tarry black or dark maroon in color. This is OK as long as the stool returns to a normal color in 1-2 days. If however, you have frequent or a large amount of bloody or tarry black stools and/or become light-headed or dizzy, you may be bleeding and require urgent attention. Please call us right away.  Abdominal Incisions:  You will have small incisions. Do not scrub incisions, but allow the warm, soapy water to run over the incisions, rinse well, and pat dry. You may use any brand of anti-bacterial soap. Do not use Peroxide or Neosporin type ointments on sites, unless instructed to do so by a surgeon or nurse. Monitor daily for signs/symptoms of infection, which might include: drainage with a foul odor, pain, redness, swelling or heat at the incision sight; fever, body aches and chills. If you suspect infection or have a fever, give us a call.  Pain:  You will be given a prescription for pain medication to control your pain. If you feel the dose is too strong, you may take half the ordered dose, or you may take Tylenol adult liquid per package  instructions for minor pain. Do not take any medications that contain aspirin or aspirin products.  Do not take medications like: Motrin, Aleve, Ibuprofen, Advil, Naproxen, Celebrex, Daypro, Bextra, Meloxicam or other medications commonly used for arthritis or joint pain.  No steroids or cortisone injections. There may be pain, which should improve every few days. Pain should not suddenly get worse or more intense. Pain that suddenly changes and is constant and severe should be called in to the surgeon's office. Any sudden pain in the lower extremities with associated warmth and redness should be called in to the surgeon's office immediately. Do not rub or massage this area, as it could be a blood clot.  Diet:  Remain on the clear liquid diet (stage 1) per your  which includes 70 grams of protein each day, sugar free, non carbonated and no straws. Day 1 is the day of surgery. If you are tolerating the stage 1 diet, you may then proceed to stage 2 diet, as instructed in the . Do not progress to the stage 2 diet if you are having nausea/vomiting. Refer to the Basic Nutrition and Food Principles guide.  Medications:  The nurse will let you know which medications you will need to continue once you go home. Do not take any medications that are extended or time released if you had the gastric bypass procedure, OK to take if you had the gastric sleeve procedure. Large capsules can be opened and diluted with clear liquids. Check with your physician or pharmacist as to which pills may be crushed and which capsules may be opened and diluted safely. Continue taking Foltx as surgeon orders. If you still have your gall bladder and were prescribed Actigall (Ursodiol), you may resume this medication one week after your surgery. You will remain on Actigall (Ursodiol) for approximately 6 months. The dose is 1 pill, 2 times each day for 6 months.  Activity:  Continue your deep breathing and coughing  exercises with your Incentive Spirometer breathing device at least every 3 hours while awake (10 repetitions each time) for one week. May use CPAP. This will help to prevent respiratory problems such as pneumonia. No lifting, pulling or tugging anything over 25 pounds for 3 weeks after surgery. You may shower but no tub baths, hot tubs or swimming for 2 weeks. Moderate walking is recommended every 3 hours while awake minimum, increase distance daily. Further exercise will be discussed at the first post-op visit. No driving or operating machinery allow until off narcotic pain medication and until you feel comfortable forcefully applying the brakes (usually takes 3 or more days). For the next few weeks you are at an increased risk for blood clot formation. Therefore you should walk regularly and you should not sit for prolonged periods of time, more than 45 minutes without getting up and walking for 5-10 minutes. This includes car rides. Including riding home from the hospital. If riding a distance greater than 30 miles over the next 2 weeks stop every 30-45 minutes and walk 5-10 mintues each time. No tanning bed use for 8 weeks after surgery and in general, not recommended due to the increased risk for skin cancer. Incisions will burn/blister very badly with tanning bed use.  Illness:  Your primary care physician should treat general illness such as ear infections, sinus infections, and viral type illnesses, etc. Medications prescribed should be liquid/elixir form when possible, for the first 30 days.  General:  In general, it is recommended that you weigh yourself no more than once per week. Let the weight come off you and concentrate on more important things. Remember the weight was not gained overnight, nor will it be lost overnight. Gastric Bypass/ Gastric Sleeve weight loss will continue over a period of 12-18 months. Do not  yourself according to how others are doing after surgery, as this will cause  unnecessary discouragement.  THE ABOVE ARE GENERAL GUIDELINES TO ASSIST YOU ONCE HOME, IF YOU ARE IN DOUBT, OR YOU HAVE ANY QUESTIONS, CALL US AT THE NUMBERS LISTED BELOW.  IN THE EVENT OF SUDDEN CHEST PAIN, SHORTNESS OF BREATH, OR ANY LIFE THREATENING CONDITION, CALL 911.  Any time you are evaluated or admitted to another facility, please have someone notify the surgeon's office.  Supplements:  70 grams of protein taken EVERY DAY. Remember to drink at least 64 ounces of fluid a day, sipping slowly early on. Increase this amount during the summertime. Sipping slowly will not stretch your new stomach. Drinking too fast or gulping liquids will cause brief discomfort and early could cause staple line disruption (leak). With eating, tiny bites, then chew, chew, chew, and swallow. Lay your fork/spoon down for 2-3 minutes, and then take your next bite. Your pouch will tell you within 1-2 bites if it is going to tolerate what you are eating.   Protein Vendors:  Refer to protein vendors' handout from consult class. You can always find protein drinks at the bariatric office, grocery stores, [employer], drug [employer], [employer], health food stores, and on the Internet. Find one high in protein (15-30 grams per serving) and low carb (less than 18 grams per serving).  Now is a great time to re-read your . Please review specific instructions given to you at discharge by your physician (surgeon).  HOW/WHEN TO CONTACT US:  It is imperative that you contact us with any of the following:    Ÿ fever greater than 101 degrees  Ÿ shortness of breath  Ÿ leg swelling  Ÿ body aches  Ÿ shaking chills  Ÿ nausea and vomitting  Ÿ pain that has worsened  Ÿ redness at incision sites  Ÿ pus or foul smelling drainage from an incision or wound  Ÿ inability to keep fluids down for more than a day  Ÿ any other condition you feel needs our attention.  [facility] - [facility]: [phone] call this number anytime 24 hours a day /  7 days a week.  Teach-back Questions to be answered by the patient prior to discharge.   What complications would prompt you to call your doctor when you return home? _________________    What is the purpose of your prescribed medication? ________________  What are some potential side effects of the medications you will be taking at home? _______________                    Ok to resume protein shakes and clear liquids  Ok to shower- no tub baths / pools or hot tubs for 2 weeks  No lifting > 25 pounds for 3 weeks  Please call the office for increased nausea/ vomiting/ abdominal pain

## 2022-03-24 NOTE — PROGRESS NOTES
"Subjective:       Gwen Barnes  is post op day two status post procedure listed. Patient denies shortness of air and lower extremity pain. Feels better than yesterday. No vomiting/ nasuea this am. Ambulating well and using incentive spirometer.   Pt reports her abdominal pain is slightly improved since yesterday. She has been able to tolerate po fluids better today than yesterday.      Objective:        /74 (BP Location: Right arm, Patient Position: Lying)   Pulse 84   Temp 98.6 °F (37 °C) (Oral)   Resp 16   Ht 172.7 cm (68\")   Wt 125 kg (276 lb 3.2 oz)   LMP  (Approximate) Comment: Pt states had some bleeding \"around 3 days ago.\"  SpO2 90%   BMI 42.00 kg/m²     General:  alert, appears stated age and cooperative   Abdomen: soft, bowel sounds active, appropriate tenderness   Incision:   healing well, no drainage, no erythema, no hernia, no seroma, no swelling, no dehiscence, incision well approximated   Heart: Regular rate   Lungs: Clear to auscultation bilaterally     I reviewed the patient's new clinical results. leukocytosis resolved. Vitals stable    Lab Results (last 24 hours)     Procedure Component Value Units Date/Time    Comprehensive Metabolic Panel [[id number]]  (Abnormal) Collected: 03/24/22 0856    Specimen: Blood Updated: 03/24/22 0921     Glucose 113 mg/dL      BUN 8 mg/dL      Creatinine 0.78 mg/dL      Sodium 135 mmol/L      Potassium 3.7 mmol/L      Chloride 102 mmol/L      CO2 23.0 mmol/L      Calcium 8.2 mg/dL      Total Protein 6.4 g/dL      Albumin 3.40 g/dL      ALT (SGPT) 35 U/L      AST (SGOT) 48 U/L      Alkaline Phosphatase 80 U/L      Total Bilirubin 0.7 mg/dL      Globulin 3.0 gm/dL      A/G Ratio 1.1 g/dL      BUN/Creatinine Ratio 10.3     Anion Gap 10.0 mmol/L      eGFR 104.9 mL/min/1.73      Comment: National Kidney Foundation and American Society of Nephrology (ASN) Task Force recommended calculation based on the Chronic Kidney Disease Epidemiology Collaboration " (CKD-EPI) equation refit without adjustment for race.       Narrative:      GFR Normal >60  Chronic Kidney Disease <60  Kidney Failure <15      Phosphorus [[id number]]  (Normal) Collected: 03/24/22 0856    Specimen: Blood Updated: 03/24/22 0921     Phosphorus 2.7 mg/dL     Magnesium [[id number]]  (Normal) Collected: 03/24/22 0856    Specimen: Blood Updated: 03/24/22 0921     Magnesium 1.7 mg/dL     CBC & Differential [[id number]]  (Abnormal) Collected: 03/24/22 0856    Specimen: Blood Updated: 03/24/22 0907    Narrative:      The following orders were created for panel order CBC & Differential.  Procedure                               Abnormality         Status                     ---------                               -----------         ------                     CBC Auto Differential[[id number]]        Abnormal            Final result                 Please view results for these tests on the individual orders.    CBC Auto Differential [[id number]]  (Abnormal) Collected: 03/24/22 0856    Specimen: Blood Updated: 03/24/22 0907     WBC 8.80 10*3/mm3      RBC 3.52 10*6/mm3      Hemoglobin 10.3 g/dL      Hematocrit 30.0 %      MCV 85.2 fL      MCH 29.3 pg      MCHC 34.4 g/dL      RDW 13.1 %      RDW-SD 39.4 fl      MPV 7.2 fL      Platelets 326 10*3/mm3      Neutrophil % 69.9 %      Lymphocyte % 22.0 %      Monocyte % 5.4 %      Eosinophil % 1.7 %      Basophil % 1.0 %      Neutrophils, Absolute 6.10 10*3/mm3      Lymphocytes, Absolute 1.90 10*3/mm3      Monocytes, Absolute 0.50 10*3/mm3      Eosinophils, Absolute 0.10 10*3/mm3      Basophils, Absolute 0.10 10*3/mm3      nRBC 0.1 /100 WBC              Assessment:      Doing well postoperatively.  Pt is not having any nausea or vomiting. She has been tolerating po fluids better today than yesterday. She has been walking more and using her incentive spirometer. Her abdominal pain has improved slightly since yesterday with the use of the abdominal binder. Pt did report a  new onset of cough since surgery. Her chest pressure that was reported yesterday has resolved. Due to new onset of cough, will check chest x ray and labs before discharge. If labs stable and chest x ray normal and pt tolerating po fluids well, will discharge home later today. Dr. Payne is also seeing this pt for Dr. Keyes while he is out of town.     Addendum:  Chest x ray normal- no sign of pneumonia  Leukocytosis on labs improved.      Plan:   1. Continue clear liquid diet  2. Continue with ambulation and Incentive spirometry, use abdominal binder when ambulating  3. Plan for d/c home later today if tolerating diet  4. Lovenox   5. Will round this afternoon and if pain improved and po fluid intake improved will likely discharge then.   6. Dr. Payne is also seeing this pt while Dr. Keyes is out of town. He has physically seen, evaluated and documented on this pt already.       Romi Euceda APRZEFERINO  [facility]

## 2022-03-24 NOTE — DISCHARGE SUMMARY
"Discharge Summary    Patient name: Gwen Barnes    Medical record number: [id number]    Admission date: 3/22/2022  Discharge date:      Attending physician: Dr. Luna Keyes, Dr. Antonino Payne     Primary care physician: Gadiel Barrett MD    Referring physician: Luna Keyes MD  [address]  [place] [address]    Condition on discharge: Stable    Primary Diagnoses:  Morbid obesity with co-morbidities    Operative Procedure: Robotic Laparoscopic sleeve gastrectomy- gastric bypass revision      Gwen Barnes  is post op day two status post procedure listed. Patient denies shortness of air and lower extremity pain. Feels better than yesterday. No vomiting nasuea this am. Ambulating well and using incentive spirometer.   Pt's abdominal pain has improved overall with the use of the abdominal binder. She has been tolerating po fluids well and has drank almost 32 oz of water today.         /68 (BP Location: Right arm, Patient Position: Lying)   Pulse 78   Temp 97.9 °F (36.6 °C) (Oral)   Resp 15   Ht 172.7 cm (68\")   Wt 125 kg (276 lb 3.2 oz)   LMP  (Approximate) Comment: Pt states had some bleeding \"around 3 days ago.\"  SpO2 94%   BMI 42.00 kg/m²     General:  alert, appears stated age and cooperative   Abdomen: soft, bowel sounds active, appropriate tenderness   Incision:   healing well, no drainage, no erythema, no hernia, no seroma, no swelling, no dehiscence, incision well approximated   Heart: Regular rate   Lungs: Clear to auscultation bilaterally     I reviewed the patient's new clinical results. Labs and vitals reviewed and stable. Leukocytosis resolved     Lab Results (last 24 hours)     Procedure Component Value Units Date/Time    Comprehensive Metabolic Panel [[id number]]  (Abnormal) Collected: 03/24/22 0856    Specimen: Blood Updated: 03/24/22 0921     Glucose 113 mg/dL      BUN 8 mg/dL      Creatinine 0.78 mg/dL      Sodium 135 mmol/L      Potassium 3.7 mmol/L      Chloride " 102 mmol/L      CO2 23.0 mmol/L      Calcium 8.2 mg/dL      Total Protein 6.4 g/dL      Albumin 3.40 g/dL      ALT (SGPT) 35 U/L      AST (SGOT) 48 U/L      Alkaline Phosphatase 80 U/L      Total Bilirubin 0.7 mg/dL      Globulin 3.0 gm/dL      A/G Ratio 1.1 g/dL      BUN/Creatinine Ratio 10.3     Anion Gap 10.0 mmol/L      eGFR 104.9 mL/min/1.73      Comment: National Kidney Foundation and American Society of Nephrology (ASN) Task Force recommended calculation based on the Chronic Kidney Disease Epidemiology Collaboration (CKD-EPI) equation refit without adjustment for race.       Narrative:      GFR Normal >60  Chronic Kidney Disease <60  Kidney Failure <15      Phosphorus [[id number]]  (Normal) Collected: 03/24/22 0856    Specimen: Blood Updated: 03/24/22 0921     Phosphorus 2.7 mg/dL     Magnesium [[id number]]  (Normal) Collected: 03/24/22 0856    Specimen: Blood Updated: 03/24/22 0921     Magnesium 1.7 mg/dL     CBC & Differential [[id number]]  (Abnormal) Collected: 03/24/22 0856    Specimen: Blood Updated: 03/24/22 0907    Narrative:      The following orders were created for panel order CBC & Differential.  Procedure                               Abnormality         Status                     ---------                               -----------         ------                     CBC Auto Differential[[id number]]        Abnormal            Final result                 Please view results for these tests on the individual orders.    CBC Auto Differential [[id number]]  (Abnormal) Collected: 03/24/22 0856    Specimen: Blood Updated: 03/24/22 0907     WBC 8.80 10*3/mm3      RBC 3.52 10*6/mm3      Hemoglobin 10.3 g/dL      Hematocrit 30.0 %      MCV 85.2 fL      MCH 29.3 pg      MCHC 34.4 g/dL      RDW 13.1 %      RDW-SD 39.4 fl      MPV 7.2 fL      Platelets 326 10*3/mm3      Neutrophil % 69.9 %      Lymphocyte % 22.0 %      Monocyte % 5.4 %      Eosinophil % 1.7 %      Basophil % 1.0 %      Neutrophils, Absolute 6.10  10*3/mm3      Lymphocytes, Absolute 1.90 10*3/mm3      Monocytes, Absolute 0.50 10*3/mm3      Eosinophils, Absolute 0.10 10*3/mm3      Basophils, Absolute 0.10 10*3/mm3      nRBC 0.1 /100 WBC              Assessment:      Doing well postoperatively.  Pt is not having any nausea or vomiting. She is tolerating po fluids well and has drank almost 32 oz of water today. Her abdominal pain has improved with the use of an abdominal binder. Leukocytosis resolved. Labs and vitals reviewed and stable. The chest pressure she was experiencing yesterday has resolved. No chest pain reported. Pt was complaining of a cough this morning and a chest x ray was completed and was normal. Also no fever or leukocytosis noted. Cough likely from intubation. Dr. Payne has been covering for Dr. Keyes while he is out of town and has physically seen the pt as well. Plan to discharge the pt home now with follow up in office on Monday.      Plan:   1. Continue Stage 1 diet  2. Continue with ambulation and Incentive spirometry  3. Plan for d/c home  4. Prescriptions for Zofran, Lovenox SQ 40 mg bid for 14 days, dilaudid 2 mg every 6 hrs #16, Carafate and omeprazole prescribed for pt today ( Zofran and omeprazole refills verbally called into outpatient pharmacy at [facility])   5. Abdominal binder prescribed and to be applied prior to discharge  6. Signs and symptoms of a leak reviewed with pt today      Hospital Course: The patient is a very pleasant 32 y.o. female that was admitted to the hospital  with morbid obesity underwent a laparoscopic gastric sleeve- gastric bypass revision due to ongoing GERD.  The next day the patient was having some trouble with po fluid intake and was also having some incisional abdominal pain. On POD 2,  the patient was able to tolerate liquids and was ambulating and vital signs and labs were stable. Her abdominal pain had improved with the use of an abdominal binder. Her fluid intake had also improved. The patient is  discharged home with follow-up in the bariatric office next week.      Discharge medications:      Discharge Medications      New Medications      Instructions Start Date   enoxaparin 40 MG/0.4ML solution syringe  Commonly known as: Lovenox   40 mg, Subcutaneous, Every 12 Hours Scheduled      HYDROmorphone 2 MG tablet  Commonly known as: Dilaudid   2 mg, Oral, Every 6 Hours PRN         Changes to Medications      Instructions Start Date   sucralfate 1 g tablet  Commonly known as: Carafate  What changed: when to take this   1 g, Oral, 2 Times Daily         Continue These Medications      Instructions Start Date   busPIRone 10 MG tablet  Commonly known as: BUSPAR   10 mg, Oral, 2 Times Daily      escitalopram 20 MG tablet  Commonly known as: Lexapro   20 mg, Oral, Daily      famotidine 40 MG tablet  Commonly known as: Pepcid   40 mg, Oral, Nightly PRN      multivitamin with minerals tablet tablet   1 tablet, Oral, Daily, Stop 2-1-22 for surgery      omeprazole 40 MG capsule  Commonly known as: priLOSEC   40 mg, Oral, Daily      ondansetron ODT 8 MG disintegrating tablet  Commonly known as: Zofran ODT   8 mg, Translingual, Every 8 Hours PRN      topiramate 50 MG tablet  Commonly known as: Topamax   50 mg, Oral, 2 Times Daily         Stop These Medications    Karol 3-0.03 MG per tablet  Generic drug: drospirenone-ethinyl estradiol        omeprazole 40 mg daily po and Zofran 8 mg every 8 hrs prn prescribed for pt today verbally through [facility] outpatient pharmacy    Discharge instructions:  Per Bariatric manual; per our protocol      Follow-up appointment: Follow up with Dr. Keyes in the office as scheduled.  If not already scheduled call for appointment at [phone]

## 2022-03-24 NOTE — PROGRESS NOTES
GENERAL SURGERY PROGRESS NOTE  Chief Complaint:  Surgery Follow up   LOS: 2 days       Subjective     Interval History:     Feels fairly good this morning.  Has been working on increasing her fluid intake.  Appears to have consumed considerably more fluid last evening.  Has multiple liquids at bedside.  Has ambulated.  Has passed gas.    Objective     Vital Signs  Temp:  [98 °F (36.7 °C)-98.9 °F (37.2 °C)] 98.6 °F (37 °C)  Heart Rate:  [73-84] 84  Resp:  [16-18] 16  BP: ()/(64-74) 111/74    Physical Exam:   Abdomen soft, incisions healing appropriately  Labs:  Lab Results (last 24 hours)     ** No results found for the last 24 hours. **           Results Review:     Labs and imaging for today were reviewed.    Assessment/Plan     Gwen Barnes is a 32 y.o. female who is status post Edwar-en-Y bypass by Dr. Keyes      If she is able to maintain adequate oral fluid intake then she can be discharged home today.          This document has been electronically signed by Antonino Payne MD on March 24, 2022 08:36 EDT        Antonino Payne MD  03/24/22  08:36 EDT

## 2022-03-25 ENCOUNTER — READMISSION MANAGEMENT (OUTPATIENT)
Dept: CALL CENTER | Facility: HOSPITAL | Age: 32
End: 2022-03-25

## 2022-03-25 NOTE — OUTREACH NOTE
COVID-19 Week 1 Survey    Flowsheet Row Responses   [facility] patient discharged from? [facility]   Does the patient have one of the following disease processes/diagnoses(primary or secondary)? COVID-19   COVID-19 underlying condition? None   Call Number Call 1   Week 1 Call successful? Yes   Call start time [phone]   Discharge diagnosis GASTRIC BYPASS   Meds reviewed with patient/caregiver? Yes   Is the patient having any side effects they believe may be caused by any medication additions or changes? No   Does the patient have all medications ordered at discharge? Yes   Is the patient taking all medications as directed (includes completed medication regime)? Yes   Does the patient have a primary care provider?  Yes   Comments regarding PCP Gadiel Barrett MD   Does the patient have an appointment with their PCP or specialist within 7 days of discharge? Yes   Has the patient kept scheduled appointments due by today? Yes   Has home health visited the patient within 72 hours of discharge? N/A   Psychosocial issues? No   Did the patient receive a copy of their discharge instructions? Yes   Did the patient receive a copy of COVID-19 specific instructions? Yes   Nursing interventions Reviewed instructions with patient   What is the patient's perception of their health status since discharge? Improving   Does the patient have any of the following symptoms? None  [Patient was Covid positive in Feb ]   Pulse Ox monitoring None   Is the patient/caregiver able to teach back steps to recovery at home? Set small, achievable goals for return to baseline health, Rest and rebuild strength, gradually increase activity, Make a list of questions for provider's appointment   If the patient is a current smoker, are they able to teach back resources for cessation? Not a smoker   Is the patient/caregiver able to teach back the hierarchy of who to call/visit for symptoms/problems? PCP, Specialist, Home health nurse, Urgent Care,  ED, 911 Yes   COVID-19 call completed? Yes   Revoked No further contact(revokes)-requires comment   Is the patient interested in additional calls from an ambulatory ?  NOTE:  applies to high risk patients requiring additional follow-up. No   Graduated/Revoked comments Levy tis doing well. No further calls needed.    Wrap up additional comments She is doing well. Sore from surgery but no other symptoms.           DEEDEE TRIPLETT - Registered Nurse

## 2022-03-25 NOTE — SIGNIFICANT NOTE
Case Management Discharge Note      Final Note: (P) home         Selected Continued Care - Discharged on 3/24/2022 Admission date: 3/22/2022 - Discharge disposition: Home or Self Care                          Final Discharge Disposition Code: (P) 01 - home or self-care

## 2022-03-25 NOTE — OUTREACH NOTE
Prep Survey    Flowsheet Row Responses   [facility] patient discharged from? [facility]   Is LACE score < 7 ? Yes   Emergency Room discharge w/ pulse ox? No   Eligibility Readm Mgmt   Discharge diagnosis GASTRIC BYPASS   Does the patient have one of the following disease processes/diagnoses(primary or secondary)? COVID-19   Does the patient have Home health ordered? No   Is there a DME ordered? No   Prep survey completed? Yes          EMERALD TRIPLETT - Registered Nurse

## 2022-03-26 ENCOUNTER — APPOINTMENT (OUTPATIENT)
Dept: CT IMAGING | Facility: HOSPITAL | Age: 32
End: 2022-03-26

## 2022-03-26 ENCOUNTER — TELEPHONE (OUTPATIENT)
Dept: BARIATRICS/WEIGHT MGMT | Facility: CLINIC | Age: 32
End: 2022-03-26

## 2022-03-26 ENCOUNTER — HOSPITAL ENCOUNTER (EMERGENCY)
Facility: HOSPITAL | Age: 32
Discharge: HOME | End: 2022-03-26
Attending: EMERGENCY MEDICINE | Admitting: EMERGENCY MEDICINE

## 2022-03-26 VITALS
OXYGEN SATURATION: 100 % | RESPIRATION RATE: 16 BRPM | TEMPERATURE: 98.5 F | DIASTOLIC BLOOD PRESSURE: 78 MMHG | SYSTOLIC BLOOD PRESSURE: 118 MMHG | HEIGHT: 68 IN | HEART RATE: 72 BPM | BODY MASS INDEX: 41.36 KG/M2 | WEIGHT: 272.93 LBS

## 2022-03-26 DIAGNOSIS — R07.81 PLEURODYNIA: ICD-10-CM

## 2022-03-26 DIAGNOSIS — Z98.84 HISTORY OF GASTRIC BYPASS: Primary | ICD-10-CM

## 2022-03-26 DIAGNOSIS — R10.12 LEFT UPPER QUADRANT ABDOMINAL PAIN: ICD-10-CM

## 2022-03-26 LAB
ALBUMIN SERPL-MCNC: 3.9 G/DL (ref 3.5–5.2)
ALBUMIN/GLOB SERPL: 1.2 G/DL
ALP SERPL-CCNC: 166 U/L (ref 39–117)
ALT SERPL W P-5'-P-CCNC: 29 U/L (ref 1–33)
ANION GAP SERPL CALCULATED.3IONS-SCNC: 15 MMOL/L (ref 5–15)
AST SERPL-CCNC: 25 U/L (ref 1–32)
BASOPHILS # BLD AUTO: 0.1 10*3/MM3 (ref 0–0.2)
BASOPHILS NFR BLD AUTO: 1 % (ref 0–1.5)
BILIRUB SERPL-MCNC: 0.4 MG/DL (ref 0–1.2)
BUN SERPL-MCNC: 14 MG/DL (ref 6–20)
BUN/CREAT SERPL: 19.2 (ref 7–25)
CALCIUM SPEC-SCNC: 8.7 MG/DL (ref 8.6–10.5)
CHLORIDE SERPL-SCNC: 100 MMOL/L (ref 98–107)
CO2 SERPL-SCNC: 19 MMOL/L (ref 22–29)
CREAT SERPL-MCNC: 0.73 MG/DL (ref 0.57–1)
DEPRECATED RDW RBC AUTO: 38.1 FL (ref 37–54)
EGFRCR SERPLBLD CKD-EPI 2021: 113.6 ML/MIN/1.73
EOSINOPHIL # BLD AUTO: 0.1 10*3/MM3 (ref 0–0.4)
EOSINOPHIL NFR BLD AUTO: 1.4 % (ref 0.3–6.2)
ERYTHROCYTE [DISTWIDTH] IN BLOOD BY AUTOMATED COUNT: 13.1 % (ref 12.3–15.4)
GLOBULIN UR ELPH-MCNC: 3.3 GM/DL
GLUCOSE SERPL-MCNC: 76 MG/DL (ref 65–99)
HCT VFR BLD AUTO: 33.2 % (ref 34–46.6)
HGB BLD-MCNC: 11.7 G/DL (ref 12–15.9)
LIPASE SERPL-CCNC: 62 U/L (ref 13–60)
LYMPHOCYTES # BLD AUTO: 1.9 10*3/MM3 (ref 0.7–3.1)
LYMPHOCYTES NFR BLD AUTO: 19.9 % (ref 19.6–45.3)
MCH RBC QN AUTO: 29.1 PG (ref 26.6–33)
MCHC RBC AUTO-ENTMCNC: 35.4 G/DL (ref 31.5–35.7)
MCV RBC AUTO: 82.3 FL (ref 79–97)
MONOCYTES # BLD AUTO: 0.4 10*3/MM3 (ref 0.1–0.9)
MONOCYTES NFR BLD AUTO: 4.2 % (ref 5–12)
NEUTROPHILS NFR BLD AUTO: 7 10*3/MM3 (ref 1.7–7)
NEUTROPHILS NFR BLD AUTO: 73.5 % (ref 42.7–76)
PLATELET # BLD AUTO: 403 10*3/MM3 (ref 140–450)
PMV BLD AUTO: 6.9 FL (ref 6–12)
POTASSIUM SERPL-SCNC: 3.6 MMOL/L (ref 3.5–5.2)
PROT SERPL-MCNC: 7.2 G/DL (ref 6–8.5)
RBC # BLD AUTO: 4.03 10*6/MM3 (ref 3.77–5.28)
SODIUM SERPL-SCNC: 134 MMOL/L (ref 136–145)
WBC NRBC COR # BLD: 9.6 10*3/MM3 (ref 3.4–10.8)

## 2022-03-26 PROCEDURE — 85025 COMPLETE CBC W/AUTO DIFF WBC: CPT | Performed by: EMERGENCY MEDICINE

## 2022-03-26 PROCEDURE — 96374 THER/PROPH/DIAG INJ IV PUSH: CPT

## 2022-03-26 PROCEDURE — 25010000002 DIPHENHYDRAMINE PER 50 MG: Performed by: EMERGENCY MEDICINE

## 2022-03-26 PROCEDURE — 0 IOPAMIDOL PER 1 ML: Performed by: EMERGENCY MEDICINE

## 2022-03-26 PROCEDURE — 96375 TX/PRO/DX INJ NEW DRUG ADDON: CPT

## 2022-03-26 PROCEDURE — 99283 EMERGENCY DEPT VISIT LOW MDM: CPT

## 2022-03-26 PROCEDURE — 80053 COMPREHEN METABOLIC PANEL: CPT | Performed by: EMERGENCY MEDICINE

## 2022-03-26 PROCEDURE — 25010000002 HYDROMORPHONE 1 MG/ML SOLUTION: Performed by: EMERGENCY MEDICINE

## 2022-03-26 PROCEDURE — 74178 CT ABD&PLV WO CNTR FLWD CNTR: CPT

## 2022-03-26 PROCEDURE — 25010000002 ONDANSETRON PER 1 MG: Performed by: EMERGENCY MEDICINE

## 2022-03-26 PROCEDURE — 83690 ASSAY OF LIPASE: CPT | Performed by: EMERGENCY MEDICINE

## 2022-03-26 RX ORDER — DIPHENHYDRAMINE HYDROCHLORIDE 50 MG/ML
25 INJECTION INTRAMUSCULAR; INTRAVENOUS ONCE
Status: COMPLETED | OUTPATIENT
Start: 2022-03-26 | End: 2022-03-26

## 2022-03-26 RX ORDER — ONDANSETRON 2 MG/ML
8 INJECTION INTRAMUSCULAR; INTRAVENOUS ONCE
Status: COMPLETED | OUTPATIENT
Start: 2022-03-26 | End: 2022-03-26

## 2022-03-26 RX ORDER — SODIUM CHLORIDE 0.9 % (FLUSH) 0.9 %
10 SYRINGE (ML) INJECTION AS NEEDED
Status: DISCONTINUED | OUTPATIENT
Start: 2022-03-26 | End: 2022-03-27 | Stop reason: HOSPADM

## 2022-03-26 RX ORDER — HYDROMORPHONE HYDROCHLORIDE 2 MG/1
2 TABLET ORAL EVERY 4 HOURS PRN
Qty: 10 TABLET | Refills: 0 | Status: SHIPPED | OUTPATIENT
Start: 2022-03-26 | End: 2022-09-19

## 2022-03-26 RX ADMIN — HYDROMORPHONE HYDROCHLORIDE 1 MG: 1 INJECTION, SOLUTION INTRAMUSCULAR; INTRAVENOUS; SUBCUTANEOUS at 20:06

## 2022-03-26 RX ADMIN — ONDANSETRON 8 MG: 2 INJECTION INTRAMUSCULAR; INTRAVENOUS at 20:05

## 2022-03-26 RX ADMIN — IOPAMIDOL 100 ML: 755 INJECTION, SOLUTION INTRAVENOUS at 21:11

## 2022-03-26 RX ADMIN — DIPHENHYDRAMINE HYDROCHLORIDE 25 MG: 50 INJECTION INTRAMUSCULAR; INTRAVENOUS at 21:59

## 2022-03-26 NOTE — TELEPHONE ENCOUNTER
"Pt called the on call answering service today (3/26/22) stating she was having increased left middle abdominal pain ( above far left incision). Pt was immediatly called back. Pt stated she was having some pain in this same location immediately after surgery, but the pain improved post discharge and today for the last twelve hours, the pain has come back and gotten worse. Pt also states that the dilaudid pain medication is not helping ease the pain. Pt calls it a \" sharp stabbing pain.\" pt denies nausea/ vomiting. Pt states she recently had a bowel movement and has been able to tolerate po fluids well today. The only associated factor with the left middle abdominal pain is pain / discomfort with breathing. Pt did have some discomfort with deep breathing immediatly post op, but on POD 2, this discomfort had resolved and no leukocytosis was noted on labs. Pt states that same pain she was having is now back. Pt denies fever. Due to increased abdominal pain and also pain/ discomfort with breathing, I recommended pt proceed to the ER for further work up including ct abdomen and pelvis bariatric protocol. Dr. Keyes is on vacation and I called the emergency room at [facility] to inform them that this patient would be coming in later tonight and I would recommend a ct abdomen and pelvis bariatric protocol to rule out leak associated with recent gastric bypass surgery in addition to any other recommendations/ tests based upon their physical assessment.. Dr. Otto is covering for Dr. Keyes for Emergency room consultations and inpatients this weekend. Pt verbalized understanding of these recommendations.    "

## 2022-03-26 NOTE — ED NOTES
Pt reports she had a gastric bypass surgery on Tuesday. Pt reports around 18 hrs age she began having severe LUQ abdominal pain. Pt reports the pain wasn't there initially after her surgery but it began and has continued to get worse.

## 2022-03-26 NOTE — ED PROVIDER NOTES
Subjective   History of Present Illness  Patient is 4 days post gastric bypass after a failed gastric sleeve 3 years ago.  The patient states that she initially had some pain in the left upper quadrant right after surgery but that it improved and then it started again about 18 hours ago.  The pain is sharp and stabbing in the left upper quadrant and it is worse with a deep breath or movement.  The patient denies any vomiting.  No fever or chills.  Review of Systems   Constitutional: Positive for activity change.   Eyes: Negative.    Respiratory: Negative.    Cardiovascular: Negative.    Gastrointestinal: Positive for abdominal pain.   Endocrine: Negative.    Genitourinary: Negative.    Musculoskeletal: Positive for myalgias.   Skin: Negative.    Allergic/Immunologic: Negative.    Neurological: Negative.    Hematological: Negative.    Psychiatric/Behavioral: Positive for dysphoric mood.       Past Medical History:   Diagnosis Date   • Anxiety    • Depression    • Fatigue    • GERD (gastroesophageal reflux disease)    • Heartburn    • Morbid obesity (HCC)    • PCOS (polycystic ovarian syndrome)        No Known Allergies    Past Surgical History:   Procedure Laterality Date   • CHOLECYSTECTOMY N/A 11/5/2019    Procedure: CHOLECYSTECTOMY LAPAROSCOPIC;  Surgeon: Luna Keyes MD;  Location: [facility] OR;  Service: General   • DILATATION AND CURETTAGE  2014 & 2019   • ENDOSCOPY     • ENDOSCOPY N/A 11/4/2021    Procedure: ESOPHAGOGASTRODUODENOSCOPY with biopsy x 2 areas;  Surgeon: Luna Keyes MD;  Location: [facility] ENDOSCOPY;  Service: General;  Laterality: N/A;  post op: esophagitis   • GASTRIC BYPASS N/A 3/22/2022    Procedure: REVISION GASTRIC BYPASS LAPAROSCOPIC WITH DAVINCI ROBOT;  Surgeon: Luna Keyes MD;  Location: [facility] MAIN OR;  Service: Robotics - DaVinci;  Laterality: N/A;   • GASTRIC SLEEVE LAPAROSCOPIC N/A 8/6/2019    Procedure: laparoscopic sleeve gastrectomy;  Surgeon: Luna Keyes MD;  Location: [facility]  MAIN OR;  Service: Bariatric   • TONSILLECTOMY AND ADENOIDECTOMY  2002       Family History   Problem Relation Age of Onset   • Obesity Sister    • Diabetes type II Maternal Grandmother    • Alzheimer's disease Maternal Grandmother    • Lung cancer Maternal Grandfather    • Lung cancer Paternal Grandmother        Social History     Socioeconomic History   • Marital status: Single   Tobacco Use   • Smoking status: Never Smoker   • Smokeless tobacco: Never Used   Vaping Use   • Vaping Use: Never used   Substance and Sexual Activity   • Alcohol use: Not Currently   • Drug use: No   • Sexual activity: Defer           Objective   Physical Exam  Patient is awake and alert she is afebrile vital signs are stable the HEENT exam shows no scleral icterus mucous membranes are moist her chest is clear cardiovascular exam reveals a regular rhythm her abdomen is obese she has the recent incisions from her robotic surgery which are healing well.  She has tenderness noted in the left upper quadrant along the costal margin.  No crepitance was noted.  There is no hernia there is no lower abdominal tenderness she has no cyanosis clubbing or edema  Procedures           ED Course           Results for orders placed or performed during the hospital encounter of 03/26/22   Comprehensive Metabolic Panel    Specimen: Blood   Result Value Ref Range    Glucose 76 65 - 99 mg/dL    BUN 14 6 - 20 mg/dL    Creatinine 0.73 0.57 - 1.00 mg/dL    Sodium 134 (L) 136 - 145 mmol/L    Potassium 3.6 3.5 - 5.2 mmol/L    Chloride 100 98 - 107 mmol/L    CO2 19.0 (L) 22.0 - 29.0 mmol/L    Calcium 8.7 8.6 - 10.5 mg/dL    Total Protein 7.2 6.0 - 8.5 g/dL    Albumin 3.90 3.50 - 5.20 g/dL    ALT (SGPT) 29 1 - 33 U/L    AST (SGOT) 25 1 - 32 U/L    Alkaline Phosphatase 166 (H) 39 - 117 U/L    Total Bilirubin 0.4 0.0 - 1.2 mg/dL    Globulin 3.3 gm/dL    A/G Ratio 1.2 g/dL    BUN/Creatinine Ratio 19.2 7.0 - 25.0    Anion Gap 15.0 5.0 - 15.0 mmol/L    eGFR 113.6 >60.0  mL/min/1.73   Lipase    Specimen: Blood   Result Value Ref Range    Lipase 62 (H) 13 - 60 U/L   CBC Auto Differential    Specimen: Blood   Result Value Ref Range    WBC 9.60 3.40 - 10.80 10*3/mm3    RBC 4.03 3.77 - 5.28 10*6/mm3    Hemoglobin 11.7 (L) 12.0 - 15.9 g/dL    Hematocrit 33.2 (L) 34.0 - 46.6 %    MCV 82.3 79.0 - 97.0 fL    MCH 29.1 26.6 - 33.0 pg    MCHC 35.4 31.5 - 35.7 g/dL    RDW 13.1 12.3 - 15.4 %    RDW-SD 38.1 37.0 - 54.0 fl    MPV 6.9 6.0 - 12.0 fL    Platelets 403 140 - 450 10*3/mm3    Neutrophil % 73.5 42.7 - 76.0 %    Lymphocyte % 19.9 19.6 - 45.3 %    Monocyte % 4.2 (L) 5.0 - 12.0 %    Eosinophil % 1.4 0.3 - 6.2 %    Basophil % 1.0 0.0 - 1.5 %    Neutrophils, Absolute 7.00 1.70 - 7.00 10*3/mm3    Lymphocytes, Absolute 1.90 0.70 - 3.10 10*3/mm3    Monocytes, Absolute 0.40 0.10 - 0.90 10*3/mm3    Eosinophils, Absolute 0.10 0.00 - 0.40 10*3/mm3    Basophils, Absolute 0.10 0.00 - 0.20 10*3/mm3     Medications   sodium chloride 0.9 % flush 10 mL (has no administration in time range)   ondansetron (ZOFRAN) injection 8 mg (8 mg Intravenous Given 3/26/22 2005)   HYDROmorphone (DILAUDID) injection 1 mg (1 mg Intravenous Given 3/26/22 2006)   iopamidol (ISOVUE-370) 76 % injection 100 mL (100 mL Intravenous Given 3/26/22 2111)     CT Abd With & Without Contrast Pelvis With Contrast    Result Date: 3/26/2022  Impression: Expected post surgical changes without identified complication. Electronically signed by:  Stone Chandra M.D.  3/26/2022 7:35 PM                                          MDM  The patient's blood work was unremarkable but the patient had a CT scan that shows some collection of fluid with a small effusion in the left base which is most likely the etiology of her pleural dyspnea.  I do not see any evidence of abnormality related to the surgery below the diaphragm.  Blood work was unremarkable.  The patient was given a dose of Dilaudid as well as some Benadryl and Zofran.  The patient will be  given an extra day of Dilaudid to use for the pain as her current dose is not controlling the pain.  She has an appointment to see her doctor in 2 days.  Final diagnoses:   History of gastric bypass   Left upper quadrant abdominal pain   Pleurodynia       ED Disposition  ED Disposition     ED Disposition   Discharge    Condition   Stable    Comment   --             Luna Keyes MD  [address] [place] [facility]  [phone]    Go to   Neck scheduled appointment on Monday         Medication List      No changes were made to your prescriptions during this visit.          Haresh Silva MD  03/26/22 7092

## 2022-03-27 NOTE — DISCHARGE INSTRUCTIONS
Take 1 Dilaudid every 4 hours or 2 every 6 for your pain.  Follow-up with Dr. Keyes on Monday as scheduled

## 2022-03-28 ENCOUNTER — OFFICE VISIT (OUTPATIENT)
Dept: BARIATRICS/WEIGHT MGMT | Facility: CLINIC | Age: 32
End: 2022-03-28

## 2022-03-28 VITALS
HEART RATE: 88 BPM | RESPIRATION RATE: 18 BRPM | OXYGEN SATURATION: 98 % | DIASTOLIC BLOOD PRESSURE: 77 MMHG | SYSTOLIC BLOOD PRESSURE: 112 MMHG | HEIGHT: 68 IN | WEIGHT: 268.2 LBS | BODY MASS INDEX: 40.65 KG/M2

## 2022-03-28 DIAGNOSIS — Z51.89 VISIT FOR WOUND CHECK: ICD-10-CM

## 2022-03-28 DIAGNOSIS — E66.01 OBESITY, CLASS III, BMI 40-49.9 (MORBID OBESITY): Primary | ICD-10-CM

## 2022-03-28 PROCEDURE — 99024 POSTOP FOLLOW-UP VISIT: CPT | Performed by: NURSE PRACTITIONER

## 2022-03-28 NOTE — PROGRESS NOTES
[facility] BAR SURG [facility] BARIATRIC SURGERY  [address] [place] [address]  [address] [place] [address]  Dept: [phone]  3/28/2022      Gwen Barnes.  [id number]  [id number]  1990  female      Chief Complaint   Patient presents with   • Post-op     1 wk Bypass       BH Post-Op Bariatric Surgery:     HPI:   Weight loss in the last week:4 pounds     Wt Readings from Last 10 Encounters:   03/28/22 122 kg (268 lb 3.2 oz)   03/26/22 124 kg (272 lb 14.9 oz)   03/22/22 125 kg (276 lb 3.2 oz)   02/21/22 127 kg (279 lb 3.2 oz)   01/17/22 124 kg (274 lb 3.2 oz)   11/04/21 119 kg (262 lb 5.6 oz)   10/20/21 117 kg (257 lb)   09/15/21 114 kg (252 lb 6.4 oz)   08/12/21 113 kg (250 lb 3.2 oz)   08/02/21 115 kg (254 lb 6.4 oz)   premier protein shake, gaterade 0, water , sugar free popsicles, jello , no nausea or vomiting or GERD       Review of Systems   Constitutional: Positive for activity change, appetite change and fatigue.   Respiratory: Negative.    Cardiovascular: Negative.    Gastrointestinal: Positive for abdominal pain.   Musculoskeletal: Positive for myalgias.     No dysphagia no nausea no vomiting  Chest pressure with deep breathing improved also  Abdominal pain improved- 4/10 today  No extremity pain or swelling  Has had a bowel movement since surgery    Patient Active Problem List   Diagnosis   • Obesity, Class III, BMI 40-49.9 (morbid obesity) ([facility])   • Body mass index 45.0-49.9, adult ([facility])   • Encounter for other preprocedural examination   • Heartburn   • Malaise and fatigue   • Polycystic ovaries   • Morbid (severe) obesity due to excess calories ([facility])   • Gastroesophageal reflux disease   • Moderately severe depression ([facility])   • Generalized anxiety disorder   • Morbid obesity ([facility])   • Obesity, Class II, BMI 35-39.9       The following portions of the patient's history were reviewed and updated as appropriate: allergies, current medications,  past family history, past medical history, past social history, past surgical history, and problem list.    Vitals:    03/28/22 1128   BP: 112/77   Pulse: 88   Resp: 18   SpO2: 98%       Physical Exam  Constitutional:       Appearance: Normal appearance. She is obese.   Cardiovascular:      Rate and Rhythm: Normal rate and regular rhythm.   Pulmonary:      Effort: Pulmonary effort is normal.      Breath sounds: Normal breath sounds.   Abdominal:      General: Abdomen is flat. Bowel sounds are normal.      Palpations: Abdomen is soft.      Comments: Incisions clean/ dry and intact    Skin:     General: Skin is warm and dry.   Neurological:      General: No focal deficit present.      Mental Status: She is alert and oriented to person, place, and time.   Psychiatric:         Mood and Affect: Mood normal.         Behavior: Behavior normal.         Thought Content: Thought content normal.         Judgment: Judgment normal.       Awake and alert  Normal pulmonary effort  Incisions with no erythema and appropriate abdominal tenderness  Extremities with no tenderness and no swelling    Assessment:   Post-op, the patient is doing well. Pt called over the weekend on Saturday and stated she was having increased left sided abdominal pain to the point the pain medication was not helping. She was also having some shortness of breath with taking deep breaths.  I recommended she proceed to the ER for further work up and bariatric protocol CT. Pt went to the ER and had the CT scan which was negative for a leak. Today, her abdominal pain is improved. She is not having any nausea or vomiting. She is tolerating po fluids well. She was given a refill of dilaudid from the ER physician which she is yet to get refilled. Plan encouraged to call office if her abdominal pain increases again. Plan to follow up at 1 month apt with labs then. Diet progression reviewed and handout given.     Plan:   Reviewed with patient the importance of  following the manual for diet progression. Increase activity as tolerated. Continue increasing daily intake of protein and water.   Return to work: the patient is to return to 3 weeks from their surgery date with no restrictions unless they develop medical problems in which we will see them back in the office. They received a note in our office today with their return to work date.  Activity restrictions: no lifting, pushing or pulling over 25lbs for 3 weeks.   Recommended patient be sure to get at least 70 grams of protein per day. Discussed with the patient the recommended amount of water per day to intake. Reviewed vitamin requirements. Be sure to do routine exercise and increase activity as tolerated. No asa, nsaids or steroids for 8 weeks. Patient may use miralax as needed if necessary.     Instructions / Recommendations: dietary counseling recommended, recommended a daily protein intake of  grams, vitamin supplement(s) recommended, recommended exercising at least 150 minutes per week, behavior modifications recommended and instructed to call the office for concerns, questions, or problems.     The patient was instructed to follow up at one month follow up appt.     The patient was counseled regarding post op bariatric manual    LA Epps  [facility] Bariatrics and General Surgery

## 2022-04-01 DIAGNOSIS — G89.18 POST-OPERATIVE PAIN: Primary | ICD-10-CM

## 2022-04-01 RX ORDER — OXYCODONE AND ACETAMINOPHEN 10; 325 MG/1; MG/1
1 TABLET ORAL EVERY 8 HOURS PRN
Qty: 20 TABLET | Refills: 0 | Status: SHIPPED | OUTPATIENT
Start: 2022-04-01 | End: 2022-09-19

## 2022-04-06 ENCOUNTER — APPOINTMENT (OUTPATIENT)
Dept: CT IMAGING | Facility: HOSPITAL | Age: 32
End: 2022-04-06

## 2022-04-06 ENCOUNTER — HOSPITAL ENCOUNTER (EMERGENCY)
Facility: HOSPITAL | Age: 32
Discharge: HOME | End: 2022-04-06
Attending: EMERGENCY MEDICINE | Admitting: EMERGENCY MEDICINE

## 2022-04-06 VITALS
DIASTOLIC BLOOD PRESSURE: 72 MMHG | RESPIRATION RATE: 16 BRPM | OXYGEN SATURATION: 97 % | BODY MASS INDEX: 40.48 KG/M2 | HEART RATE: 85 BPM | TEMPERATURE: 98.9 F | SYSTOLIC BLOOD PRESSURE: 103 MMHG | WEIGHT: 257.94 LBS | HEIGHT: 67 IN

## 2022-04-06 DIAGNOSIS — R10.84 GENERALIZED ABDOMINAL PAIN: Primary | ICD-10-CM

## 2022-04-06 DIAGNOSIS — N39.0 ACUTE UTI: ICD-10-CM

## 2022-04-06 DIAGNOSIS — E87.6 HYPOKALEMIA: ICD-10-CM

## 2022-04-06 LAB
ALBUMIN SERPL-MCNC: 4.4 G/DL (ref 3.5–5.2)
ALBUMIN/GLOB SERPL: 1.2 G/DL
ALP SERPL-CCNC: 142 U/L (ref 39–117)
ALT SERPL W P-5'-P-CCNC: 40 U/L (ref 1–33)
ANION GAP SERPL CALCULATED.3IONS-SCNC: 18 MMOL/L (ref 5–15)
AST SERPL-CCNC: 30 U/L (ref 1–32)
BACTERIA UR QL AUTO: ABNORMAL /HPF
BASOPHILS # BLD AUTO: 0.1 10*3/MM3 (ref 0–0.2)
BASOPHILS NFR BLD AUTO: 0.7 % (ref 0–1.5)
BILIRUB SERPL-MCNC: 0.4 MG/DL (ref 0–1.2)
BILIRUB UR QL STRIP: NEGATIVE
BUN SERPL-MCNC: 13 MG/DL (ref 6–20)
BUN/CREAT SERPL: 14.6 (ref 7–25)
CALCIUM SPEC-SCNC: 9.4 MG/DL (ref 8.6–10.5)
CHLORIDE SERPL-SCNC: 96 MMOL/L (ref 98–107)
CLARITY UR: CLEAR
CO2 SERPL-SCNC: 22 MMOL/L (ref 22–29)
COLOR UR: YELLOW
CREAT SERPL-MCNC: 0.89 MG/DL (ref 0.57–1)
DEPRECATED RDW RBC AUTO: 38.5 FL (ref 37–54)
EGFRCR SERPLBLD CKD-EPI 2021: 89.6 ML/MIN/1.73
EOSINOPHIL # BLD AUTO: 0.1 10*3/MM3 (ref 0–0.4)
EOSINOPHIL NFR BLD AUTO: 0.8 % (ref 0.3–6.2)
ERYTHROCYTE [DISTWIDTH] IN BLOOD BY AUTOMATED COUNT: 13.4 % (ref 12.3–15.4)
GLOBULIN UR ELPH-MCNC: 3.8 GM/DL
GLUCOSE SERPL-MCNC: 90 MG/DL (ref 65–99)
GLUCOSE UR STRIP-MCNC: NEGATIVE MG/DL
HCG SERPL QL: NEGATIVE
HCT VFR BLD AUTO: 39.7 % (ref 34–46.6)
HGB BLD-MCNC: 14.1 G/DL (ref 12–15.9)
HGB UR QL STRIP.AUTO: NEGATIVE
HOLD SPECIMEN: NORMAL
HOLD SPECIMEN: NORMAL
HYALINE CASTS UR QL AUTO: ABNORMAL /LPF
KETONES UR QL STRIP: ABNORMAL
LEUKOCYTE ESTERASE UR QL STRIP.AUTO: ABNORMAL
LIPASE SERPL-CCNC: 83 U/L (ref 13–60)
LYMPHOCYTES # BLD AUTO: 2.3 10*3/MM3 (ref 0.7–3.1)
LYMPHOCYTES NFR BLD AUTO: 24.9 % (ref 19.6–45.3)
MCH RBC QN AUTO: 28.7 PG (ref 26.6–33)
MCHC RBC AUTO-ENTMCNC: 35.5 G/DL (ref 31.5–35.7)
MCV RBC AUTO: 80.8 FL (ref 79–97)
MONOCYTES # BLD AUTO: 0.4 10*3/MM3 (ref 0.1–0.9)
MONOCYTES NFR BLD AUTO: 4.8 % (ref 5–12)
NEUTROPHILS NFR BLD AUTO: 6.4 10*3/MM3 (ref 1.7–7)
NEUTROPHILS NFR BLD AUTO: 68.8 % (ref 42.7–76)
NITRITE UR QL STRIP: NEGATIVE
NRBC BLD AUTO-RTO: 0 /100 WBC (ref 0–0.2)
PH UR STRIP.AUTO: <=5 [PH] (ref 5–8)
PLATELET # BLD AUTO: 555 10*3/MM3 (ref 140–450)
PMV BLD AUTO: 7.4 FL (ref 6–12)
POTASSIUM SERPL-SCNC: 3.4 MMOL/L (ref 3.5–5.2)
PROT SERPL-MCNC: 8.2 G/DL (ref 6–8.5)
PROT UR QL STRIP: ABNORMAL
RBC # BLD AUTO: 4.92 10*6/MM3 (ref 3.77–5.28)
RBC # UR STRIP: ABNORMAL /HPF
REF LAB TEST METHOD: ABNORMAL
SODIUM SERPL-SCNC: 136 MMOL/L (ref 136–145)
SP GR UR STRIP: >=1.099 (ref 1–1.03)
SQUAMOUS #/AREA URNS HPF: ABNORMAL /HPF
UROBILINOGEN UR QL STRIP: ABNORMAL
WBC # UR STRIP: ABNORMAL /HPF
WBC NRBC COR # BLD: 9.2 10*3/MM3 (ref 3.4–10.8)
WHOLE BLOOD HOLD SPECIMEN: NORMAL

## 2022-04-06 PROCEDURE — 84703 CHORIONIC GONADOTROPIN ASSAY: CPT | Performed by: PHYSICIAN ASSISTANT

## 2022-04-06 PROCEDURE — 80053 COMPREHEN METABOLIC PANEL: CPT | Performed by: PHYSICIAN ASSISTANT

## 2022-04-06 PROCEDURE — 81001 URINALYSIS AUTO W/SCOPE: CPT | Performed by: PHYSICIAN ASSISTANT

## 2022-04-06 PROCEDURE — 25010000002 ONDANSETRON PER 1 MG: Performed by: PHYSICIAN ASSISTANT

## 2022-04-06 PROCEDURE — 0 IOPAMIDOL PER 1 ML: Performed by: EMERGENCY MEDICINE

## 2022-04-06 PROCEDURE — 0 MORPHINE SULFATE 4 MG/ML SOLUTION: Performed by: PHYSICIAN ASSISTANT

## 2022-04-06 PROCEDURE — 25010000002 KETOROLAC TROMETHAMINE PER 15 MG: Performed by: PHYSICIAN ASSISTANT

## 2022-04-06 PROCEDURE — 83690 ASSAY OF LIPASE: CPT | Performed by: PHYSICIAN ASSISTANT

## 2022-04-06 PROCEDURE — 96375 TX/PRO/DX INJ NEW DRUG ADDON: CPT

## 2022-04-06 PROCEDURE — 99283 EMERGENCY DEPT VISIT LOW MDM: CPT

## 2022-04-06 PROCEDURE — 96374 THER/PROPH/DIAG INJ IV PUSH: CPT

## 2022-04-06 PROCEDURE — 74178 CT ABD&PLV WO CNTR FLWD CNTR: CPT

## 2022-04-06 PROCEDURE — 85025 COMPLETE CBC W/AUTO DIFF WBC: CPT | Performed by: PHYSICIAN ASSISTANT

## 2022-04-06 RX ORDER — MORPHINE SULFATE 4 MG/ML
4 INJECTION, SOLUTION INTRAMUSCULAR; INTRAVENOUS ONCE
Status: COMPLETED | OUTPATIENT
Start: 2022-04-06 | End: 2022-04-06

## 2022-04-06 RX ORDER — SODIUM CHLORIDE 0.9 % (FLUSH) 0.9 %
10 SYRINGE (ML) INJECTION AS NEEDED
Status: DISCONTINUED | OUTPATIENT
Start: 2022-04-06 | End: 2022-04-07 | Stop reason: HOSPADM

## 2022-04-06 RX ORDER — POTASSIUM CHLORIDE 20 MEQ/1
20 TABLET, EXTENDED RELEASE ORAL ONCE
Status: COMPLETED | OUTPATIENT
Start: 2022-04-06 | End: 2022-04-06

## 2022-04-06 RX ORDER — CEFDINIR 300 MG/1
300 CAPSULE ORAL ONCE
Status: COMPLETED | OUTPATIENT
Start: 2022-04-06 | End: 2022-04-06

## 2022-04-06 RX ORDER — ONDANSETRON 2 MG/ML
4 INJECTION INTRAMUSCULAR; INTRAVENOUS ONCE
Status: COMPLETED | OUTPATIENT
Start: 2022-04-06 | End: 2022-04-06

## 2022-04-06 RX ORDER — KETOROLAC TROMETHAMINE 30 MG/ML
30 INJECTION, SOLUTION INTRAMUSCULAR; INTRAVENOUS ONCE
Status: COMPLETED | OUTPATIENT
Start: 2022-04-06 | End: 2022-04-06

## 2022-04-06 RX ORDER — ONDANSETRON 4 MG/1
4 TABLET, ORALLY DISINTEGRATING ORAL EVERY 6 HOURS PRN
Qty: 30 TABLET | Refills: 0 | Status: SHIPPED | OUTPATIENT
Start: 2022-04-06

## 2022-04-06 RX ORDER — CEFDINIR 300 MG/1
300 CAPSULE ORAL 2 TIMES DAILY
Qty: 14 CAPSULE | Refills: 0 | Status: SHIPPED | OUTPATIENT
Start: 2022-04-06 | End: 2022-07-14

## 2022-04-06 RX ADMIN — CEFDINIR 300 MG: 300 CAPSULE ORAL at 23:34

## 2022-04-06 RX ADMIN — KETOROLAC TROMETHAMINE 30 MG: 30 INJECTION, SOLUTION INTRAMUSCULAR; INTRAVENOUS at 19:21

## 2022-04-06 RX ADMIN — MORPHINE SULFATE 4 MG: 4 INJECTION INTRAVENOUS at 23:33

## 2022-04-06 RX ADMIN — IOPAMIDOL 100 ML: 755 INJECTION, SOLUTION INTRAVENOUS at 20:52

## 2022-04-06 RX ADMIN — POTASSIUM CHLORIDE 20 MEQ: 20 TABLET, EXTENDED RELEASE ORAL at 22:31

## 2022-04-06 RX ADMIN — ONDANSETRON 4 MG: 2 INJECTION INTRAMUSCULAR; INTRAVENOUS at 19:21

## 2022-04-06 NOTE — ED PROVIDER NOTES
Subjective     Patient is a 32-year-old female comes in complaining of generalized abdominal pain worse on the left side than right but describes it as all over.  Patient states pain is about an 8 out of 10 and nothing seems to make it better or worse.  Patient reports some nausea but denies any vomiting.  Patient states she has been able to tolerate little amounts of food and water.  Patient states that her last bowel movement was earlier today but only a little amount out.  Patient denies any diarrhea since her surgery.  Patient states that she has used her home pain medicine sparingly.  Patient states her pain is nonradiating.  Patient denies any urinary symptoms, fever, chills, cough, diarrhea, chest pain or shortness of breath.  Patient also voices that she is very anxious and just wants to be sure that things are okay after her surgery.        Review of Systems   Constitutional: Negative for chills, fatigue and fever.   HENT: Negative for congestion, sore throat, tinnitus and trouble swallowing.    Eyes: Negative for photophobia, discharge and visual disturbance.   Respiratory: Negative for cough and shortness of breath.    Cardiovascular: Negative for chest pain and leg swelling.   Gastrointestinal: Positive for abdominal pain and nausea. Negative for blood in stool, diarrhea and vomiting.   Genitourinary: Negative for dysuria, flank pain, frequency and urgency.   Musculoskeletal: Negative for arthralgias and myalgias.   Skin: Negative for rash.   Neurological: Negative for dizziness, syncope, light-headedness and headaches.   Psychiatric/Behavioral: Negative for confusion.       Past Medical History:   Diagnosis Date   • Anxiety    • Depression    • Fatigue    • GERD (gastroesophageal reflux disease)    • Heartburn    • Morbid obesity (HCC)    • PCOS (polycystic ovarian syndrome)        No Known Allergies    Past Surgical History:   Procedure Laterality Date   • CHOLECYSTECTOMY N/A 11/5/2019    Procedure:  CHOLECYSTECTOMY LAPAROSCOPIC;  Surgeon: Luna Keyes MD;  Location: [facility] MAIN OR;  Service: General   • DILATATION AND CURETTAGE  2014 & 2019   • ENDOSCOPY     • ENDOSCOPY N/A 11/4/2021    Procedure: ESOPHAGOGASTRODUODENOSCOPY with biopsy x 2 areas;  Surgeon: Luna Keyes MD;  Location: [facility] ENDOSCOPY;  Service: General;  Laterality: N/A;  post op: esophagitis   • GASTRIC BYPASS N/A 3/22/2022    Procedure: REVISION GASTRIC BYPASS LAPAROSCOPIC WITH DAVINCI ROBOT;  Surgeon: Luna Keyes MD;  Location: [facility] MAIN OR;  Service: Robotics - DaVinci;  Laterality: N/A;   • GASTRIC SLEEVE LAPAROSCOPIC N/A 8/6/2019    Procedure: laparoscopic sleeve gastrectomy;  Surgeon: Luna Keyes MD;  Location: [facility] MAIN OR;  Service: Bariatric   • TONSILLECTOMY AND ADENOIDECTOMY  2002       Family History   Problem Relation Age of Onset   • Obesity Sister    • Diabetes type II Maternal Grandmother    • Alzheimer's disease Maternal Grandmother    • Lung cancer Maternal Grandfather    • Lung cancer Paternal Grandmother        Social History     Socioeconomic History   • Marital status: Single   Tobacco Use   • Smoking status: Never Smoker   • Smokeless tobacco: Never Used   Vaping Use   • Vaping Use: Never used   Substance and Sexual Activity   • Alcohol use: Not Currently   • Drug use: No   • Sexual activity: Defer           Objective   Physical Exam  Vitals and nursing note reviewed.   Constitutional:       General: She is not in acute distress.     Appearance: She is well-developed. She is not diaphoretic.   HENT:      Head: Normocephalic and atraumatic.      Nose: Nose normal.      Mouth/Throat:      Pharynx: No oropharyngeal exudate.   Eyes:      Extraocular Movements: Extraocular movements intact.      Conjunctiva/sclera: Conjunctivae normal.      Pupils: Pupils are equal, round, and reactive to light.   Cardiovascular:      Rate and Rhythm: Normal rate and regular rhythm.      Heart sounds: Normal heart sounds.      Comments:  "S1, S2 audible.  Pulmonary:      Effort: Pulmonary effort is normal. No respiratory distress.      Breath sounds: Normal breath sounds. No wheezing.      Comments: On room air.  Abdominal:      General: Bowel sounds are normal. There is no distension.      Palpations: Abdomen is soft.      Tenderness: There is generalized abdominal tenderness (mild). There is no right CVA tenderness, left CVA tenderness, guarding or rebound. Negative signs include Reilly's sign.   Genitourinary:     Comments: Deferred  exam  Musculoskeletal:         General: No tenderness or deformity. Normal range of motion.      Cervical back: Normal range of motion.   Skin:     General: Skin is warm.      Capillary Refill: Capillary refill takes less than 2 seconds.      Findings: No erythema or rash.   Neurological:      Mental Status: She is alert and oriented to person, place, and time.      Cranial Nerves: No cranial nerve deficit.   Psychiatric:         Mood and Affect: Mood normal.         Behavior: Behavior normal.         Procedures           ED Course  ED Course as of 04/06/22 2327 Wed Apr 06, 2022   2251 Platelets(!): 555 [RL]      ED Course User Index  [RL] Sudhir Ma PA      /57   Pulse 91   Temp 98.9 °F (37.2 °C) (Oral)   Resp 15   Ht 170.2 cm (67\")   Wt 117 kg (257 lb 15 oz)   LMP 03/20/2022 (Approximate)   SpO2 98%   BMI 40.40 kg/m²   Labs Reviewed   COMPREHENSIVE METABOLIC PANEL - Abnormal; Notable for the following components:       Result Value    Potassium 3.4 (*)     Chloride 96 (*)     ALT (SGPT) 40 (*)     Alkaline Phosphatase 142 (*)     Anion Gap 18.0 (*)     All other components within normal limits    Narrative:     GFR Normal >60  Chronic Kidney Disease <60  Kidney Failure <15     LIPASE - Abnormal; Notable for the following components:    Lipase 83 (*)     All other components within normal limits   URINALYSIS W/ CULTURE IF INDICATED - Abnormal; Notable for the following components:    Specific " Gravity, UA >=1.099 (*)     Ketones, UA 15 mg/dL (1+) (*)     Protein, UA Trace (*)     Leuk Esterase, UA Trace (*)     All other components within normal limits   CBC WITH AUTO DIFFERENTIAL - Abnormal; Notable for the following components:    Platelets 555 (*)     Monocyte % 4.8 (*)     All other components within normal limits   URINALYSIS, MICROSCOPIC ONLY - Abnormal; Notable for the following components:    WBC, UA 3-5 (*)     Bacteria, UA Trace (*)     Squamous Epithelial Cells, UA 7-12 (*)     All other components within normal limits   HCG, SERUM, QUALITATIVE - Normal   RAINBOW DRAW    Narrative:     The following orders were created for panel order [place] Draw.  Procedure                               Abnormality         Status                     ---------                               -----------         ------                     Green Top (Gel)[[id number]]                                  Final result               Lavender Top[[id number]]                                     Final result               Gold Top - SST[[id number]]                                   Final result               Light Blue Top[[id number]]                                   Final result                 Please view results for these tests on the individual orders.   GREEN TOP   LAVENDER TOP   GOLD TOP - SST   LIGHT BLUE TOP   CBC AND DIFFERENTIAL    Narrative:     The following orders were created for panel order CBC & Differential.  Procedure                               Abnormality         Status                     ---------                               -----------         ------                     CBC Auto Differential[[id number]]        Abnormal            Final result                 Please view results for these tests on the individual orders.     CT Abd With & Without Contrast Pelvis With Contrast    Result Date: 4/6/2022  1.Postsurgical changes with gastrojejunostomy. No definite acute complication is seen at the  anastomosis on this exam. 2.Small amount of free fluid in the pelvis. 3.9 cm low-density structure in the right adnexa represents an interval change. This may represent an ovarian cyst but a developing pelvic abscess is not completely excluded by imaging. Correlate with clinical signs and symptoms and lab values. 3. No other definite acute abnormality is seen at this time. 4.Improving postoperative changes in the subcutaneous tissues of the anterior abdomen.  Electronically Signed By-Stone Leigh MD On:4/6/2022 9:14 PM This report was finalized on [id number] by  Stone Leigh MD.                                               [facility]     Chart review: Last CT scan showed no acute findings on 3/26/2022.  Patient follows with Dr. Keyes with general surgery.    EKG:  Not indicated    Imaging:    CT Abd With & Without Contrast Pelvis With Contrast   Final Result   1.Postsurgical changes with gastrojejunostomy. No definite acute   complication is seen at the anastomosis on this exam.   2.Small amount of free fluid in the pelvis. 3.9 cm low-density structure   in the right adnexa represents an interval change. This may represent an   ovarian cyst but a developing pelvic abscess is not completely excluded   by imaging. Correlate with clinical signs and symptoms and lab values.   3. No other definite acute abnormality is seen at this time.   4.Improving postoperative changes in the subcutaneous tissues of the   anterior abdomen.       Electronically Signed By-Stone Leigh MD On:4/6/2022 9:14 PM   This report was finalized on [id number] by  Stone Leigh MD.          Labs: Potassium slightly low at 3.4 otherwise largely unremarkable CBC and CMP.  Platelets slightly elevated at 555, patient has run high in the past.  UA shows trace leukocyte Estrace, 1+ ketones, trace bacteria and 3-5 WBCs and small 7-12 squamous epithelial cells indicating likely somewhat contaminated specimen.    Vitals:  /57   Pulse 91   Temp  "98.9 °F (37.2 °C) (Oral)   Resp 15   Ht 170.2 cm (67\")   Wt 117 kg (257 lb 15 oz)   LMP 03/20/2022 (Approximate)   SpO2 98%   BMI 40.40 kg/m²       Medications given:    Medications   sodium chloride 0.9 % flush 10 mL (has no administration in time range)   sodium chloride 0.9 % flush 10 mL (has no administration in time range)   Morphine sulfate (PF) injection 4 mg (has no administration in time range)   cefdinir (OMNICEF) capsule 300 mg (has no administration in time range)   ondansetron (ZOFRAN) injection 4 mg (4 mg Intravenous Given 4/6/22 1921)   ketorolac (TORADOL) injection 30 mg (30 mg Intravenous Given 4/6/22 1921)   iopamidol (ISOVUE-370) 76 % injection 100 mL (100 mL Intravenous Given 4/6/22 2052)   potassium chloride (K-DUR,KLOR-CON) CR tablet 20 mEq (20 mEq Oral Given 4/6/22 2231)       Procedures:    MDM: Patient is a 32-year-old female comes in complaining of abdominal pain s/p Edwar-en-Y surgery with Dr. Keyes about 2 weeks ago.  Potassium slightly low at 3.4 otherwise largely unremarkable CBC and CMP.  Platelets slightly elevated at 555, patient has run high in the past.  UA shows trace leukocyte Estrace, 1+ ketones, trace bacteria and 3-5 WBCs and small 7-12 squamous epithelial cells indicating likely somewhat contaminated specimen.  CT abdomen pelvis shows postsurgical changes gastric jejunostomy.  No definite acute complication is seen at the anastomosis on this exam.  Small amount of free fluid in the pelvis.  3.9 cm low-density structure in the right adnexa represents an interval change.  This may represent an ovarian cyst but a developing pelvic abscess is not completely excluded by imaging.  Patient is found to not have pelvic abscess at this time as patient was minimally tender on exam, afebrile and normal white blood cell count at this time.  Patient was given potassium replacement here in the ER.  Patient was given Zofran and Toradol with some improvement of pain by patient reports " that the pain came back.  Patient is given morphine for further pain control.  I think there is a component of anxiety also contributing to patient's symptoms.  Patient given Omnicef and sent home on a 7-day course of this.  See full discharge instructions for further details.  Results and plan discussed with patient and is agreeable with plan.    Final diagnoses:   Generalized abdominal pain   Hypokalemia   Acute UTI       ED Disposition  ED Disposition     ED Disposition   Discharge    Condition   Stable    Comment   --             [facility] EMERGENCY DEPARTMENT  [address] [facility] [id number] [phone]  Go in 1 day  As needed, If symptoms worsen    Gadiel Barrett MD  [address] [facility] [place] [address]  [phone]    Call in 1 week  As needed    Luna Keyes MD  [address] [place] [address]  [phone]    Call in 1 day  to schedule follow up appointment         Medication List      New Prescriptions    cefdinir 300 MG capsule  Commonly known as: OMNICEF  Take 1 capsule by mouth 2 (Two) Times a Day.     ondansetron ODT 4 MG disintegrating tablet  Commonly known as: ZOFRAN-ODT  Place 1 tablet on the tongue Every 6 (Six) Hours As Needed for Nausea or Vomiting.           Where to Get Your Medications      These medications were sent to [facility] - [place], [place] - [address] [facility] - [phone]  - [phone] FX  [address] [facility] [place] [address]    Phone: [phone]   · cefdinir 300 MG capsule  · ondansetron ODT 4 MG disintegrating tablet          Sudhir Ma PA  04/06/22 5417

## 2022-04-07 NOTE — DISCHARGE INSTRUCTIONS
Please take antibiotic to completion.  Please follow-up with Dr. Keyes by giving a call in the morning to schedule follow-up appointment.  Please come back to the ER if you spike a high fever or have severe pains you will need reevaluation that time.  Please take Zofran as needed for nausea and vomiting.

## 2022-04-11 DIAGNOSIS — G89.18 POST-OPERATIVE PAIN: Primary | ICD-10-CM

## 2022-04-11 RX ORDER — HYDROCODONE BITARTRATE AND ACETAMINOPHEN 5; 325 MG/1; MG/1
1 TABLET ORAL EVERY 8 HOURS PRN
Qty: 10 TABLET | Refills: 0 | Status: SHIPPED | OUTPATIENT
Start: 2022-04-11 | End: 2022-09-19

## 2022-04-21 DIAGNOSIS — G89.18 POST-OPERATIVE PAIN: ICD-10-CM

## 2022-04-21 RX ORDER — HYDROCODONE BITARTRATE AND ACETAMINOPHEN 5; 325 MG/1; MG/1
1 TABLET ORAL EVERY 8 HOURS PRN
Qty: 10 TABLET | Refills: 0 | OUTPATIENT
Start: 2022-04-21

## 2022-04-21 NOTE — TELEPHONE ENCOUNTER
Pt is several weeks post gastric bypass surgery. If she needs a further refill, she will need to come in for an apt. She should not be needing this medication this far out from surgery

## 2022-04-28 ENCOUNTER — OFFICE VISIT (OUTPATIENT)
Dept: PSYCHIATRY | Facility: CLINIC | Age: 32
End: 2022-04-28

## 2022-04-28 VITALS
SYSTOLIC BLOOD PRESSURE: 111 MMHG | HEIGHT: 67 IN | DIASTOLIC BLOOD PRESSURE: 71 MMHG | BODY MASS INDEX: 40.4 KG/M2 | OXYGEN SATURATION: 97 % | HEART RATE: 70 BPM

## 2022-04-28 DIAGNOSIS — E66.01 OBESITY, CLASS III, BMI 40-49.9 (MORBID OBESITY): Primary | ICD-10-CM

## 2022-04-28 DIAGNOSIS — F41.1 GENERALIZED ANXIETY DISORDER: Chronic | ICD-10-CM

## 2022-04-28 DIAGNOSIS — F33.41 RECURRENT MAJOR DEPRESSIVE DISORDER, IN PARTIAL REMISSION: Primary | Chronic | ICD-10-CM

## 2022-04-28 PROCEDURE — 99214 OFFICE O/P EST MOD 30 MIN: CPT

## 2022-04-28 RX ORDER — ESCITALOPRAM OXALATE 5 MG/1
TABLET ORAL
Qty: 42 TABLET | Refills: 0 | Status: SHIPPED | OUTPATIENT
Start: 2022-04-28 | End: 2022-05-19

## 2022-04-28 RX ORDER — BUSPIRONE HYDROCHLORIDE 10 MG/1
10 TABLET ORAL 2 TIMES DAILY
Qty: 180 TABLET | Refills: 1 | Status: SHIPPED | OUTPATIENT
Start: 2022-04-28 | End: 2022-10-20 | Stop reason: SDUPTHER

## 2022-04-28 RX ORDER — DESVENLAFAXINE 25 MG/1
25 TABLET, EXTENDED RELEASE ORAL EVERY MORNING
Qty: 14 TABLET | Refills: 0 | Status: SHIPPED | OUTPATIENT
Start: 2022-04-28 | End: 2022-07-14 | Stop reason: DRUGHIGH

## 2022-04-28 RX ORDER — DESVENLAFAXINE SUCCINATE 50 MG/1
50 TABLET, EXTENDED RELEASE ORAL EVERY MORNING
Qty: 30 TABLET | Refills: 2 | Status: SHIPPED | OUTPATIENT
Start: 2022-05-12 | End: 2022-07-14 | Stop reason: DRUGHIGH

## 2022-04-29 ENCOUNTER — OFFICE VISIT (OUTPATIENT)
Dept: BARIATRICS/WEIGHT MGMT | Facility: CLINIC | Age: 32
End: 2022-04-29

## 2022-04-29 VITALS
HEIGHT: 67 IN | BODY MASS INDEX: 39.68 KG/M2 | OXYGEN SATURATION: 97 % | DIASTOLIC BLOOD PRESSURE: 74 MMHG | SYSTOLIC BLOOD PRESSURE: 110 MMHG | RESPIRATION RATE: 16 BRPM | HEART RATE: 73 BPM | WEIGHT: 252.8 LBS

## 2022-04-29 PROCEDURE — 99024 POSTOP FOLLOW-UP VISIT: CPT | Performed by: SURGERY

## 2022-04-29 RX ORDER — OMEPRAZOLE 20 MG/1
20 TABLET, DELAYED RELEASE ORAL DAILY
Qty: 30 TABLET | Refills: 2 | Status: SHIPPED | OUTPATIENT
Start: 2022-04-29 | End: 2022-09-19

## 2022-04-29 NOTE — PROGRESS NOTES
[facility] BAR SURG [facility] BARIATRIC SURGERY  [address] [place] [address]  [address] [place] [address]  Dept: [phone]  4/29/2022      Gwen Barnes.  [id number]  [id number]  1990  female      Chief Complaint   Patient presents with   • Post-op     1 mo Bypass       BH Post-Op Bariatric Surgery:   Gwen Barnes is status post procedure listed above  HPI: s/p revision of gastric sleeve to gastric bypass 3/22. She is doing well. No diarrhea.     Wt Readings from Last 10 Encounters:   04/29/22 115 kg (252 lb 12.8 oz)   04/06/22 117 kg (257 lb 15 oz)   03/28/22 122 kg (268 lb 3.2 oz)   03/26/22 124 kg (272 lb 14.9 oz)   03/22/22 125 kg (276 lb 3.2 oz)   02/21/22 127 kg (279 lb 3.2 oz)   01/17/22 124 kg (274 lb 3.2 oz)   11/04/21 119 kg (262 lb 5.6 oz)   10/20/21 117 kg (257 lb)   09/15/21 114 kg (252 lb 6.4 oz)      Diet: premer protien in morning, canned chicken for lunch, mashed potatoes twice, soup,       Review of Systems    Patient Active Problem List   Diagnosis   • Obesity, Class III, BMI 40-49.9 (morbid obesity) (HCC)   • Body mass index 45.0-49.9, adult ([facility])   • Encounter for other preprocedural examination   • Heartburn   • Malaise and fatigue   • Polycystic ovaries   • Morbid (severe) obesity due to excess calories (HCC)   • Gastroesophageal reflux disease   • Moderately severe depression   • Generalized anxiety disorder   • Morbid obesity (HCC)   • Obesity, Class II, BMI 35-39.9       Past Medical History:   Diagnosis Date   • Anxiety    • Depression    • Fatigue    • GERD (gastroesophageal reflux disease)    • Heartburn    • Morbid obesity (HCC)    • PCOS (polycystic ovarian syndrome)        The following portions of the patient's history were reviewed and updated as appropriate: allergies, current medications, past family history, past medical history, past social history, past surgical history and problem list.    Vitals:    04/29/22  1411   BP: 110/74   Pulse: 73   Resp: 16   SpO2: 97%       Physical Exam  Awake and alert  Normal mental status  Normal pulmonary effort  Abdomen appropriate tenderness  Incisions no erythema  Extremities no tenderness or swelling      Assessment:   Post-op, the patient is doing great.     Plan:     Encouraged patient to be sure to get plenty of lean protein per day through small frequent meals all with a protein source.   Activity restrictions: none.   Recommended patient be sure to get at least 70 grams of protein per day by eating small, frequent meals all with high lean protein choices. Be sure to limit/cut back on daily carbohydrate intake. Discussed with the patient the recommended amount of water per day to intake- half of body weight in ounces. Reviewed vitamin requirements. Be sure to do routine exercise, 150 minutes per week minimum, including both cardio and strength training.     Instructions / Recommendations: dietary counseling recommended, recommended a daily protein intake of  grams, vitamin supplement(s) recommended, recommended exercising at least 150 minutes per week, behavior modifications recommended and instructed to call the office for concerns, questions, or problems.     The patient was instructed to follow up in 3 months .     The patient was counseled regarding. Total time spent face to face was 15 minutes and 15 minutes was spent counseling.

## 2022-07-11 ENCOUNTER — TELEPHONE (OUTPATIENT)
Dept: BARIATRICS/WEIGHT MGMT | Facility: CLINIC | Age: 32
End: 2022-07-11

## 2022-07-14 ENCOUNTER — OFFICE VISIT (OUTPATIENT)
Dept: PSYCHIATRY | Facility: CLINIC | Age: 32
End: 2022-07-14

## 2022-07-14 VITALS
HEART RATE: 80 BPM | OXYGEN SATURATION: 99 % | DIASTOLIC BLOOD PRESSURE: 72 MMHG | WEIGHT: 244.8 LBS | SYSTOLIC BLOOD PRESSURE: 122 MMHG | BODY MASS INDEX: 38.34 KG/M2

## 2022-07-14 DIAGNOSIS — F33.41 RECURRENT MAJOR DEPRESSIVE DISORDER, IN PARTIAL REMISSION: Primary | Chronic | ICD-10-CM

## 2022-07-14 DIAGNOSIS — F41.1 GENERALIZED ANXIETY DISORDER: Chronic | ICD-10-CM

## 2022-07-14 PROCEDURE — 99214 OFFICE O/P EST MOD 30 MIN: CPT

## 2022-07-14 RX ORDER — DESVENLAFAXINE 100 MG/1
100 TABLET, EXTENDED RELEASE ORAL EVERY MORNING
Qty: 30 TABLET | Refills: 2 | Status: SHIPPED | OUTPATIENT
Start: 2022-07-14 | End: 2022-10-20 | Stop reason: SDUPTHER

## 2022-09-19 ENCOUNTER — OFFICE VISIT (OUTPATIENT)
Dept: BARIATRICS/WEIGHT MGMT | Facility: CLINIC | Age: 32
End: 2022-09-19

## 2022-09-19 VITALS
HEIGHT: 67 IN | HEART RATE: 83 BPM | DIASTOLIC BLOOD PRESSURE: 71 MMHG | BODY MASS INDEX: 38.17 KG/M2 | OXYGEN SATURATION: 95 % | SYSTOLIC BLOOD PRESSURE: 112 MMHG | WEIGHT: 243.2 LBS | TEMPERATURE: 98.2 F

## 2022-09-19 DIAGNOSIS — E66.9 OBESITY, CLASS II, BMI 35-39.9: Primary | ICD-10-CM

## 2022-09-19 PROCEDURE — 99212 OFFICE O/P EST SF 10 MIN: CPT | Performed by: SURGERY

## 2022-09-19 NOTE — PROGRESS NOTES
MGK BAR SURG [facility] MEDICAL GROUP BARIATRIC SURGERY  [address] [place] [address]  [address] [place] [address]  Dept: [phone]  9/19/2022      Gwen Barnes.  [id number]  [id number]  1990  female    Date of last surgery: 3/22/2022Revision Gastric Bypass Laparoscopic With Davinci Robot      Chief Complaint: BH Post-Op Bariatric Surgery:   Gwen Barnes is status post procedure listed above  HPI: She was 320 after sleeve, then developed GERD and had bypass in march 2022. She was 198 after the sleeve.     Wt Readings from Last 10 Encounters:   09/19/22 110 kg (243 lb 3.2 oz)   07/14/22 111 kg (244 lb 12.8 oz)   04/29/22 115 kg (252 lb 12.8 oz)   04/06/22 117 kg (257 lb 15 oz)   03/28/22 122 kg (268 lb 3.2 oz)   03/26/22 124 kg (272 lb 14.9 oz)   03/22/22 125 kg (276 lb 3.2 oz)   02/21/22 127 kg (279 lb 3.2 oz)   01/17/22 124 kg (274 lb 3.2 oz)   11/04/21 119 kg (262 lb 5.6 oz)        Today's weight is 110 kg (243 lb 3.2 oz) pounds,@,@ has a  loss of 1 pounds since the last visit and@ weight loss since surgery is 25 pounds. The patient reports a decreased portion size and loss of appetite.      Gwen Barnes denies reflux/heartburn     Diet and Exercise: Diet history reviewed and discussed with the patient. Weight loss/gains to date discussed with the patient.     Breakfast granola bar- kind, or makenna bars   Lunch crackers peanut butter, or sandwich  Snack - popcorn or veg stick chips  Dinner -  Chicken, pork, corn, green beans,  Some potatoes  Snack - leftovers,     The patient states they are eating 30 grams of protein per day.     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    HENT: Negative.    Eyes: Negative.    Respiratory: Negative.    Cardiovascular: Negative.    Gastrointestinal: Negative.    Endocrine: Negative.    Genitourinary:  Negative.    Musculoskeletal: Negative.    Skin: Negative.    Allergic/Immunologic: Negative.    Neurological: Negative.    Hematological: Negative.    Psychiatric/Behavioral: Negative.    Patient Active Problem List   Diagnosis   • Obesity, Class III, BMI 40-49.9 (morbid obesity) ([facility])   • Body mass index 45.0-49.9, adult ([facility])   • Encounter for other preprocedural examination   • Heartburn   • Malaise and fatigue   • Polycystic ovaries   • Morbid (severe) obesity due to excess calories ([facility])   • Gastroesophageal reflux disease   • Recurrent major depressive disorder, in partial remission ([facility])   • Generalized anxiety disorder   • Morbid obesity ([facility])   • Obesity, Class II, BMI 35-39.9       Past Medical History:   Diagnosis Date   • Anxiety    • Depression    • Fatigue    • GERD (gastroesophageal reflux disease)    • Heartburn    • Morbid obesity ([facility])    • PCOS (polycystic ovarian syndrome)      Past Surgical History:   Procedure Laterality Date   • TONSILLECTOMY AND ADENOIDECTOMY  2002   • GASTRIC SLEEVE LAPAROSCOPIC N/A 8/6/2019    Procedure: laparoscopic sleeve gastrectomy;  Surgeon: Luna Keyes MD;  Location: [facility] MAIN OR;  Service: Bariatric   • CHOLECYSTECTOMY N/A 11/5/2019    Procedure: CHOLECYSTECTOMY LAPAROSCOPIC;  Surgeon: Luna Keyes MD;  Location: [facility] MAIN OR;  Service: General   • ENDOSCOPY N/A 11/4/2021    Procedure: ESOPHAGOGASTRODUODENOSCOPY with biopsy x 2 areas;  Surgeon: Luna Keyes MD;  Location: [facility] ENDOSCOPY;  Service: General;  Laterality: N/A;  post op: esophagitis   • GASTRIC BYPASS N/A 3/22/2022    Procedure: REVISION GASTRIC BYPASS LAPAROSCOPIC WITH DAVINCI ROBOT;  Surgeon: Luna Keyes MD;  Location: [facility] MAIN OR;  Service: Robotics - DaVinci;  Laterality: N/A;   • DILATATION AND CURETTAGE  2014 & 2019   • ENDOSCOPY        The following portions of the patient's history were reviewed and updated as appropriate: allergies, current medications, past family history, past  medical history, past social history, past surgical history and problem list.    Vitals:    09/19/22 1217   BP: 112/71   Pulse: 83   Temp: 98.2 °F (36.8 °C)   SpO2: 95%       Physical Exam  Awake and alert  Normal mental status  Normal pulmonary effort  Abdomen appropriate tenderness  Incisions no erythema  Extremities no tenderness or swelling      Assessment: morbid obesity bmi 38  Post-op, the patient is at a plateau.     Plan:     Encouraged patient to be sure to get plenty of lean protein per day through small frequent meals all with a protein source.   Activity restrictions: none.   Recommended patient be sure to get at least 70 grams of protein per day by eating small, frequent meals all with high lean protein choices. Be sure to limit/cut back on daily carbohydrate intake. Discussed with the patient the recommended amount of water per day to intake- half of body weight in ounces. Reviewed vitamin requirements. Be sure to do routine exercise, 150 minutes per week minimum, including both cardio and strength training.     Instructions / Recommendations: dietary counseling recommended, recommended a daily protein intake of  grams, vitamin supplement(s) recommended, recommended exercising at least 150 minutes per week, behavior modifications recommended and instructed to call the office for concerns, questions, or problems.     The patient was instructed to follow up in 1 months.     The patient was counseled regarding. Total time spent face to face was 15 minutes and 15 minutes was spent counseling.

## 2022-09-21 ENCOUNTER — TELEPHONE (OUTPATIENT)
Dept: BARIATRICS/WEIGHT MGMT | Facility: CLINIC | Age: 32
End: 2022-09-21

## 2022-09-21 NOTE — TELEPHONE ENCOUNTER
Spoke w/ pt. Advised by Dr. Keyes to schedule appt after starting meds. Pt to call back to schedule 6 mo follow up.

## 2022-09-22 ENCOUNTER — TELEPHONE (OUTPATIENT)
Dept: ENDOCRINOLOGY | Facility: CLINIC | Age: 32
End: 2022-09-22

## 2022-10-07 ENCOUNTER — TELEPHONE (OUTPATIENT)
Dept: BARIATRICS/WEIGHT MGMT | Facility: CLINIC | Age: 32
End: 2022-10-07

## 2022-10-18 DIAGNOSIS — E66.01 OBESITY, CLASS III, BMI 40-49.9 (MORBID OBESITY): Primary | ICD-10-CM

## 2022-10-18 RX ORDER — PHENTERMINE HYDROCHLORIDE 30 MG/1
30 CAPSULE ORAL EVERY MORNING
Qty: 30 CAPSULE | Refills: 0 | Status: SHIPPED | OUTPATIENT
Start: 2022-10-18

## 2022-10-19 NOTE — TELEPHONE ENCOUNTER
Attempted call to patient, no answer no VM.  Phentermine sent to pharmacy by Dr. Keyes yesterday.  Will call back for 1 mo f/u

## 2022-10-20 DIAGNOSIS — F41.1 GENERALIZED ANXIETY DISORDER: Chronic | ICD-10-CM

## 2022-10-20 DIAGNOSIS — F33.41 RECURRENT MAJOR DEPRESSIVE DISORDER, IN PARTIAL REMISSION: Chronic | ICD-10-CM

## 2022-10-21 RX ORDER — BUSPIRONE HYDROCHLORIDE 10 MG/1
10 TABLET ORAL 2 TIMES DAILY
Qty: 180 TABLET | Refills: 1 | Status: SHIPPED | OUTPATIENT
Start: 2022-10-21

## 2022-10-21 RX ORDER — DESVENLAFAXINE 100 MG/1
100 TABLET, EXTENDED RELEASE ORAL EVERY MORNING
Qty: 30 TABLET | Refills: 2 | Status: SHIPPED | OUTPATIENT
Start: 2022-10-21

## 2022-11-14 ENCOUNTER — TELEPHONE (OUTPATIENT)
Dept: BARIATRICS/WEIGHT MGMT | Facility: CLINIC | Age: 32
End: 2022-11-14

## 2022-11-17 ENCOUNTER — LAB (OUTPATIENT)
Dept: LAB | Facility: HOSPITAL | Age: 32
End: 2022-11-17

## 2022-11-17 DIAGNOSIS — E66.01 OBESITY, CLASS III, BMI 40-49.9 (MORBID OBESITY): ICD-10-CM

## 2022-11-17 LAB
ALBUMIN SERPL-MCNC: 3.6 G/DL (ref 3.5–5.2)
ALBUMIN/GLOB SERPL: 1.3 G/DL
ALP SERPL-CCNC: 96 U/L (ref 39–117)
ALT SERPL W P-5'-P-CCNC: <5 U/L (ref 1–33)
ANION GAP SERPL CALCULATED.3IONS-SCNC: 10.9 MMOL/L (ref 5–15)
AST SERPL-CCNC: 13 U/L (ref 1–32)
BASOPHILS # BLD AUTO: 0.02 10*3/MM3 (ref 0–0.2)
BASOPHILS NFR BLD AUTO: 0.3 % (ref 0–1.5)
BILIRUB SERPL-MCNC: <0.2 MG/DL (ref 0–1.2)
BUN SERPL-MCNC: 8 MG/DL (ref 6–20)
BUN/CREAT SERPL: 9.5 (ref 7–25)
CALCIUM SPEC-SCNC: 8.5 MG/DL (ref 8.6–10.5)
CHLORIDE SERPL-SCNC: 103 MMOL/L (ref 98–107)
CO2 SERPL-SCNC: 23.1 MMOL/L (ref 22–29)
CREAT SERPL-MCNC: 0.84 MG/DL (ref 0.57–1)
DEPRECATED RDW RBC AUTO: 41.4 FL (ref 37–54)
EGFRCR SERPLBLD CKD-EPI 2021: 96 ML/MIN/1.73
EOSINOPHIL # BLD AUTO: 0.02 10*3/MM3 (ref 0–0.4)
EOSINOPHIL NFR BLD AUTO: 0.3 % (ref 0.3–6.2)
ERYTHROCYTE [DISTWIDTH] IN BLOOD BY AUTOMATED COUNT: 12.9 % (ref 12.3–15.4)
GLOBULIN UR ELPH-MCNC: 2.7 GM/DL
GLUCOSE SERPL-MCNC: 71 MG/DL (ref 65–99)
HCT VFR BLD AUTO: 29.9 % (ref 34–46.6)
HGB BLD-MCNC: 9.8 G/DL (ref 12–15.9)
IMM GRANULOCYTES # BLD AUTO: 0.03 10*3/MM3 (ref 0–0.05)
IMM GRANULOCYTES NFR BLD AUTO: 0.5 % (ref 0–0.5)
IRON 24H UR-MRATE: 39 MCG/DL (ref 37–145)
LYMPHOCYTES # BLD AUTO: 0.74 10*3/MM3 (ref 0.7–3.1)
LYMPHOCYTES NFR BLD AUTO: 12 % (ref 19.6–45.3)
MCH RBC QN AUTO: 29 PG (ref 26.6–33)
MCHC RBC AUTO-ENTMCNC: 32.8 G/DL (ref 31.5–35.7)
MCV RBC AUTO: 88.5 FL (ref 79–97)
MONOCYTES # BLD AUTO: 0.43 10*3/MM3 (ref 0.1–0.9)
MONOCYTES NFR BLD AUTO: 7 % (ref 5–12)
NEUTROPHILS NFR BLD AUTO: 4.94 10*3/MM3 (ref 1.7–7)
NEUTROPHILS NFR BLD AUTO: 79.9 % (ref 42.7–76)
NRBC BLD AUTO-RTO: 0 /100 WBC (ref 0–0.2)
PLATELET # BLD AUTO: 258 10*3/MM3 (ref 140–450)
PMV BLD AUTO: 9.9 FL (ref 6–12)
POTASSIUM SERPL-SCNC: 3.6 MMOL/L (ref 3.5–5.2)
PREALB SERPL-MCNC: 16.7 MG/DL (ref 20–40)
PROT SERPL-MCNC: 6.3 G/DL (ref 6–8.5)
RBC # BLD AUTO: 3.38 10*6/MM3 (ref 3.77–5.28)
SODIUM SERPL-SCNC: 137 MMOL/L (ref 136–145)
WBC NRBC COR # BLD: 6.18 10*3/MM3 (ref 3.4–10.8)

## 2022-11-17 PROCEDURE — 36415 COLL VENOUS BLD VENIPUNCTURE: CPT

## 2022-11-17 PROCEDURE — 83921 ORGANIC ACID SINGLE QUANT: CPT

## 2022-11-17 PROCEDURE — 85025 COMPLETE CBC W/AUTO DIFF WBC: CPT

## 2022-11-17 PROCEDURE — 84134 ASSAY OF PREALBUMIN: CPT

## 2022-11-17 PROCEDURE — 84425 ASSAY OF VITAMIN B-1: CPT

## 2022-11-17 PROCEDURE — 80053 COMPREHEN METABOLIC PANEL: CPT

## 2022-11-17 PROCEDURE — 83540 ASSAY OF IRON: CPT

## 2022-11-18 ENCOUNTER — OFFICE VISIT (OUTPATIENT)
Dept: BARIATRICS/WEIGHT MGMT | Facility: CLINIC | Age: 32
End: 2022-11-18

## 2022-11-18 VITALS
HEART RATE: 89 BPM | OXYGEN SATURATION: 99 % | SYSTOLIC BLOOD PRESSURE: 134 MMHG | HEIGHT: 67 IN | DIASTOLIC BLOOD PRESSURE: 82 MMHG | WEIGHT: 250.6 LBS | BODY MASS INDEX: 39.33 KG/M2

## 2022-11-18 DIAGNOSIS — E66.01 OBESITY, CLASS III, BMI 40-49.9 (MORBID OBESITY): Primary | ICD-10-CM

## 2022-11-18 PROCEDURE — 99213 OFFICE O/P EST LOW 20 MIN: CPT | Performed by: SURGERY

## 2022-11-18 RX ORDER — PHENTERMINE HYDROCHLORIDE 37.5 MG/1
37.5 CAPSULE ORAL EVERY MORNING
Qty: 90 CAPSULE | Refills: 0 | Status: SHIPPED | OUTPATIENT
Start: 2022-11-18

## 2022-11-18 RX ORDER — CHLORHEXIDINE GLUCONATE 0.12 MG/ML
RINSE ORAL
COMMUNITY
Start: 2022-10-24

## 2022-11-18 RX ORDER — DROSPIRENONE AND ETHINYL ESTRADIOL 0.03MG-3MG
KIT ORAL
COMMUNITY
Start: 2022-10-11

## 2022-11-18 NOTE — PROGRESS NOTES
[facility] BAR SURG [facility] BARIATRIC SURGERY  [address] [place] [address]  [address] [place] [address]  Dept: [phone]  11/18/2022      Gwen Barnes.  [id number]  [id number]  1990  female    Date of last surgery: 3/22/2022Revision Gastric Bypass Laparoscopic With Davinci Robot      Chief Complaint: BH Post-Op Bariatric Surgery:   Gwen Barnes is status post procedure listed above  HPI:     Wt Readings from Last 10 Encounters:   11/18/22 114 kg (250 lb 9.6 oz)   09/19/22 110 kg (243 lb 3.2 oz)   07/14/22 111 kg (244 lb 12.8 oz)   04/29/22 115 kg (252 lb 12.8 oz)   04/06/22 117 kg (257 lb 15 oz)   03/28/22 122 kg (268 lb 3.2 oz)   03/26/22 124 kg (272 lb 14.9 oz)   03/22/22 125 kg (276 lb 3.2 oz)   02/21/22 127 kg (279 lb 3.2 oz)   01/17/22 124 kg (274 lb 3.2 oz)        Today's weight is 114 kg (250 lb 9.6 oz) pounds,@,@ has a  gain of 7 pounds since the last visit and@ weight loss since surgery is 24 pounds. The patient reports a decreased portion size and loss of appetite.      Gwen Barnes denies reflux/heartburn     Diet and Exercise: Diet history reviewed and discussed with the patient. Weight loss/gains to date discussed with the patient.     She reports eating 3 meals per day, a typical portion size of 1 cup, eating 1 snacks per day, drinking 8 or more 8-oz. glasses of water per day, no carbonated beverage consumption and exercising regularly.     Chicken, premier protein,     The patient states they are eating 60 grams of protein per day.     Some walking at work, walks dogs, has 2 small children          Review of Systems    Review of Systems   Constitutional: Negative.    HENT: Negative.    Eyes: Negative.    Respiratory: Negative.    Cardiovascular: Negative.    Gastrointestinal: Negative.    Endocrine: Negative.    Genitourinary: Negative.    Musculoskeletal: Negative.    Skin: Negative.    Allergic/Immunologic: Negative.     Neurological: Negative.    Hematological: Negative.    Psychiatric/Behavioral: Negative.    Patient Active Problem List   Diagnosis   • Obesity, Class III, BMI 40-49.9 (morbid obesity) ([facility])   • Body mass index 45.0-49.9, adult ([facility])   • Encounter for other preprocedural examination   • Heartburn   • Malaise and fatigue   • Polycystic ovaries   • Morbid (severe) obesity due to excess calories ([facility])   • Gastroesophageal reflux disease   • Recurrent major depressive disorder, in partial remission ([facility])   • Generalized anxiety disorder   • Morbid obesity ([facility])   • Obesity, Class II, BMI 35-39.9       Past Medical History:   Diagnosis Date   • Anxiety    • Depression    • Fatigue    • GERD (gastroesophageal reflux disease)    • Heartburn    • Morbid obesity ([facility])    • PCOS (polycystic ovarian syndrome)      Past Surgical History:   Procedure Laterality Date   • TONSILLECTOMY AND ADENOIDECTOMY  2002   • GASTRIC SLEEVE LAPAROSCOPIC N/A 8/6/2019    Procedure: laparoscopic sleeve gastrectomy;  Surgeon: Luna Keyes MD;  Location: [facility] MAIN OR;  Service: Bariatric   • CHOLECYSTECTOMY N/A 11/5/2019    Procedure: CHOLECYSTECTOMY LAPAROSCOPIC;  Surgeon: Luna Keyes MD;  Location: [facility] MAIN OR;  Service: General   • ENDOSCOPY N/A 11/4/2021    Procedure: ESOPHAGOGASTRODUODENOSCOPY with biopsy x 2 areas;  Surgeon: uLna Keyes MD;  Location: [facility] ENDOSCOPY;  Service: General;  Laterality: N/A;  post op: esophagitis   • GASTRIC BYPASS N/A 3/22/2022    Procedure: REVISION GASTRIC BYPASS LAPAROSCOPIC WITH DAVINCI ROBOT;  Surgeon: Luna Keyes MD;  Location: [facility] MAIN OR;  Service: Robotics - DaVinci;  Laterality: N/A;   • DILATATION AND CURETTAGE  2014 & 2019   • ENDOSCOPY        The following portions of the patient's history were reviewed and updated as appropriate: allergies, current medications, past family history, past medical history, past social history, past surgical history and problem list.    Vitals:     11/18/22 1250   BP: 134/82   Pulse: 89   SpO2: 99%       Physical Exam  Awake and alert  Normal mental status  Normal pulmonary effort  Abdomen appropriate tenderness  Incisions no erythema  Extremities no tenderness or swelling      Assessment:   Patient Active Problem List   Diagnosis   • Obesity, Class III, BMI 40-49.9 (morbid obesity) ([facility])   • Body mass index 45.0-49.9, adult ([facility])   • Encounter for other preprocedural examination   • Heartburn   • Malaise and fatigue   • Polycystic ovaries   • Morbid (severe) obesity due to excess calories ([facility])   • Gastroesophageal reflux disease   • Recurrent major depressive disorder, in partial remission ([facility])   • Generalized anxiety disorder   • Morbid obesity ([facility])   • Obesity, Class II, BMI 35-39.9       Post-op, the patient is gaining weight. Will refill Phentermine and also try metformin. I recommended getting back into the gym 3 days a week.     Plan:     Encouraged patient to be sure to get plenty of lean protein per day through small frequent meals all with a protein source.   Activity restrictions: none.   Recommended patient be sure to get at least 70 grams of protein per day by eating small, frequent meals all with high lean protein choices. Be sure to limit/cut back on daily carbohydrate intake. Discussed with the patient the recommended amount of water per day to intake- half of body weight in ounces. Reviewed vitamin requirements. Be sure to do routine exercise, 150 minutes per week minimum, including both cardio and strength training.     Instructions / Recommendations: dietary counseling recommended, recommended a daily protein intake of  grams, vitamin supplement(s) recommended, recommended exercising at least 150 minutes per week, behavior modifications recommended and instructed to call the office for concerns, questions, or problems.     The patient was instructed to follow up in 3 months.     The patient was counseled regarding. Total time spent  face to face was 15 minutes and 15 minutes was spent counseling.

## 2022-11-20 LAB — METHYLMALONATE SERPL-SCNC: 154 NMOL/L (ref 0–378)

## 2022-11-22 DIAGNOSIS — Z98.84 H/O GASTRIC BYPASS: Primary | ICD-10-CM

## 2022-11-22 LAB — VIT B1 BLD-SCNC: 92 NMOL/L (ref 66.5–200)

## 2022-11-23 ENCOUNTER — TELEPHONE (OUTPATIENT)
Dept: BARIATRICS/WEIGHT MGMT | Facility: CLINIC | Age: 32
End: 2022-11-23

## 2022-11-23 NOTE — TELEPHONE ENCOUNTER
Called Gwen to advise of lab results. She is pregnant. Will contact us with details when she has confirmed everything with her OB/GYN.

## 2022-11-23 NOTE — TELEPHONE ENCOUNTER
----- Message from LA Weir sent at 11/22/2022  2:09 PM EST -----  Her calcium and also prealbumin are slightly low. Please encourage her to get 60+ grams of protein in her diet a day and take her calcium citrate. Also her hgb was significantly lower than last check several months ago. Please verify she is not having any active vomiting blood or blood in stool. See if she is having excessively heavy menstrual cycle also. I am going to see if I can add an iron panel to this bodywork also. She needs to take her bariatric multi with iron.    No

## 2023-02-16 ENCOUNTER — TELEPHONE (OUTPATIENT)
Dept: BARIATRICS/WEIGHT MGMT | Facility: CLINIC | Age: 33
End: 2023-02-16
Payer: MEDICAID

## 2023-02-16 NOTE — TELEPHONE ENCOUNTER
Called patient and she informed us that she is pregnant and the baby is due next week.  She will phone back when she has the baby.

## 2023-04-13 NOTE — PROGRESS NOTES
Left a message on machine informing patient of refill and that he is overdue to a follow up for this medication and will need to follow up for future refills.       Last office visit: for acne 06/01/22        for hyperhydrosis 01/17/22    Next office visit: none    Office note from 01/17/22 states to return yearly.           She had recent lap gastric sleeve and continues to have pain in her incision just above the umbilicus.    Vitals are stable      There is tenderness right at a small area just superior to the incision just above the umbilicus which is the largest incision in the lumen with a fascial stitch.  She has no other tenderness elsewhere in the abdomen      A/p     I reassured her that this is just incisional pain and as long as she does not develop any further symptoms she should continue full liquid diet and to call as needed

## 2023-04-27 ENCOUNTER — TELEPHONE (OUTPATIENT)
Dept: BARIATRICS/WEIGHT MGMT | Facility: CLINIC | Age: 33
End: 2023-04-27
Payer: MEDICAID

## 2023-04-27 NOTE — TELEPHONE ENCOUNTER
Jairon Llanos verify breast feeding. Patient is breast feeding.  Patient's OB recommended seeing us due to pain in upper abdomen with eating. MN

## 2023-04-28 ENCOUNTER — OFFICE VISIT (OUTPATIENT)
Dept: BARIATRICS/WEIGHT MGMT | Facility: CLINIC | Age: 33
End: 2023-04-28
Payer: MEDICAID

## 2023-04-28 VITALS
BODY MASS INDEX: 34.65 KG/M2 | WEIGHT: 220.8 LBS | DIASTOLIC BLOOD PRESSURE: 59 MMHG | HEIGHT: 67 IN | SYSTOLIC BLOOD PRESSURE: 129 MMHG | OXYGEN SATURATION: 96 % | RESPIRATION RATE: 10 BRPM

## 2023-04-28 DIAGNOSIS — E66.9 OBESITY, CLASS I, BMI 30-34.9: Primary | ICD-10-CM

## 2023-04-28 DIAGNOSIS — Z98.84 H/O GASTRIC BYPASS: ICD-10-CM

## 2023-04-28 DIAGNOSIS — R10.10 PAIN OF UPPER ABDOMEN: ICD-10-CM

## 2023-04-28 NOTE — PROGRESS NOTES
[facility]K BAR SURG [facility] GROUP BARIATRIC SURGERY  [address] [place] [address]  [address] [place] [address]  Dept: [phone]  4/28/2023      Gwen Barnes.  [id number]  [id number]  1990  female    Date of last surgery: 3/22/2022Revision Gastric Bypass Laparoscopic With Davinci Robot      Chief Complaint:  Post-Op Bariatric Surgery:   Gwen Barnes is status post procedure listed above  HPI:     Wt Readings from Last 10 Encounters:   04/28/23 100 kg (220 lb 12.8 oz)   11/18/22 114 kg (250 lb 9.6 oz)   09/19/22 110 kg (243 lb 3.2 oz)   07/14/22 111 kg (244 lb 12.8 oz)   04/29/22 115 kg (252 lb 12.8 oz)   04/06/22 117 kg (257 lb 15 oz)   03/28/22 122 kg (268 lb 3.2 oz)   03/26/22 124 kg (272 lb 14.9 oz)   03/22/22 125 kg (276 lb 3.2 oz)   02/21/22 127 kg (279 lb 3.2 oz)        Today's weight is 100 kg (220 lb 12.8 oz) pounds,@ has a  loss of 30 pounds since the last visit and@ weight loss since surgery is 56.3 pounds. The patient reports a decreased portion size and loss of appetite.      Gwen Barnes denies reflux/heartburn, nausea and vomiting     Diet and Exercise: Diet history reviewed and discussed with the patient. Weight loss/gains to date discussed with the patient.     She reports eating 2 meals per day, a typical portion size of 1/2 cup, eating 1 snacks per day, drinking 8 or more 8-oz. glasses of water per day, no carbonated beverage consumption and exercising regularly.       The patient states they are eating 50 grams of protein per day.     Abdominal pain, pressure , no GERD , no nausea or vomiting     Abdominal pain after eating or drinking , symptoms 4-5 times , lasts few minutes and then goes away   No bowel movement changes  Status post cholecystectomy     Eggs or oatmeal , lactation cookies and other lactation cookies   No lunch  Dinner: chicken and rice, pulled pork minus bread, chicken salad,   Drinks: water, sweet tea prn , 4 times a  week   Snacks: chips and salsa (craving when pregnant) , blue chips,   Walking 2 miles yesterday , off all restrictions       Muscle milk 40 gram protein shake every couple days       Review of Systems   Constitutional: Positive for activity change and appetite change.   Respiratory: Negative.    Cardiovascular: Negative.    Gastrointestinal: Positive for abdominal pain.   Musculoskeletal: Negative.          Patient Active Problem List   Diagnosis   • Obesity, Class III, BMI 40-49.9 (morbid obesity)   • Body mass index 45.0-49.9, adult   • Encounter for other preprocedural examination   • Heartburn   • Malaise and fatigue   • Polycystic ovaries   • Morbid (severe) obesity due to excess calories   • Gastroesophageal reflux disease   • Recurrent major depressive disorder, in partial remission   • Generalized anxiety disorder   • Morbid obesity   • Obesity, Class II, BMI 35-39.9       Past Medical History:   Diagnosis Date   • Anxiety    • Depression    • Fatigue    • GERD (gastroesophageal reflux disease)    • Heartburn    • Morbid obesity    • PCOS (polycystic ovarian syndrome)      Past Surgical History:   Procedure Laterality Date   • TONSILLECTOMY AND ADENOIDECTOMY  2002   • GASTRIC SLEEVE LAPAROSCOPIC N/A 08/06/2019    Procedure: laparoscopic sleeve gastrectomy;  Surgeon: Luna Keyes MD;  Location: [facility] MAIN OR;  Service: Bariatric   • CHOLECYSTECTOMY N/A 11/05/2019    Procedure: CHOLECYSTECTOMY LAPAROSCOPIC;  Surgeon: Luna Keyes MD;  Location: [facility] MAIN OR;  Service: General   • ENDOSCOPY N/A 11/04/2021    Procedure: ESOPHAGOGASTRODUODENOSCOPY with biopsy x 2 areas;  Surgeon: Luna Keyes MD;  Location: [facility] ENDOSCOPY;  Service: General;  Laterality: N/A;  post op: esophagitis   • GASTRIC BYPASS N/A 03/22/2022    Procedure: REVISION GASTRIC BYPASS LAPAROSCOPIC WITH DAVINCI ROBOT;  Surgeon: Luna Keyse MD;  Location: [facility] MAIN OR;  Service: Robotics - DaVinci;  Laterality: N/A;   • DILATATION AND  CURETTAGE  2014 & 2019   • ENDOSCOPY        The following portions of the patient's history were reviewed and updated as appropriate: allergies, current medications, past medical history, past social history, past surgical history and problem list.    Vitals:    04/28/23 1353   BP: 129/59   Resp: 10   SpO2: 96%       Physical Exam  Constitutional:       Appearance: Normal appearance. She is obese.   Pulmonary:      Effort: Pulmonary effort is normal.   Abdominal:      General: Abdomen is flat.      Palpations: Abdomen is soft.      Comments: Slightly tender in center of upper abdomen with palpation    Skin:     General: Skin is warm.   Neurological:      General: No focal deficit present.      Mental Status: She is alert and oriented to person, place, and time.   Psychiatric:         Mood and Affect: Mood normal.         Behavior: Behavior normal.         Thought Content: Thought content normal.         Judgment: Judgment normal.             Assessment:   BMI 34.58, class 1 obesity, 7 weeks post delivery of baby, 1 yr gastric bypass, abdominal pain    Post-op, the patient is doing well with weight loss. She is actually 7 weeks post delivery of a baby ( pt got pregnant a few months after gastric bypass surgery). Pt is struggling with protein intake and would benefit from dietary education from dietician. Pt is trying to breast feed her child also. Encourage 60+ grams of protein a day. Pt did get cleared last week by her OBGYN to start exercising. Encourage 20-30 minutes of exercise 2-3 days a week.     Pt is having some upper middle abdominal pain. Pt denies any nausea/ vomiting or GERD. States the pain has gotten worse after pregnancy. Pt describes pain as pressure in center of chest when eating or drinking. I did look and pt did not have a hiatal hernia with either gastric sleeve or gastric bypass. Symptoms are possibly related to marginal ulcer. Plan to order EGD to further assess pain. Will also check labs. Plan  to follow up for EGD.     Plan:     Encouraged patient to be sure to get plenty of lean protein per day through small frequent meals all with a protein source.   Activity restrictions: none.   Recommended patient be sure to get at least 70 grams of protein per day by eating small, frequent meals all with high lean protein choices. Be sure to limit/cut back on daily carbohydrate intake. Discussed with the patient the recommended amount of water per day to intake- half of body weight in ounces. Reviewed vitamin requirements. Be sure to do routine exercise, 150 minutes per week minimum, including both cardio and strength training.     Instructions / Recommendations: dietary counseling recommended, recommended a daily protein intake of  grams, vitamin supplement(s) recommended, recommended exercising at least 150 minutes per week, behavior modifications recommended and instructed to call the office for concerns, questions, or problems.     The patient was instructed to follow up after EGD.      The patient was counseled regarding abdominal pain, diet, exercise. Total time spent face to face was 30 minutes and 15 minutes was spent counseling.     LA Epps  [facility] Bariatrics

## 2023-04-28 NOTE — H&P (VIEW-ONLY)
[facility] BAR SUR[facility] GROUP BARIATRIC SURGERY  [address] [place] [address]  [address] [place] [address]  Dept: [phone]  4/28/2023      Gwen Barnes.  [id number]  [id number]  1990  female    Date of last surgery: 3/22/2022Revision Gastric Bypass Laparoscopic With Davinci Robot      Chief Complaint:  Post-Op Bariatric Surgery:   Gwen Barnes is status post procedure listed above  HPI:     Wt Readings from Last 10 Encounters:   04/28/23 100 kg (220 lb 12.8 oz)   11/18/22 114 kg (250 lb 9.6 oz)   09/19/22 110 kg (243 lb 3.2 oz)   07/14/22 111 kg (244 lb 12.8 oz)   04/29/22 115 kg (252 lb 12.8 oz)   04/06/22 117 kg (257 lb 15 oz)   03/28/22 122 kg (268 lb 3.2 oz)   03/26/22 124 kg (272 lb 14.9 oz)   03/22/22 125 kg (276 lb 3.2 oz)   02/21/22 127 kg (279 lb 3.2 oz)        Today's weight is 100 kg (220 lb 12.8 oz) pounds,@ has a  loss of 30 pounds since the last visit and@ weight loss since surgery is 56.3 pounds. The patient reports a decreased portion size and loss of appetite.      Gwen Barnes denies reflux/heartburn, nausea and vomiting     Diet and Exercise: Diet history reviewed and discussed with the patient. Weight loss/gains to date discussed with the patient.     She reports eating 2 meals per day, a typical portion size of 1/2 cup, eating 1 snacks per day, drinking 8 or more 8-oz. glasses of water per day, no carbonated beverage consumption and exercising regularly.       The patient states they are eating 50 grams of protein per day.     Abdominal pain, pressure , no GERD , no nausea or vomiting     Abdominal pain after eating or drinking , symptoms 4-5 times , lasts few minutes and then goes away   No bowel movement changes  Status post cholecystectomy     Eggs or oatmeal , lactation cookies and other lactation cookies   No lunch  Dinner: chicken and rice, pulled pork minus bread, chicken salad,   Drinks: water, sweet tea prn , 4 times a  week   Snacks: chips and salsa (craving when pregnant) , blue chips,   Walking 2 miles yesterday , off all restrictions       Muscle milk 40 gram protein shake every couple days       Review of Systems   Constitutional: Positive for activity change and appetite change.   Respiratory: Negative.    Cardiovascular: Negative.    Gastrointestinal: Positive for abdominal pain.   Musculoskeletal: Negative.          Patient Active Problem List   Diagnosis   • Obesity, Class III, BMI 40-49.9 (morbid obesity)   • Body mass index 45.0-49.9, adult   • Encounter for other preprocedural examination   • Heartburn   • Malaise and fatigue   • Polycystic ovaries   • Morbid (severe) obesity due to excess calories   • Gastroesophageal reflux disease   • Recurrent major depressive disorder, in partial remission   • Generalized anxiety disorder   • Morbid obesity   • Obesity, Class II, BMI 35-39.9       Past Medical History:   Diagnosis Date   • Anxiety    • Depression    • Fatigue    • GERD (gastroesophageal reflux disease)    • Heartburn    • Morbid obesity    • PCOS (polycystic ovarian syndrome)      Past Surgical History:   Procedure Laterality Date   • TONSILLECTOMY AND ADENOIDECTOMY  2002   • GASTRIC SLEEVE LAPAROSCOPIC N/A 08/06/2019    Procedure: laparoscopic sleeve gastrectomy;  Surgeon: Luna Keyes MD;  Location: [facility] MAIN OR;  Service: Bariatric   • CHOLECYSTECTOMY N/A 11/05/2019    Procedure: CHOLECYSTECTOMY LAPAROSCOPIC;  Surgeon: Luna Keyes MD;  Location: [facility] MAIN OR;  Service: General   • ENDOSCOPY N/A 11/04/2021    Procedure: ESOPHAGOGASTRODUODENOSCOPY with biopsy x 2 areas;  Surgeon: Luna Keyes MD;  Location: [facility] ENDOSCOPY;  Service: General;  Laterality: N/A;  post op: esophagitis   • GASTRIC BYPASS N/A 03/22/2022    Procedure: REVISION GASTRIC BYPASS LAPAROSCOPIC WITH DAVINCI ROBOT;  Surgeon: Luna Keyes MD;  Location: [facility] MAIN OR;  Service: Robotics - DaVinci;  Laterality: N/A;   • DILATATION AND  CURETTAGE  2014 & 2019   • ENDOSCOPY        The following portions of the patient's history were reviewed and updated as appropriate: allergies, current medications, past medical history, past social history, past surgical history and problem list.    Vitals:    04/28/23 1353   BP: 129/59   Resp: 10   SpO2: 96%       Physical Exam  Constitutional:       Appearance: Normal appearance. She is obese.   Pulmonary:      Effort: Pulmonary effort is normal.   Abdominal:      General: Abdomen is flat.      Palpations: Abdomen is soft.      Comments: Slightly tender in center of upper abdomen with palpation    Skin:     General: Skin is warm.   Neurological:      General: No focal deficit present.      Mental Status: She is alert and oriented to person, place, and time.   Psychiatric:         Mood and Affect: Mood normal.         Behavior: Behavior normal.         Thought Content: Thought content normal.         Judgment: Judgment normal.             Assessment:   BMI 34.58, class 1 obesity, 7 weeks post delivery of baby, 1 yr gastric bypass, abdominal pain    Post-op, the patient is doing well with weight loss. She is actually 7 weeks post delivery of a baby ( pt got pregnant a few months after gastric bypass surgery). Pt is struggling with protein intake and would benefit from dietary education from dietician. Pt is trying to breast feed her child also. Encourage 60+ grams of protein a day. Pt did get cleared last week by her OBGYN to start exercising. Encourage 20-30 minutes of exercise 2-3 days a week.     Pt is having some upper middle abdominal pain. Pt denies any nausea/ vomiting or GERD. States the pain has gotten worse after pregnancy. Pt describes pain as pressure in center of chest when eating or drinking. I did look and pt did not have a hiatal hernia with either gastric sleeve or gastric bypass. Symptoms are possibly related to marginal ulcer. Plan to order EGD to further assess pain. Will also check labs. Plan  to follow up for EGD.     Plan:     Encouraged patient to be sure to get plenty of lean protein per day through small frequent meals all with a protein source.   Activity restrictions: none.   Recommended patient be sure to get at least 70 grams of protein per day by eating small, frequent meals all with high lean protein choices. Be sure to limit/cut back on daily carbohydrate intake. Discussed with the patient the recommended amount of water per day to intake- half of body weight in ounces. Reviewed vitamin requirements. Be sure to do routine exercise, 150 minutes per week minimum, including both cardio and strength training.     Instructions / Recommendations: dietary counseling recommended, recommended a daily protein intake of  grams, vitamin supplement(s) recommended, recommended exercising at least 150 minutes per week, behavior modifications recommended and instructed to call the office for concerns, questions, or problems.     The patient was instructed to follow up after EGD.      The patient was counseled regarding abdominal pain, diet, exercise. Total time spent face to face was 30 minutes and 15 minutes was spent counseling.     LA Epps  [facility]ariatrics

## 2023-05-01 ENCOUNTER — PREP FOR SURGERY (OUTPATIENT)
Dept: OTHER | Facility: HOSPITAL | Age: 33
End: 2023-05-01
Payer: MEDICAID

## 2023-05-01 DIAGNOSIS — R10.10 PAIN OF UPPER ABDOMEN: ICD-10-CM

## 2023-05-01 DIAGNOSIS — Z98.84 H/O GASTRIC BYPASS: Primary | ICD-10-CM

## 2023-05-01 DIAGNOSIS — E66.9 OBESITY, CLASS I, BMI 30-34.9: ICD-10-CM

## 2023-05-01 RX ORDER — SODIUM CHLORIDE 0.9 % (FLUSH) 0.9 %
10 SYRINGE (ML) INJECTION AS NEEDED
OUTPATIENT
Start: 2023-05-01

## 2023-05-01 RX ORDER — SODIUM CHLORIDE 9 MG/ML
30 INJECTION, SOLUTION INTRAVENOUS CONTINUOUS PRN
OUTPATIENT
Start: 2023-05-01

## 2023-05-10 ENCOUNTER — ANESTHESIA EVENT (OUTPATIENT)
Dept: GASTROENTEROLOGY | Facility: HOSPITAL | Age: 33
End: 2023-05-10
Payer: MEDICAID

## 2023-05-11 ENCOUNTER — ANESTHESIA (OUTPATIENT)
Dept: GASTROENTEROLOGY | Facility: HOSPITAL | Age: 33
End: 2023-05-11
Payer: MEDICAID

## 2023-05-11 ENCOUNTER — HOSPITAL ENCOUNTER (OUTPATIENT)
Facility: HOSPITAL | Age: 33
Setting detail: HOSPITAL OUTPATIENT SURGERY
Discharge: HOME | End: 2023-05-11
Attending: SURGERY | Admitting: SURGERY
Payer: MEDICAID

## 2023-05-11 VITALS
HEIGHT: 68 IN | DIASTOLIC BLOOD PRESSURE: 63 MMHG | RESPIRATION RATE: 12 BRPM | OXYGEN SATURATION: 99 % | BODY MASS INDEX: 33.15 KG/M2 | SYSTOLIC BLOOD PRESSURE: 108 MMHG | WEIGHT: 218.7 LBS | TEMPERATURE: 98.1 F | HEART RATE: 68 BPM

## 2023-05-11 DIAGNOSIS — R10.10 PAIN OF UPPER ABDOMEN: ICD-10-CM

## 2023-05-11 DIAGNOSIS — E66.9 OBESITY, CLASS I, BMI 30-34.9: ICD-10-CM

## 2023-05-11 DIAGNOSIS — Z98.84 H/O GASTRIC BYPASS: ICD-10-CM

## 2023-05-11 LAB — B-HCG UR QL: NEGATIVE

## 2023-05-11 PROCEDURE — 81025 URINE PREGNANCY TEST: CPT | Performed by: SURGERY

## 2023-05-11 PROCEDURE — 88305 TISSUE EXAM BY PATHOLOGIST: CPT | Performed by: SURGERY

## 2023-05-11 PROCEDURE — 25010000002 PROPOFOL 1000 MG/100ML EMULSION: Performed by: NURSE ANESTHETIST, CERTIFIED REGISTERED

## 2023-05-11 PROCEDURE — 43239 EGD BIOPSY SINGLE/MULTIPLE: CPT | Performed by: SURGERY

## 2023-05-11 RX ORDER — SODIUM CHLORIDE 0.9 % (FLUSH) 0.9 %
10 SYRINGE (ML) INJECTION AS NEEDED
Status: DISCONTINUED | OUTPATIENT
Start: 2023-05-11 | End: 2023-05-11 | Stop reason: HOSPADM

## 2023-05-11 RX ORDER — SODIUM CHLORIDE 9 MG/ML
40 INJECTION, SOLUTION INTRAVENOUS AS NEEDED
Status: DISCONTINUED | OUTPATIENT
Start: 2023-05-11 | End: 2023-05-11 | Stop reason: HOSPADM

## 2023-05-11 RX ORDER — HYDRALAZINE HYDROCHLORIDE 20 MG/ML
5 INJECTION INTRAMUSCULAR; INTRAVENOUS
Status: DISCONTINUED | OUTPATIENT
Start: 2023-05-11 | End: 2023-05-11 | Stop reason: HOSPADM

## 2023-05-11 RX ORDER — IPRATROPIUM BROMIDE AND ALBUTEROL SULFATE 2.5; .5 MG/3ML; MG/3ML
3 SOLUTION RESPIRATORY (INHALATION) ONCE AS NEEDED
Status: DISCONTINUED | OUTPATIENT
Start: 2023-05-11 | End: 2023-05-11 | Stop reason: HOSPADM

## 2023-05-11 RX ORDER — LIDOCAINE HYDROCHLORIDE 20 MG/ML
INJECTION, SOLUTION INFILTRATION; PERINEURAL AS NEEDED
Status: DISCONTINUED | OUTPATIENT
Start: 2023-05-11 | End: 2023-05-11 | Stop reason: SURG

## 2023-05-11 RX ORDER — PROPOFOL 10 MG/ML
INJECTION, EMULSION INTRAVENOUS AS NEEDED
Status: DISCONTINUED | OUTPATIENT
Start: 2023-05-11 | End: 2023-05-11 | Stop reason: SURG

## 2023-05-11 RX ORDER — ONDANSETRON 2 MG/ML
4 INJECTION INTRAMUSCULAR; INTRAVENOUS ONCE AS NEEDED
Status: DISCONTINUED | OUTPATIENT
Start: 2023-05-11 | End: 2023-05-11 | Stop reason: HOSPADM

## 2023-05-11 RX ORDER — SODIUM CHLORIDE 9 MG/ML
30 INJECTION, SOLUTION INTRAVENOUS CONTINUOUS PRN
Status: DISCONTINUED | OUTPATIENT
Start: 2023-05-11 | End: 2023-05-11 | Stop reason: HOSPADM

## 2023-05-11 RX ORDER — EPHEDRINE SULFATE 5 MG/ML
5 INJECTION INTRAVENOUS ONCE AS NEEDED
Status: DISCONTINUED | OUTPATIENT
Start: 2023-05-11 | End: 2023-05-11 | Stop reason: HOSPADM

## 2023-05-11 RX ORDER — SODIUM CHLORIDE 9 MG/ML
INJECTION, SOLUTION INTRAVENOUS CONTINUOUS PRN
Status: DISCONTINUED | OUTPATIENT
Start: 2023-05-11 | End: 2023-05-11 | Stop reason: SURG

## 2023-05-11 RX ORDER — DIPHENHYDRAMINE HYDROCHLORIDE 50 MG/ML
12.5 INJECTION INTRAMUSCULAR; INTRAVENOUS
Status: DISCONTINUED | OUTPATIENT
Start: 2023-05-11 | End: 2023-05-11 | Stop reason: HOSPADM

## 2023-05-11 RX ORDER — MEPERIDINE HYDROCHLORIDE 25 MG/ML
12.5 INJECTION INTRAMUSCULAR; INTRAVENOUS; SUBCUTANEOUS
Status: DISCONTINUED | OUTPATIENT
Start: 2023-05-11 | End: 2023-05-11 | Stop reason: HOSPADM

## 2023-05-11 RX ORDER — LABETALOL HYDROCHLORIDE 5 MG/ML
5 INJECTION, SOLUTION INTRAVENOUS
Status: DISCONTINUED | OUTPATIENT
Start: 2023-05-11 | End: 2023-05-11 | Stop reason: HOSPADM

## 2023-05-11 RX ORDER — SODIUM CHLORIDE 9 MG/ML
9 INJECTION, SOLUTION INTRAVENOUS CONTINUOUS PRN
Status: DISCONTINUED | OUTPATIENT
Start: 2023-05-11 | End: 2023-05-11 | Stop reason: HOSPADM

## 2023-05-11 RX ORDER — MIDAZOLAM HYDROCHLORIDE 1 MG/ML
1 INJECTION INTRAMUSCULAR; INTRAVENOUS
Status: DISCONTINUED | OUTPATIENT
Start: 2023-05-11 | End: 2023-05-11 | Stop reason: HOSPADM

## 2023-05-11 RX ORDER — SUCRALFATE ORAL 1 G/10ML
1 SUSPENSION ORAL 4 TIMES DAILY
Qty: 414 ML | Refills: 5 | Status: SHIPPED | OUTPATIENT
Start: 2023-05-11 | End: 2023-05-21

## 2023-05-11 RX ORDER — SODIUM CHLORIDE 0.9 % (FLUSH) 0.9 %
10 SYRINGE (ML) INJECTION EVERY 12 HOURS SCHEDULED
Status: DISCONTINUED | OUTPATIENT
Start: 2023-05-11 | End: 2023-05-11 | Stop reason: HOSPADM

## 2023-05-11 RX ORDER — OMEPRAZOLE 40 MG/1
40 CAPSULE, DELAYED RELEASE ORAL DAILY
Qty: 30 CAPSULE | Refills: 6 | Status: SHIPPED | OUTPATIENT
Start: 2023-05-11

## 2023-05-11 RX ADMIN — LIDOCAINE HYDROCHLORIDE 60 MG: 20 INJECTION, SOLUTION INFILTRATION; PERINEURAL at 12:51

## 2023-05-11 RX ADMIN — SODIUM CHLORIDE: 0.9 INJECTION, SOLUTION INTRAVENOUS at 12:47

## 2023-05-11 RX ADMIN — PROPOFOL INJECTABLE EMULSION 200 MG: 10 INJECTION, EMULSION INTRAVENOUS at 12:51

## 2023-05-11 RX ADMIN — SODIUM CHLORIDE 9 ML/HR: 0.9 INJECTION, SOLUTION INTRAVENOUS at 12:48

## 2023-05-11 NOTE — DISCHARGE INSTRUCTIONS
You have a marginal ulcer which is a common complication of gastric bypass.  Please do not smoke or even be around others or smoking.  Do not take NSAIDs such as ibuprofen or Aleve.  I have prescribed omeprazole which she should take daily and also a liquid medicine called sucralfate that will help the ulcer heal.  Take both of these for at least 1 month.  After 1 month he can stop taking the sucralfate but continue to take the omeprazole indefinitely.        A responsible adult should stay with you and you should rest quietly for the rest of the day.    Do not drink alcohol, drive, operate any heavy machinery or power tools or make any legal/important decisions for the next 24 hours.     Progress your diet as tolerated.  If you begin to experience severe pain, increased shortness of breath, racing heartbeat or a fever above 101 F, seek immediate medical attention.     Follow up with MD as instructed. Call office for results in 3 to 5 days if needed. [phone]

## 2023-05-11 NOTE — OP NOTE
Surgeon:  Luna Keyes MD  Preoperative Diagnosis: Epigastric pain after gastric bypass    Postoperative Diagnosis: Marginal ulcer    Procedure Performed: Upper endoscopy with biopsy of the gastric pouch to check for H. pylori    Indications: The patient has history of gastric bypass and has been experiencing epigastric pain.      Specimen: biopsy of gastric antrum for H. Pylori    EBL: none    Procedure:     The procedure, indications, preparation and potential complications were explained to the patient, who indicated understanding and signed the corresponding consent forms.  The patient was identified, taken to the endoscopy suite, and placed on the left side down decubitus position.  The patient underwent a MAC anesthesia and was appropriately monitored through the case by the anesthesia personnel with continuous pulse oximetry, blood pressure, and cardiac monitoring.  A bite block was placed.  After adequate IV sedation and using a transoral technique a lubed flexible endoscope was placed in the hypopharynx and advanced to the gastric pouch.  Just past the gastrojejunostomy there was evidence of marginal ulceration.  I performed a biopsy of the gastric pouch to rule out H. pylori.  The scope was then removed.

## 2023-05-11 NOTE — ANESTHESIA PREPROCEDURE EVALUATION
Anesthesia Evaluation     Patient summary reviewed and Nursing notes reviewed   NPO Solid Status: > 8 hours  NPO Liquid Status: > 8 hours           Airway   Mallampati: I  TM distance: >3 FB  Neck ROM: full  No difficulty expected  Dental - normal exam     Pulmonary - negative pulmonary ROS and normal exam   Cardiovascular - negative cardio ROS and normal exam        Neuro/Psych- negative ROS  GI/Hepatic/Renal/Endo    (+) morbid obesity,      Musculoskeletal (-) negative ROS    Abdominal  - normal exam    Bowel sounds: normal.   Substance History - negative use     OB/GYN negative ob/gyn ROS         Other          Other Comment: Medical History  Current as of 05/10/23 2218  History Comments History Comments  Anxiety  Heartburn   Fatigue  PCOS (polycystic ovarian syndrome)   Morbid obesity  GERD (gastroesophageal reflux disease)   Depression       Surgical History  Current as of 05/10/23 2218  TONSILLECTOMY AND ADENOIDECTOMY DILATATION AND CURETTAGE  GASTRIC SLEEVE LAPAROSCOPIC CHOLECYSTECTOMY  ENDOSCOPY ENDOSCOPY  GASTRIC BYPASS       ROS/Med Hx Other: EGD 2021 550MG PROPOFOL                  Anesthesia Plan    ASA 3     general     intravenous induction     Anesthetic plan, risks, benefits, and alternatives have been provided, discussed and informed consent has been obtained with: patient.    Plan discussed with CRNA.        CODE STATUS:

## 2023-05-11 NOTE — ANESTHESIA POSTPROCEDURE EVALUATION
Patient: Gwen Barnes    Procedure Summary     Date: 05/11/23 Room / Location: [facility] ENDOSCOPY 4 / [facility] ENDOSCOPY    Anesthesia Start: 1247 Anesthesia Stop: 1301    Procedure: ESOPHAGOGASTRODUODENOSCOPY WITH BIOPSY X1 AREA Diagnosis:       H/O gastric bypass      Pain of upper abdomen      (H/O gastric bypass [Z98.84])      (Pain of upper abdomen [R10.10])    Surgeons: Luna Keyes MD Provider: Gideon Buitrago MD    Anesthesia Type: general ASA Status: 3          Anesthesia Type: general    Vitals  Vitals Value Taken Time   /63 05/11/23 1316   Temp     Pulse 60 05/11/23 1318   Resp 12 05/11/23 1316   SpO2 99 % 05/11/23 1318   Vitals shown include unvalidated device data.        Post Anesthesia Care and Evaluation    Patient location during evaluation: PACU  Patient participation: complete - patient participated  Level of consciousness: awake  Pain scale: See nurse's notes for pain score.  Pain management: adequate    Airway patency: patent  Anesthetic complications: No anesthetic complications  PONV Status: none  Cardiovascular status: acceptable  Respiratory status: acceptable and spontaneous ventilation  Hydration status: acceptable    Comments: Patient seen and examined postoperatively; vital signs stable; SpO2 greater than or equal to 90%; cardiopulmonary status stable; nausea/vomiting adequately controlled; pain adequately controlled; no apparent anesthesia complications; patient discharged from anesthesia care when discharge criteria were met

## 2023-05-12 LAB
LAB AP CASE REPORT: NORMAL
PATH REPORT.FINAL DX SPEC: NORMAL
PATH REPORT.GROSS SPEC: NORMAL

## 2023-06-05 ENCOUNTER — TELEPHONE (OUTPATIENT)
Dept: BARIATRICS/WEIGHT MGMT | Facility: CLINIC | Age: 33
End: 2023-06-05
Payer: MEDICAID

## 2023-06-05 NOTE — TELEPHONE ENCOUNTER
Called pt to remind of outstanding labs. Pt voiced understanding. Stated she will go asap to complete. Also, made appt with Wu ellington. ANNABELLA

## 2023-06-12 NOTE — PROGRESS NOTES
Nutrition Services    Patient Name: Gwen Barnes  YOB: 1990  MRN: [id number]  Date of Service: 06/15/23      ICD-10-CM ICD-9-CM   1. Obesity, Class I, BMI 30-34.9  E66.9 278.00          NUTRITION ASSESSMENT - BARIATRIC SURGERY      Reason for Visit Weight gain s/p bypass 03/22     H&P      Past Medical History:   Diagnosis Date    Anxiety     Depression     Fatigue     GERD (gastroesophageal reflux disease)     Heartburn     Morbid obesity     PCOS (polycystic ovarian syndrome)        Past Surgical History:   Procedure Laterality Date    CHOLECYSTECTOMY N/A 11/05/2019    Procedure: CHOLECYSTECTOMY LAPAROSCOPIC;  Surgeon: Luna Keyes MD;  Location: [facility] MAIN OR;  Service: General    DILATATION AND CURETTAGE  2014 & 2019    ENDOSCOPY      ENDOSCOPY N/A 11/04/2021    Procedure: ESOPHAGOGASTRODUODENOSCOPY with biopsy x 2 areas;  Surgeon: Luna Keyes MD;  Location: [facility] ENDOSCOPY;  Service: General;  Laterality: N/A;  post op: esophagitis    ENDOSCOPY N/A 5/11/2023    Procedure: ESOPHAGOGASTRODUODENOSCOPY WITH BIOPSY X1 AREA;  Surgeon: Luna Keyes MD;  Location: [facility] ENDOSCOPY;  Service: General;  Laterality: N/A;  POST: GASTRIC ULCER    GASTRIC BYPASS N/A 03/22/2022    Procedure: REVISION GASTRIC BYPASS LAPAROSCOPIC WITH DAVINCI ROBOT;  Surgeon: Luna Keyes MD;  Location: [facility] MAIN OR;  Service: Robotics - DaVinci;  Laterality: N/A;    GASTRIC SLEEVE LAPAROSCOPIC N/A 08/06/2019    Procedure: laparoscopic sleeve gastrectomy;  Surgeon: uLna Keyes MD;  Location: [facility] MAIN OR;  Service: Bariatric    TONSILLECTOMY AND ADENOIDECTOMY  2002                  Encounter Information        Visit Narrative     Patient had bypass but got pregnant soon after. Currently breastfeeding.     Diet Recall:   Breakfast: banana or eggs and sausage and biscuit   Lunch: sandwich or skips  Dinner: chicken/pork and veggies, mac and cheese, pasta sometimes with chicken and broccoli   Snacks: popcorn (skinny  "pop)  Beverages: water, sweet tea (has cut back, 1 every other day), protein shakes (1 per day chocolate muscle milk)    Exercise: walks in neighborhood, has not been exercising recently but going to add in. Possibly joining the [employer]    Supplements: MV every morning    Self Monitoring:          Anthropometrics        Current Height, Weight Height: 172.7 cm (68\")  Weight: 97.9 kg (215 lb 12.8 oz) (06/15/23 1327)       Trending Weight Hx  22.1% weight loss since bypass (03/2022)    Wt Readings from Last 30 Encounters:   06/15/23 1327 97.9 kg (215 lb 12.8 oz)   05/11/23 1228 99.2 kg (218 lb 11.1 oz)   05/01/23 1204 99.8 kg (220 lb)   04/28/23 1353 100 kg (220 lb 12.8 oz)   11/18/22 1250 114 kg (250 lb 9.6 oz)   09/19/22 1217 110 kg (243 lb 3.2 oz)   04/29/22 1411 115 kg (252 lb 12.8 oz)   04/06/22 1748 117 kg (257 lb 15 oz)   03/28/22 1128 122 kg (268 lb 3.2 oz)   03/26/22 1936 124 kg (272 lb 14.9 oz)   03/22/22 0612 125 kg (276 lb 3.2 oz)   01/28/22 1603 124 kg (273 lb)   01/17/22 1055 124 kg (274 lb 3.2 oz)   11/04/21 1246 119 kg (262 lb 5.6 oz)   10/29/21 1251 113 kg (250 lb)   10/20/21 1409 117 kg (257 lb)   09/15/21 1320 114 kg (252 lb 6.4 oz)   08/12/21 1103 113 kg (250 lb 3.2 oz)   08/02/21 1154 115 kg (254 lb 6.4 oz)   06/25/21 0851 109 kg (241 lb)   05/15/21 2131 112 kg (246 lb 0.5 oz)   11/11/19 1528 114 kg (252 lb 6.4 oz)   11/05/19 0634 117 kg (258 lb 9.6 oz)   11/01/19 1603 118 kg (260 lb)   11/01/19 1204 118 kg (260 lb 6.4 oz)   10/30/19 1534 120 kg (264 lb 5.3 oz)   09/09/19 1424 126 kg (278 lb 6.4 oz)   08/15/19 1418 132 kg (291 lb 6.4 oz)   08/12/19 1419 135 kg (297 lb 9.6 oz)   08/06/19 0847 (!) 139 kg (306 lb 3.5 oz)   07/26/19 1102 (!) 145 kg (320 lb 6 oz)   07/15/19 1200 (!) 146 kg (321 lb 3.2 oz)   06/24/19 1553 (!) 145 kg (320 lb)   06/04/19 1133 (!) 147 kg (323 lb 9.6 oz)      BMI kg/m2 Body mass index is 32.81 kg/m².       Nutrition Diagnosis         Nutrition Dx Statement Overweight/obesity RT " multifactorial biochemical, behavioral and environmental contributors to disease AEB BMI 32.81kg/m^2         Nutrition Intervention         Nutrition Intervention Nutrition education related to diet modification and physical activity      Monitor/Evaluation        New Goals Patient to exercise for 15-30 minutes 2-3 days each week  Patient to increase protein intake at breakfast  Patient to plan meals and not skip lunch       Total time spent with pt 30 minutes of which 30 minutes were spent on education.       Electronically signed by:  Wu Fortune RD  06/15/23 13:33 EDT

## 2023-06-15 ENCOUNTER — OFFICE VISIT (OUTPATIENT)
Dept: BARIATRICS/WEIGHT MGMT | Facility: CLINIC | Age: 33
End: 2023-06-15
Payer: MEDICAID

## 2023-06-15 VITALS — HEIGHT: 68 IN | WEIGHT: 215.8 LBS | BODY MASS INDEX: 32.71 KG/M2

## 2023-06-15 DIAGNOSIS — E66.9 OBESITY, CLASS I, BMI 30-34.9: Primary | ICD-10-CM

## 2023-06-15 PROBLEM — E66.811 OBESITY, CLASS I, BMI 30-34.9: Status: ACTIVE | Noted: 2021-12-22

## 2023-06-15 PROBLEM — E66.01 OBESITY, CLASS III, BMI 40-49.9 (MORBID OBESITY): Status: RESOLVED | Noted: 2019-07-11 | Resolved: 2023-06-15

## 2023-07-07 PROBLEM — K28.9 MARGINAL ULCER: Status: ACTIVE | Noted: 2023-07-07

## 2023-07-18 PROBLEM — D50.9 IRON DEFICIENCY ANEMIA, UNSPECIFIED: Status: ACTIVE | Noted: 2023-07-18

## 2023-07-25 ENCOUNTER — HOSPITAL ENCOUNTER (OUTPATIENT)
Dept: ONCOLOGY | Facility: HOSPITAL | Age: 33
Discharge: HOME | End: 2023-07-25
Admitting: NURSE PRACTITIONER
Payer: MEDICAID

## 2023-07-25 VITALS
SYSTOLIC BLOOD PRESSURE: 107 MMHG | HEART RATE: 70 BPM | DIASTOLIC BLOOD PRESSURE: 73 MMHG | OXYGEN SATURATION: 96 % | RESPIRATION RATE: 16 BRPM

## 2023-07-25 DIAGNOSIS — D50.9 IRON DEFICIENCY ANEMIA, UNSPECIFIED IRON DEFICIENCY ANEMIA TYPE: Primary | ICD-10-CM

## 2023-07-25 DIAGNOSIS — Z98.84 H/O GASTRIC BYPASS: ICD-10-CM

## 2023-07-25 PROCEDURE — 25010000002 IRON SUCROSE PER 1 MG: Performed by: NURSE PRACTITIONER

## 2023-07-25 PROCEDURE — 96374 THER/PROPH/DIAG INJ IV PUSH: CPT

## 2023-07-25 PROCEDURE — 96365 THER/PROPH/DIAG IV INF INIT: CPT

## 2023-07-25 RX ORDER — SODIUM CHLORIDE 9 MG/ML
250 INJECTION, SOLUTION INTRAVENOUS ONCE
Status: CANCELLED | OUTPATIENT
Start: 2023-07-27

## 2023-07-25 RX ORDER — SODIUM CHLORIDE 9 MG/ML
250 INJECTION, SOLUTION INTRAVENOUS ONCE
Status: COMPLETED | OUTPATIENT
Start: 2023-07-25 | End: 2023-07-25

## 2023-07-25 RX ADMIN — SODIUM CHLORIDE 200 MG: 900 INJECTION, SOLUTION INTRAVENOUS at 13:46

## 2023-07-25 RX ADMIN — SODIUM CHLORIDE 250 ML: 9 INJECTION, SOLUTION INTRAVENOUS at 13:46

## 2023-07-25 NOTE — PROGRESS NOTES
Pt here for venofer infusion. She denies having any complaints. Peripheral IV access obtained and infusion given without difficulty. Pt declined to stay for her 30 min post infusion observation period. She will return to center on 7/31/23 for next treatment.

## 2023-07-27 ENCOUNTER — HOSPITAL ENCOUNTER (OUTPATIENT)
Dept: ONCOLOGY | Facility: HOSPITAL | Age: 33
Discharge: HOME | End: 2023-07-27
Payer: MEDICAID

## 2023-07-27 VITALS — DIASTOLIC BLOOD PRESSURE: 67 MMHG | TEMPERATURE: 98.4 F | HEART RATE: 64 BPM | SYSTOLIC BLOOD PRESSURE: 103 MMHG

## 2023-07-27 DIAGNOSIS — D50.9 IRON DEFICIENCY ANEMIA, UNSPECIFIED IRON DEFICIENCY ANEMIA TYPE: Primary | ICD-10-CM

## 2023-07-27 DIAGNOSIS — Z98.84 H/O GASTRIC BYPASS: ICD-10-CM

## 2023-07-27 PROCEDURE — 25010000002 IRON SUCROSE PER 1 MG: Performed by: NURSE PRACTITIONER

## 2023-07-27 PROCEDURE — 96374 THER/PROPH/DIAG INJ IV PUSH: CPT

## 2023-07-27 PROCEDURE — 96365 THER/PROPH/DIAG IV INF INIT: CPT

## 2023-07-27 RX ORDER — SODIUM CHLORIDE 9 MG/ML
250 INJECTION, SOLUTION INTRAVENOUS ONCE
Status: CANCELLED | OUTPATIENT
Start: 2023-07-28

## 2023-07-27 RX ORDER — SODIUM CHLORIDE 9 MG/ML
250 INJECTION, SOLUTION INTRAVENOUS ONCE
Status: COMPLETED | OUTPATIENT
Start: 2023-07-27 | End: 2023-07-27

## 2023-07-27 RX ADMIN — SODIUM CHLORIDE 250 ML: 9 INJECTION, SOLUTION INTRAVENOUS at 12:32

## 2023-07-27 RX ADMIN — SODIUM CHLORIDE 200 MG: 900 INJECTION, SOLUTION INTRAVENOUS at 12:33

## 2023-07-31 ENCOUNTER — HOSPITAL ENCOUNTER (OUTPATIENT)
Dept: ONCOLOGY | Facility: HOSPITAL | Age: 33
Discharge: HOME | End: 2023-07-31
Admitting: NURSE PRACTITIONER
Payer: MEDICAID

## 2023-07-31 VITALS
DIASTOLIC BLOOD PRESSURE: 79 MMHG | OXYGEN SATURATION: 98 % | TEMPERATURE: 97.9 F | WEIGHT: 214 LBS | HEIGHT: 68 IN | RESPIRATION RATE: 18 BRPM | HEART RATE: 72 BPM | SYSTOLIC BLOOD PRESSURE: 115 MMHG | BODY MASS INDEX: 32.43 KG/M2

## 2023-07-31 DIAGNOSIS — D50.9 IRON DEFICIENCY ANEMIA, UNSPECIFIED IRON DEFICIENCY ANEMIA TYPE: Primary | ICD-10-CM

## 2023-07-31 DIAGNOSIS — Z98.84 H/O GASTRIC BYPASS: ICD-10-CM

## 2023-07-31 PROCEDURE — 96374 THER/PROPH/DIAG INJ IV PUSH: CPT

## 2023-07-31 PROCEDURE — 96365 THER/PROPH/DIAG IV INF INIT: CPT

## 2023-07-31 PROCEDURE — 25010000002 IRON SUCROSE PER 1 MG: Performed by: NURSE PRACTITIONER

## 2023-07-31 RX ORDER — SODIUM CHLORIDE 9 MG/ML
250 INJECTION, SOLUTION INTRAVENOUS ONCE
Status: COMPLETED | OUTPATIENT
Start: 2023-07-31 | End: 2023-07-31

## 2023-07-31 RX ORDER — SODIUM CHLORIDE 9 MG/ML
250 INJECTION, SOLUTION INTRAVENOUS ONCE
Status: CANCELLED | OUTPATIENT
Start: 2023-08-03

## 2023-07-31 RX ADMIN — SODIUM CHLORIDE 250 ML: 9 INJECTION, SOLUTION INTRAVENOUS at 13:00

## 2023-07-31 RX ADMIN — IRON SUCROSE 200 MG: 20 INJECTION, SOLUTION INTRAVENOUS at 13:01

## 2023-08-02 ENCOUNTER — HOSPITAL ENCOUNTER (OUTPATIENT)
Dept: ONCOLOGY | Facility: HOSPITAL | Age: 33
Discharge: HOME | End: 2023-08-02
Admitting: NURSE PRACTITIONER
Payer: MEDICAID

## 2023-08-02 VITALS
RESPIRATION RATE: 14 BRPM | DIASTOLIC BLOOD PRESSURE: 72 MMHG | OXYGEN SATURATION: 98 % | SYSTOLIC BLOOD PRESSURE: 105 MMHG | BODY MASS INDEX: 32.43 KG/M2 | TEMPERATURE: 97.6 F | HEIGHT: 68 IN | HEART RATE: 65 BPM | WEIGHT: 214 LBS

## 2023-08-02 DIAGNOSIS — Z98.84 H/O GASTRIC BYPASS: ICD-10-CM

## 2023-08-02 DIAGNOSIS — D50.9 IRON DEFICIENCY ANEMIA, UNSPECIFIED IRON DEFICIENCY ANEMIA TYPE: Primary | ICD-10-CM

## 2023-08-02 PROCEDURE — 25010000002 IRON SUCROSE PER 1 MG: Performed by: NURSE PRACTITIONER

## 2023-08-02 PROCEDURE — 96365 THER/PROPH/DIAG IV INF INIT: CPT

## 2023-08-02 PROCEDURE — 96374 THER/PROPH/DIAG INJ IV PUSH: CPT

## 2023-08-02 RX ORDER — SODIUM CHLORIDE 9 MG/ML
250 INJECTION, SOLUTION INTRAVENOUS ONCE
Status: CANCELLED | OUTPATIENT
Start: 2023-08-03

## 2023-08-02 RX ORDER — SODIUM CHLORIDE 9 MG/ML
250 INJECTION, SOLUTION INTRAVENOUS ONCE
Status: COMPLETED | OUTPATIENT
Start: 2023-08-02 | End: 2023-08-02

## 2023-08-02 RX ADMIN — SODIUM CHLORIDE 200 MG: 900 INJECTION, SOLUTION INTRAVENOUS at 11:41

## 2023-08-02 RX ADMIN — SODIUM CHLORIDE 250 ML: 9 INJECTION, SOLUTION INTRAVENOUS at 11:41

## 2023-08-16 NOTE — TELEPHONE ENCOUNTER
Alert Team:    Provided patient with resource list to assist with methamphetamine cessation. Encouraged patient to return to NA meetings; highlighted IOP an/rehab and sober living programs. Provided patient with flyer for [facility] and encouraged patient to go to facility if he starts to experiencing any SI or command hallucinations thus will meet inpt criteria d/t no inpt methamphetamine detox. No other questions or request at this time.   Patient had pre op on 1.17.22 but then got COVID and SX was RS. No pre op visit needed. Patient does need to come in for a weigh in however because she needs to maintain her last weight. Needs to do weight in the week of March 14

## 2023-10-06 ENCOUNTER — TELEPHONE (OUTPATIENT)
Dept: BARIATRICS/WEIGHT MGMT | Facility: CLINIC | Age: 33
End: 2023-10-06
Payer: MEDICAID

## 2023-10-06 NOTE — TELEPHONE ENCOUNTER
Rc'd call from pt regarding skin removal sx. Informed pt that would be performed by a plastic surgeon. Pt did stated she is stable with her weight loss. Pt was given phone numbers for [facility] and Dr. Taylor Tolentino to contact and sched an apt - both take pt's ins. Pt rosenda.

## 2023-10-06 NOTE — TELEPHONE ENCOUNTER
Patient called back and states both numbers that she called said they would require a referral from Dr Keyes. She does not really have a preference on provider but states she did like the lady who answered the phone at phone number [phone] better.

## 2024-05-13 ENCOUNTER — TRANSCRIBE ORDERS (OUTPATIENT)
Dept: ADMINISTRATIVE | Facility: HOSPITAL | Age: 34
End: 2024-05-13
Payer: MEDICAID

## 2024-05-13 ENCOUNTER — HOSPITAL ENCOUNTER (OUTPATIENT)
Dept: CARDIOLOGY | Facility: HOSPITAL | Age: 34
Discharge: HOME | End: 2024-05-13
Payer: MEDICAID

## 2024-05-13 DIAGNOSIS — R00.2 PALPITATIONS: Primary | ICD-10-CM

## 2024-05-13 DIAGNOSIS — R00.2 PALPITATIONS: ICD-10-CM

## 2024-05-13 LAB
QT INTERVAL: 409 MS
QTC INTERVAL: 443 MS

## 2024-05-13 PROCEDURE — 93005 ELECTROCARDIOGRAM TRACING: CPT

## 2024-12-16 ENCOUNTER — HOSPITAL ENCOUNTER (OUTPATIENT)
Facility: HOSPITAL | Age: 34
Discharge: HOME | End: 2024-12-16
Attending: EMERGENCY MEDICINE | Admitting: EMERGENCY MEDICINE
Payer: MEDICAID

## 2024-12-16 ENCOUNTER — APPOINTMENT (OUTPATIENT)
Dept: GENERAL RADIOLOGY | Facility: HOSPITAL | Age: 34
End: 2024-12-16
Payer: MEDICAID

## 2024-12-16 VITALS
HEIGHT: 68 IN | OXYGEN SATURATION: 98 % | DIASTOLIC BLOOD PRESSURE: 83 MMHG | SYSTOLIC BLOOD PRESSURE: 114 MMHG | RESPIRATION RATE: 18 BRPM | BODY MASS INDEX: 36.63 KG/M2 | TEMPERATURE: 98.7 F | HEART RATE: 66 BPM | WEIGHT: 241.7 LBS

## 2024-12-16 DIAGNOSIS — J20.9 ACUTE BRONCHITIS WITH BRONCHOSPASM: Primary | ICD-10-CM

## 2024-12-16 DIAGNOSIS — R05.1 ACUTE COUGH: ICD-10-CM

## 2024-12-16 LAB
FLUAV SUBTYP SPEC NAA+PROBE: NOT DETECTED
FLUBV RNA ISLT QL NAA+PROBE: NOT DETECTED
SARS-COV-2 RNA RESP QL NAA+PROBE: NOT DETECTED

## 2024-12-16 PROCEDURE — G0463 HOSPITAL OUTPT CLINIC VISIT: HCPCS | Performed by: NURSE PRACTITIONER

## 2024-12-16 PROCEDURE — 71046 X-RAY EXAM CHEST 2 VIEWS: CPT

## 2024-12-16 PROCEDURE — 87636 SARSCOV2 & INF A&B AMP PRB: CPT | Performed by: EMERGENCY MEDICINE

## 2024-12-16 RX ORDER — DEXAMETHASONE 4 MG/1
4 TABLET ORAL 3 TIMES DAILY
Qty: 9 TABLET | Refills: 0 | Status: SHIPPED | OUTPATIENT
Start: 2024-12-16 | End: 2024-12-19

## 2024-12-16 RX ORDER — ALBUTEROL SULFATE 90 UG/1
2 INHALANT RESPIRATORY (INHALATION) EVERY 4 HOURS PRN
Qty: 8 G | Refills: 0 | Status: SHIPPED | OUTPATIENT
Start: 2024-12-16 | End: 2024-12-23

## 2024-12-16 RX ORDER — BROMPHENIRAMINE MALEATE, PSEUDOEPHEDRINE HYDROCHLORIDE, AND DEXTROMETHORPHAN HYDROBROMIDE 2; 30; 10 MG/5ML; MG/5ML; MG/5ML
5 SYRUP ORAL 4 TIMES DAILY PRN
Qty: 118 ML | Refills: 0 | Status: SHIPPED | OUTPATIENT
Start: 2024-12-16 | End: 2024-12-21

## 2024-12-16 NOTE — FSED PROVIDER NOTE
Subjective   History of Present Illness  34 y.o. female who complains of coryza, congestion, cough described as dry, harsh, and productive of green sputum, suspected fevers but not measured at home, and hoarseness for 10 days. She denies a history of precordial chest pain. She denies a history of asthma. Patient does not smoke cigarettes. Denies ill contacts, but is a .       History provided by:  Patient      Review of Systems    Past Medical History:   Diagnosis Date    Anxiety     Depression     Fatigue     GERD (gastroesophageal reflux disease)     Heartburn     Iron deficiency anemia, unspecified 7/18/2023    Morbid obesity     PCOS (polycystic ovarian syndrome)        No Known Allergies    Past Surgical History:   Procedure Laterality Date    CHOLECYSTECTOMY N/A 11/05/2019    Procedure: CHOLECYSTECTOMY LAPAROSCOPIC;  Surgeon: Luna Keyes MD;  Location: [facility] MAIN OR;  Service: General    DILATATION AND CURETTAGE  2014 & 2019    ENDOSCOPY      ENDOSCOPY N/A 11/04/2021    Procedure: ESOPHAGOGASTRODUODENOSCOPY with biopsy x 2 areas;  Surgeon: Luna Keyes MD;  Location: [facility] ENDOSCOPY;  Service: General;  Laterality: N/A;  post op: esophagitis    ENDOSCOPY N/A 5/11/2023    Procedure: ESOPHAGOGASTRODUODENOSCOPY WITH BIOPSY X1 AREA;  Surgeon: Luna Keyes MD;  Location: [facility] ENDOSCOPY;  Service: General;  Laterality: N/A;  POST: GASTRIC ULCER    GASTRIC BYPASS N/A 03/22/2022    Procedure: REVISION GASTRIC BYPASS LAPAROSCOPIC WITH DAVINCI ROBOT;  Surgeon: Luna Keyes MD;  Location: [facility] MAIN OR;  Service: Robotics - DaVinci;  Laterality: N/A;    GASTRIC SLEEVE LAPAROSCOPIC N/A 08/06/2019    Procedure: laparoscopic sleeve gastrectomy;  Surgeon: Luna Keyes MD;  Location: [facility] MAIN OR;  Service: Bariatric    TONSILLECTOMY AND ADENOIDECTOMY  2002       Family History   Problem Relation Age of Onset    Obesity Sister     Diabetes type II Maternal Grandmother     Alzheimer's disease Maternal  Grandmother     Lung cancer Maternal Grandfather     Lung cancer Paternal Grandmother        Social History     Socioeconomic History    Marital status: Single   Tobacco Use    Smoking status: Never    Smokeless tobacco: Never   Vaping Use    Vaping status: Never Used   Substance and Sexual Activity    Alcohol use: Not Currently    Drug use: No    Sexual activity: Defer           Objective   Physical Exam  Vitals and nursing note reviewed.   Constitutional:       General: She is awake. She is not in acute distress.     Appearance: Normal appearance. She is well-developed and normal weight. She is not ill-appearing, toxic-appearing or diaphoretic.   HENT:      Head: Normocephalic and atraumatic.      Mouth/Throat:      Mouth: Mucous membranes are moist.      Pharynx: Oropharynx is clear.   Eyes:      Extraocular Movements: Extraocular movements intact.      Conjunctiva/sclera: Conjunctivae normal.      Pupils: Pupils are equal, round, and reactive to light.   Cardiovascular:      Rate and Rhythm: Normal rate and regular rhythm.      Pulses: Normal pulses.           Radial pulses are 2+ on the right side and 2+ on the left side.        Dorsalis pedis pulses are 2+ on the right side and 2+ on the left side.        Posterior tibial pulses are 2+ on the right side and 2+ on the left side.      Heart sounds: Normal heart sounds, S1 normal and S2 normal. Heart sounds not distant. No murmur heard.     No friction rub. No gallop.   Pulmonary:      Effort: Pulmonary effort is normal. No respiratory distress.      Breath sounds: Normal breath sounds and air entry. No wheezing or rales.   Chest:      Chest wall: No tenderness.   Abdominal:      General: Bowel sounds are normal. There is no distension.      Palpations: Abdomen is soft. Abdomen is not rigid. There is no mass.      Tenderness: There is no abdominal tenderness. There is no guarding or rebound.      Hernia: No hernia is present.   Musculoskeletal:      Cervical  "back: Normal range of motion and neck supple.   Skin:     General: Skin is warm and dry.      Capillary Refill: Capillary refill takes less than 2 seconds.      Coloration: Skin is not pale.      Findings: No erythema or rash.   Neurological:      Mental Status: She is alert and oriented to person, place, and time.   Psychiatric:         Behavior: Behavior is cooperative.         Procedures           ED Course                                           Medical Decision Making  Interpreted by radiologist as below:     XR Chest PA & Lateral    Result Date: 12/16/2024  Impression: No acute cardiopulmonary abnormality. Electronically Signed: Destiny Carreon MD  12/16/2024 3:06 PM EST  Workstation ID: [id number]       /83 (BP Location: Right arm, Patient Position: Sitting)   Pulse 66   Temp 98.7 °F (37.1 °C)   Resp 18   Ht 172.7 cm (68\")   Wt 110 kg (241 lb 11.2 oz)   LMP 12/10/2024 (Approximate)   SpO2 98%   BMI 36.75 kg/m²      Lab Results (last 24 hours)       Procedure Component Value Units Date/Time    COVID-19 and FLU A/B PCR, 1 HR TAT - Swab, Nasopharynx [[id number]]  (Normal) Collected: 12/16/24 1445    Specimen: Swab from Nasopharynx Updated: 12/16/24 1530     COVID19 Not Detected     Influenza A PCR Not Detected     Influenza B PCR Not Detected    Narrative:      Fact sheet for providers: https://www.fda.gov/media/529097/download    Fact sheet for patients: https://www.fda.gov/media/755359/download    Test performed by PCR.             Medications - No data to display     Appropriate PPE worn during patient exam.  Appropriate monitoring initiated. Patient is alert and oriented x3.  No acute distress noted. Vitals as noted above.  Appearance: alert, well appearing, and in no distress, oriented to person, place, and time, overweight, and well hydrated.   ENT- ENT exam normal, no neck nodes or sinus tenderness.   Chest - clear to auscultation, no wheezes, rales or rhonchi, symmetric air entry.  COVID flu " swabs obtained per triage protocol found to be negative.  Chest x-ray obtained with the above findings, no pneumonia.  Patient was given prescriptions for albuterol inhaler, Bromfed-DM, and Decadron.  Symptomatic therapy suggested: push fluids, rest, use vaporizer or mist prn, and use acetaminophen, ibuprofen prn.     I reviewed chart 12/15/2024 patient was seen by telehealth for acute respiratory infection. My radiology interpretation of chest x-ray shows no cardiomegaly pulmonary edema infiltrate. Differential Diagnoses, not all-inclusive and does not constitute entirety of all causes: URI, bronchitis, pneumonia.  URI ruled out by viral panel.  Bronchitis determined by H&P.  Pneumonia ruled out but no rales on exam, afebrile, and chest x-ray    Disposition/Treatment: Discussed results with patient, verbalized understanding. Discussed reasons to return, patient verbalized understanding. Agreeable with plan of care. Patient was stable upon discharge.    Upon reassessment, patient is flesh tone warm and dry no acute distress noted.  Vital signs are stable.    Part of this note may be an electronic transcription/translation of spoken language to printed text using the Dragon Dictation System.   Final diagnoses:   Acute bronchitis with bronchospasm   Acute cough       ED Disposition  ED Disposition       ED Disposition   Discharge    Condition   Stable    Comment   --               Gadiel Barrett MD  [address] [place] [address] [place] [address] [phone]    Schedule an appointment as soon as possible for a visit       [address] [address] [phone]  Go to   If symptoms worsen         Medication List        New Prescriptions      albuterol sulfate  (90 Base) MCG/ACT inhaler  Commonly known as: PROVENTIL HFA;VENTOLIN HFA;PROAIR HFA  Inhale 2 puffs Every 4 (Four) Hours As Needed (cough) for up to 7 days.      brompheniramine-pseudoephedrine-DM 30-2-10 MG/5ML syrup  Take 5 mL by mouth 4 (Four) Times a Day As Needed for Allergies for up to 5 days.     dexAMETHasone 4 MG tablet  Commonly known as: DECADRON  Take 1 tablet by mouth 3 (Three) Times a Day for 3 days.               Where to Get Your Medications        These medications were sent to [facility] [id number] - [place], [place] - [address] [facility] [facility] - [phone]  - [phone] FX  [address] [facility] [place] [address]      Phone: [phone]   albuterol sulfate  (90 Base) MCG/ACT inhaler  brompheniramine-pseudoephedrine-DM 30-2-10 MG/5ML syrup  dexAMETHasone 4 MG tablet

## 2024-12-16 NOTE — DISCHARGE INSTRUCTIONS
Take medications as prescribed. Push fluids and rest. Perform sinus rinse per hand out with sterile water. Cover cough with elbow and practice good hand-washing. Cool mist humidifier at bedtime. Wipe hard surfaces with Lysol and/or diluted bleach. Continue home medication regimen. Schedule follow up with primary care for recheck. Call urgent care as needed. Go to ED for any new or worsening symptoms.

## 2025-02-03 ENCOUNTER — TELEPHONE (OUTPATIENT)
Dept: BARIATRICS/WEIGHT MGMT | Facility: CLINIC | Age: 35
End: 2025-02-03
Payer: MEDICAID

## 2025-02-03 NOTE — TELEPHONE ENCOUNTER
Left  at [phone] for pt to call office/pt no show for annual visit bypass 3.22.22 on 2.3.25 with NP

## 2025-02-13 ENCOUNTER — PREP FOR SURGERY (OUTPATIENT)
Dept: OTHER | Facility: HOSPITAL | Age: 35
End: 2025-02-13
Payer: MEDICAID

## 2025-02-13 ENCOUNTER — OFFICE VISIT (OUTPATIENT)
Dept: BARIATRICS/WEIGHT MGMT | Facility: CLINIC | Age: 35
End: 2025-02-13
Payer: MEDICAID

## 2025-02-13 VITALS
DIASTOLIC BLOOD PRESSURE: 74 MMHG | OXYGEN SATURATION: 100 % | HEART RATE: 61 BPM | BODY MASS INDEX: 35.24 KG/M2 | HEIGHT: 68 IN | SYSTOLIC BLOOD PRESSURE: 121 MMHG | WEIGHT: 232.5 LBS | RESPIRATION RATE: 18 BRPM

## 2025-02-13 DIAGNOSIS — R63.5 WEIGHT GAIN FOLLOWING GASTRIC BYPASS SURGERY: ICD-10-CM

## 2025-02-13 DIAGNOSIS — E66.812 OBESITY, CLASS II, BMI 35-39.9: Primary | ICD-10-CM

## 2025-02-13 DIAGNOSIS — Z98.84 WEIGHT GAIN FOLLOWING GASTRIC BYPASS SURGERY: ICD-10-CM

## 2025-02-13 DIAGNOSIS — Z98.84 H/O GASTRIC BYPASS: ICD-10-CM

## 2025-02-13 RX ORDER — SODIUM FLUORIDE 5 MG/G
GEL, DENTIFRICE DENTAL
COMMUNITY

## 2025-02-13 RX ORDER — HYDROXYZINE PAMOATE 50 MG/1
50 CAPSULE ORAL
COMMUNITY
Start: 2024-11-22

## 2025-02-13 RX ORDER — SODIUM CHLORIDE 0.9 % (FLUSH) 0.9 %
10 SYRINGE (ML) INJECTION AS NEEDED
OUTPATIENT
Start: 2025-02-13

## 2025-02-13 RX ORDER — SODIUM CHLORIDE 9 MG/ML
30 INJECTION, SOLUTION INTRAVENOUS CONTINUOUS PRN
OUTPATIENT
Start: 2025-02-13 | End: 2025-02-13

## 2025-02-13 RX ORDER — LUMATEPERONE 21 MG/1
CAPSULE ORAL
COMMUNITY
Start: 2025-01-10

## 2025-02-13 NOTE — PROGRESS NOTES
[facility]  [address] [place] [address]  [address] [place] [address]  Dept: [phone]  2/13/2025      Gwen Barnes.  [id number]  [id number]  1990  female    Date of last surgery: 5/11/2023Esophagogastroduodenoscopy With Biopsy X1 Area      Chief Complaint:  Post-Op Bariatric Surgery:   Gwen Barnes is status post procedure listed above  HPI:     Wt Readings from Last 10 Encounters:   02/13/25 105 kg (232 lb 8 oz)   12/16/24 110 kg (241 lb 11.2 oz)   08/02/23 97.1 kg (214 lb)   07/31/23 97.1 kg (214 lb)   07/21/23 97.5 kg (215 lb)   06/15/23 97.9 kg (215 lb 12.8 oz)   05/11/23 99.2 kg (218 lb 11.1 oz)   04/28/23 100 kg (220 lb 12.8 oz)   11/18/22 114 kg (250 lb 9.6 oz)   09/19/22 110 kg (243 lb 3.2 oz)        Today's weight is 105 kg (232 lb 8 oz) pounds,BMI 35.35 has a  gain of 17 pounds since the last visit and weight loss since surgery is 44 pounds. The patient reports a decreased portion size and loss of appetite.      Gwen Barnes denies reflux/heartburn, nausea, and vomiting     Diet and Exercise: Diet history reviewed and discussed with the patient. Weight loss/gains to date discussed with the patient.     She reports eating 3 meals per day, a typical portion size of no restriction without the compounded semaglutide, 2 cups , eating 1-2 snacks per day, drinking 8 or more 8-oz. glasses of water per day, no carbonated beverage consumption and exercising regularly.       The patient states they are eating 40 grams of protein per day.     Breakfast: granola bar , cereal   Lunch: meat, cheese, crackers   Dinner:salmon, corn, green beans   Snacks; veggie chips   Drinks:no carbonation , some gaterade, un sweet tea / sweet tea - trying to cut back on sweet tea   Exercise: [employer] membership going here or there     PCP prescribing compounded semaglutide current dose 0.5 mg weekly     No multi vitamin currently        Review of Systems   Constitutional:  Positive for activity change, appetite change and fatigue.   Respiratory: Negative.     Cardiovascular: Negative.    Gastrointestinal: Negative.          Patient Active Problem List   Diagnosis    Encounter for other preprocedural examination    Heartburn    Malaise and fatigue    Polycystic ovaries    Morbid (severe) obesity due to excess calories    Gastroesophageal reflux disease    Recurrent major depressive disorder, in partial remission    Generalized anxiety disorder    Morbid obesity    Obesity, Class I, BMI 30-34.9    H/O gastric bypass    Pain of upper abdomen    Marginal ulcer    Iron deficiency anemia, unspecified       Past Medical History:   Diagnosis Date    Anxiety     Depression     Fatigue     GERD (gastroesophageal reflux disease)     Heartburn     Iron deficiency anemia, unspecified 7/18/2023    Morbid obesity     PCOS (polycystic ovarian syndrome)      Past Surgical History:   Procedure Laterality Date    TONSILLECTOMY AND ADENOIDECTOMY  2002    GASTRIC SLEEVE LAPAROSCOPIC N/A 08/06/2019    Procedure: laparoscopic sleeve gastrectomy;  Surgeon: Luna Keyes MD;  Location: [facility] MAIN OR;  Service: Bariatric    CHOLECYSTECTOMY N/A 11/05/2019    Procedure: CHOLECYSTECTOMY LAPAROSCOPIC;  Surgeon: Luna Keyes MD;  Location: [facility] MAIN OR;  Service: General    ENDOSCOPY N/A 11/04/2021    Procedure: ESOPHAGOGASTRODUODENOSCOPY with biopsy x 2 areas;  Surgeon: Luna Keyes MD;  Location: [facility] ENDOSCOPY;  Service: General;  Laterality: N/A;  post op: esophagitis    GASTRIC BYPASS N/A 03/22/2022    Procedure: REVISION GASTRIC BYPASS LAPAROSCOPIC WITH DAVINCI ROBOT;  Surgeon: Luna Keyes MD;  Location: [facility] MAIN OR;  Service: Robotics - DaVinci;  Laterality: N/A;    ENDOSCOPY N/A 5/11/2023    Procedure: ESOPHAGOGASTRODUODENOSCOPY WITH BIOPSY X1 AREA;  Surgeon: Luna Keyes MD;  Location: [facility] ENDOSCOPY;  Service: General;  Laterality: N/A;  POST: GASTRIC  ULCER    DILATATION AND CURETTAGE  2014 & 2019    ENDOSCOPY        The following portions of the patient's history were reviewed and updated as appropriate: allergies, current medications, past medical history, past social history, past surgical history, and problem list.    Vitals:    02/13/25 1135   BP: 121/74   Pulse: 61   Resp: 18   SpO2: 100%       Physical Exam  Constitutional:       Appearance: Normal appearance. She is obese.   Pulmonary:      Effort: Pulmonary effort is normal.   Abdominal:      General: Abdomen is flat.   Neurological:      General: No focal deficit present.      Mental Status: She is alert and oriented to person, place, and time.   Psychiatric:         Mood and Affect: Mood normal.         Behavior: Behavior normal.         Thought Content: Thought content normal.         Judgment: Judgment normal.             Assessment:   BMI 35.35, class 2 obesity , 3 yr gastric bypass   Post-op, the patient is struggling with weight loss. She originally had a gastric sleeve in 2019 but states not long after her gastric sleeve, her father unexpectedly passed away and she struggled mentally with his death. As a result, she gained some weight back but also developed a lot of gerd. She had a conversion to gastric bypass in 2022. Around 3 months after her gastric bypass, states she got pregnant and has really struggled to loose weight since then. She was placed on compounded semaglutide by her pcp and states this has helped some with portion size but has not seen a lot of changes with her weight. She is currently on the 0.5 mg weekly dosage. PT is here today to discuss other weight loss options. She is not getting her protein in. Encouraged her to try and get 60+ grams of protein in her diet a day including 1 protein shake/ supplement a day. Also encourage 20-30 minutes of exercise 2-3 days a week including strength training. Pt is going to the gym but not consistently. She also does state she has a mental  health provider for whom she has been seeing for several years. Pt did inquire about other bariatric surgery options. She did state she feels like she eats larger portion sizes specifically when she is not on the compounded semaglutide. States it is almost like she has not restriction. States she had the most restriction right after gastric sleeve. I spoke with the pt about possible overstitch if her pouch side has stretched. Plan to start with EGD and new blood work. Will bring in to discuss findings and options further with DR. Keyes after EGD. Plan to follow up for EGD and after EGD.     Plan:     Encouraged patient to be sure to get plenty of lean protein per day through small frequent meals all with a protein source.   Activity restrictions: none.   Recommended patient be sure to get at least 70 grams of protein per day by eating small, frequent meals all with high lean protein choices. Be sure to limit/cut back on daily carbohydrate intake. Discussed with the patient the recommended amount of water per day to intake- half of body weight in ounces. Reviewed vitamin requirements. Be sure to do routine exercise, 150 minutes per week minimum, including both cardio and strength training.     Instructions / Recommendations: dietary counseling recommended, recommended a daily protein intake of  grams, vitamin supplement(s) recommended, recommended exercising at least 150 minutes per week, behavior modifications recommended and instructed to call the office for concerns, questions, or problems.     The patient was instructed to follow up for EGD and after EGD with .     The patient was counseled regarding diet and exercise/ bariatric surgery options post bypass. Total time spent face to face was 30 minutes and 15 minutes was spent counseling.     LA Epps  [facility]

## 2025-02-13 NOTE — H&P (VIEW-ONLY)
[facility] BARIATRIC SURGERY  [address] [place] [address]  [address] [place] [address]  Dept: [phone]  2/13/2025      Gwen Barnes.  [id number]  [id number]  1990  female    Date of last surgery: 5/11/2023Esophagogastroduodenoscopy With Biopsy X1 Area      Chief Complaint:  Post-Op Bariatric Surgery:   Gwen Barnes is status post procedure listed above  HPI:     Wt Readings from Last 10 Encounters:   02/13/25 105 kg (232 lb 8 oz)   12/16/24 110 kg (241 lb 11.2 oz)   08/02/23 97.1 kg (214 lb)   07/31/23 97.1 kg (214 lb)   07/21/23 97.5 kg (215 lb)   06/15/23 97.9 kg (215 lb 12.8 oz)   05/11/23 99.2 kg (218 lb 11.1 oz)   04/28/23 100 kg (220 lb 12.8 oz)   11/18/22 114 kg (250 lb 9.6 oz)   09/19/22 110 kg (243 lb 3.2 oz)        Today's weight is 105 kg (232 lb 8 oz) pounds,BMI 35.35 has a  gain of 17 pounds since the last visit and weight loss since surgery is 44 pounds. The patient reports a decreased portion size and loss of appetite.      Gwen Barnes denies reflux/heartburn, nausea, and vomiting     Diet and Exercise: Diet history reviewed and discussed with the patient. Weight loss/gains to date discussed with the patient.     She reports eating 3 meals per day, a typical portion size of no restriction without the compounded semaglutide, 2 cups , eating 1-2 snacks per day, drinking 8 or more 8-oz. glasses of water per day, no carbonated beverage consumption and exercising regularly.       The patient states they are eating 40 grams of protein per day.     Breakfast: granola bar , cereal   Lunch: meat, cheese, crackers   Dinner:salmon, corn, green beans   Snacks; veggie chips   Drinks:no carbonation , some gaterade, un sweet tea / sweet tea - trying to cut back on sweet tea   Exercise: [employer] membership going here or there     PCP prescribing compounded semaglutide current dose 0.5 mg weekly     No multi vitamin currently        Review of Systems   Constitutional:  Positive for activity change, appetite change and fatigue.   Respiratory: Negative.     Cardiovascular: Negative.    Gastrointestinal: Negative.          Patient Active Problem List   Diagnosis    Encounter for other preprocedural examination    Heartburn    Malaise and fatigue    Polycystic ovaries    Morbid (severe) obesity due to excess calories    Gastroesophageal reflux disease    Recurrent major depressive disorder, in partial remission    Generalized anxiety disorder    Morbid obesity    Obesity, Class I, BMI 30-34.9    H/O gastric bypass    Pain of upper abdomen    Marginal ulcer    Iron deficiency anemia, unspecified       Past Medical History:   Diagnosis Date    Anxiety     Depression     Fatigue     GERD (gastroesophageal reflux disease)     Heartburn     Iron deficiency anemia, unspecified 7/18/2023    Morbid obesity     PCOS (polycystic ovarian syndrome)      Past Surgical History:   Procedure Laterality Date    TONSILLECTOMY AND ADENOIDECTOMY  2002    GASTRIC SLEEVE LAPAROSCOPIC N/A 08/06/2019    Procedure: laparoscopic sleeve gastrectomy;  Surgeon: Luna Keyes MD;  Location: [facility] MAIN OR;  Service: Bariatric    CHOLECYSTECTOMY N/A 11/05/2019    Procedure: CHOLECYSTECTOMY LAPAROSCOPIC;  Surgeon: Luna Keyes MD;  Location: [facility] MAIN OR;  Service: General    ENDOSCOPY N/A 11/04/2021    Procedure: ESOPHAGOGASTRODUODENOSCOPY with biopsy x 2 areas;  Surgeon: Luna Keyes MD;  Location: [facility] ENDOSCOPY;  Service: General;  Laterality: N/A;  post op: esophagitis    GASTRIC BYPASS N/A 03/22/2022    Procedure: REVISION GASTRIC BYPASS LAPAROSCOPIC WITH DAVINCI ROBOT;  Surgeon: Luna Keyes MD;  Location: [facility] MAIN OR;  Service: Robotics - DaVinci;  Laterality: N/A;    ENDOSCOPY N/A 5/11/2023    Procedure: ESOPHAGOGASTRODUODENOSCOPY WITH BIOPSY X1 AREA;  Surgeon: Luna Keyes MD;  Location: [facility] ENDOSCOPY;  Service: General;  Laterality: N/A;  POST: GASTRIC  ULCER    DILATATION AND CURETTAGE  2014 & 2019    ENDOSCOPY        The following portions of the patient's history were reviewed and updated as appropriate: allergies, current medications, past medical history, past social history, past surgical history, and problem list.    Vitals:    02/13/25 1135   BP: 121/74   Pulse: 61   Resp: 18   SpO2: 100%       Physical Exam  Constitutional:       Appearance: Normal appearance. She is obese.   Pulmonary:      Effort: Pulmonary effort is normal.   Abdominal:      General: Abdomen is flat.   Neurological:      General: No focal deficit present.      Mental Status: She is alert and oriented to person, place, and time.   Psychiatric:         Mood and Affect: Mood normal.         Behavior: Behavior normal.         Thought Content: Thought content normal.         Judgment: Judgment normal.             Assessment:   BMI 35.35, class 2 obesity , 3 yr gastric bypass   Post-op, the patient is struggling with weight loss. She originally had a gastric sleeve in 2019 but states not long after her gastric sleeve, her father unexpectedly passed away and she struggled mentally with his death. As a result, she gained some weight back but also developed a lot of gerd. She had a conversion to gastric bypass in 2022. Around 3 months after her gastric bypass, states she got pregnant and has really struggled to loose weight since then. She was placed on compounded semaglutide by her pcp and states this has helped some with portion size but has not seen a lot of changes with her weight. She is currently on the 0.5 mg weekly dosage. PT is here today to discuss other weight loss options. She is not getting her protein in. Encouraged her to try and get 60+ grams of protein in her diet a day including 1 protein shake/ supplement a day. Also encourage 20-30 minutes of exercise 2-3 days a week including strength training. Pt is going to the gym but not consistently. She also does state she has a mental  health provider for whom she has been seeing for several years. Pt did inquire about other bariatric surgery options. She did state she feels like she eats larger portion sizes specifically when she is not on the compounded semaglutide. States it is almost like she has not restriction. States she had the most restriction right after gastric sleeve. I spoke with the pt about possible overstitch if her pouch side has stretched. Plan to start with EGD and new blood work. Will bring in to discuss findings and options further with DR. Keyes after EGD. Plan to follow up for EGD and after EGD.     Plan:     Encouraged patient to be sure to get plenty of lean protein per day through small frequent meals all with a protein source.   Activity restrictions: none.   Recommended patient be sure to get at least 70 grams of protein per day by eating small, frequent meals all with high lean protein choices. Be sure to limit/cut back on daily carbohydrate intake. Discussed with the patient the recommended amount of water per day to intake- half of body weight in ounces. Reviewed vitamin requirements. Be sure to do routine exercise, 150 minutes per week minimum, including both cardio and strength training.     Instructions / Recommendations: dietary counseling recommended, recommended a daily protein intake of  grams, vitamin supplement(s) recommended, recommended exercising at least 150 minutes per week, behavior modifications recommended and instructed to call the office for concerns, questions, or problems.     The patient was instructed to follow up for EGD and after EGD with .     The patient was counseled regarding diet and exercise/ bariatric surgery options post bypass. Total time spent face to face was 30 minutes and 15 minutes was spent counseling.     LA Epps  [facility] Bariatrics

## 2025-03-03 ENCOUNTER — HOSPITAL ENCOUNTER (OUTPATIENT)
Facility: HOSPITAL | Age: 35
Setting detail: HOSPITAL OUTPATIENT SURGERY
Discharge: HOME | End: 2025-03-03
Attending: SURGERY | Admitting: SURGERY
Payer: MEDICAID

## 2025-03-03 ENCOUNTER — ANESTHESIA EVENT (OUTPATIENT)
Dept: GASTROENTEROLOGY | Facility: HOSPITAL | Age: 35
End: 2025-03-03
Payer: MEDICAID

## 2025-03-03 ENCOUNTER — ANESTHESIA (OUTPATIENT)
Dept: GASTROENTEROLOGY | Facility: HOSPITAL | Age: 35
End: 2025-03-03
Payer: MEDICAID

## 2025-03-03 VITALS
RESPIRATION RATE: 21 BRPM | HEART RATE: 51 BPM | SYSTOLIC BLOOD PRESSURE: 106 MMHG | DIASTOLIC BLOOD PRESSURE: 65 MMHG | HEIGHT: 68 IN | TEMPERATURE: 98.6 F | BODY MASS INDEX: 34.86 KG/M2 | WEIGHT: 230 LBS | OXYGEN SATURATION: 100 %

## 2025-03-03 DIAGNOSIS — Z98.84 H/O GASTRIC BYPASS: ICD-10-CM

## 2025-03-03 DIAGNOSIS — E66.812 OBESITY, CLASS II, BMI 35-39.9: ICD-10-CM

## 2025-03-03 DIAGNOSIS — Z98.84 WEIGHT GAIN FOLLOWING GASTRIC BYPASS SURGERY: ICD-10-CM

## 2025-03-03 DIAGNOSIS — R63.5 WEIGHT GAIN FOLLOWING GASTRIC BYPASS SURGERY: ICD-10-CM

## 2025-03-03 LAB — B-HCG UR QL: NEGATIVE

## 2025-03-03 PROCEDURE — 25010000002 PROPOFOL 1000 MG/100ML EMULSION: Performed by: NURSE ANESTHETIST, CERTIFIED REGISTERED

## 2025-03-03 PROCEDURE — 88305 TISSUE EXAM BY PATHOLOGIST: CPT | Performed by: SURGERY

## 2025-03-03 PROCEDURE — 81025 URINE PREGNANCY TEST: CPT | Performed by: SURGERY

## 2025-03-03 PROCEDURE — 25010000002 LIDOCAINE PF 2% 2 % SOLUTION: Performed by: NURSE ANESTHETIST, CERTIFIED REGISTERED

## 2025-03-03 PROCEDURE — 25810000003 SODIUM CHLORIDE 0.9 % SOLUTION: Performed by: NURSE ANESTHETIST, CERTIFIED REGISTERED

## 2025-03-03 PROCEDURE — 43239 EGD BIOPSY SINGLE/MULTIPLE: CPT | Performed by: SURGERY

## 2025-03-03 RX ORDER — EPHEDRINE SULFATE 5 MG/ML
5 INJECTION INTRAVENOUS ONCE AS NEEDED
Status: DISCONTINUED | OUTPATIENT
Start: 2025-03-03 | End: 2025-03-03 | Stop reason: HOSPADM

## 2025-03-03 RX ORDER — DIPHENHYDRAMINE HYDROCHLORIDE 50 MG/ML
12.5 INJECTION INTRAMUSCULAR; INTRAVENOUS
Status: DISCONTINUED | OUTPATIENT
Start: 2025-03-03 | End: 2025-03-03 | Stop reason: HOSPADM

## 2025-03-03 RX ORDER — HYDRALAZINE HYDROCHLORIDE 20 MG/ML
5 INJECTION INTRAMUSCULAR; INTRAVENOUS
Status: DISCONTINUED | OUTPATIENT
Start: 2025-03-03 | End: 2025-03-03 | Stop reason: HOSPADM

## 2025-03-03 RX ORDER — ONDANSETRON 2 MG/ML
4 INJECTION INTRAMUSCULAR; INTRAVENOUS ONCE AS NEEDED
Status: DISCONTINUED | OUTPATIENT
Start: 2025-03-03 | End: 2025-03-03 | Stop reason: HOSPADM

## 2025-03-03 RX ORDER — LIDOCAINE HYDROCHLORIDE 20 MG/ML
INJECTION, SOLUTION EPIDURAL; INFILTRATION; INTRACAUDAL; PERINEURAL AS NEEDED
Status: DISCONTINUED | OUTPATIENT
Start: 2025-03-03 | End: 2025-03-03 | Stop reason: SURG

## 2025-03-03 RX ORDER — IPRATROPIUM BROMIDE AND ALBUTEROL SULFATE 2.5; .5 MG/3ML; MG/3ML
3 SOLUTION RESPIRATORY (INHALATION) ONCE AS NEEDED
Status: DISCONTINUED | OUTPATIENT
Start: 2025-03-03 | End: 2025-03-03 | Stop reason: HOSPADM

## 2025-03-03 RX ORDER — SODIUM CHLORIDE 9 MG/ML
INJECTION, SOLUTION INTRAVENOUS CONTINUOUS PRN
Status: DISCONTINUED | OUTPATIENT
Start: 2025-03-03 | End: 2025-03-03 | Stop reason: SURG

## 2025-03-03 RX ORDER — PROPOFOL 10 MG/ML
INJECTION, EMULSION INTRAVENOUS AS NEEDED
Status: DISCONTINUED | OUTPATIENT
Start: 2025-03-03 | End: 2025-03-03 | Stop reason: SURG

## 2025-03-03 RX ORDER — LABETALOL HYDROCHLORIDE 5 MG/ML
5 INJECTION, SOLUTION INTRAVENOUS
Status: DISCONTINUED | OUTPATIENT
Start: 2025-03-03 | End: 2025-03-03 | Stop reason: HOSPADM

## 2025-03-03 RX ORDER — MEPERIDINE HYDROCHLORIDE 25 MG/ML
12.5 INJECTION INTRAMUSCULAR; INTRAVENOUS; SUBCUTANEOUS
Status: DISCONTINUED | OUTPATIENT
Start: 2025-03-03 | End: 2025-03-03 | Stop reason: HOSPADM

## 2025-03-03 RX ADMIN — SODIUM CHLORIDE: 9 INJECTION, SOLUTION INTRAVENOUS at 14:10

## 2025-03-03 RX ADMIN — PROPOFOL INJECTABLE EMULSION 150 MG: 10 INJECTION, EMULSION INTRAVENOUS at 14:12

## 2025-03-03 RX ADMIN — LIDOCAINE HYDROCHLORIDE 50 MG: 20 INJECTION, SOLUTION EPIDURAL; INFILTRATION; INTRACAUDAL; PERINEURAL at 14:12

## 2025-03-03 NOTE — DISCHARGE INSTRUCTIONS
A responsible adult should stay with you and you should rest quietly for the rest of the day.    Do not drink alcohol, drive, operate any heavy machinery or power tools or make any legal/important decisions for the next 24 hours.     Progress your diet as tolerated.  If you begin to experience severe pain, increased shortness of breath, racing heartbeat or a fever above 101 F, seek immediate medical attention.     Follow up with MD as instructed. Call office for results in 3 to 5 days if needed. [phone]  A responsible adult should stay with you and you should rest quietly for the rest of the day.    Do not drink alcohol, drive, operate any heavy machinery or power tools or make any legal/important decisions for the next 24 hours.     Progress your diet as tolerated.  If you begin to experience severe pain, increased shortness of breath, racing heartbeat or a fever above 101 F, seek immediate medical attention.     Follow up with MD as instructed. Call office for results in 3 to 5 days if needed.     Dr Keyes's Office at   [phone]

## 2025-03-03 NOTE — ANESTHESIA POSTPROCEDURE EVALUATION
Patient: Gwen Barnes    Procedure Summary       Date: 03/03/25 Room / Location: [facility] ENDOSCOPY 4 / [facility] ENDOSCOPY    Anesthesia Start: 1410 Anesthesia Stop: 1421    Procedure: ESOPHAGOGASTRODUODENOSCOPY WITH BIOPSY X1 AREA Diagnosis:       Obesity, Class II, BMI 35-39.9      H/O gastric bypass      Weight gain following gastric bypass surgery      (Obesity, Class II, BMI 35-39.9 [E66.812])      (H/O gastric bypass [Z98.84])      (Weight gain following gastric bypass surgery [R63.5, Z98.84])    Surgeons: Luna Keyes MD Provider: Ralph Diaz MD    Anesthesia Type: MAC ASA Status: 2            Anesthesia Type: MAC    Vitals  Vitals Value Taken Time   /65 03/03/25 1442   Temp     Pulse 46 03/03/25 1446   Resp 21 03/03/25 1440   SpO2 100 % 03/03/25 1446   Vitals shown include unfiled device data.        Post Anesthesia Care and Evaluation    Patient location during evaluation: PACU  Patient participation: complete - patient participated  Level of consciousness: awake  Pain scale: See nurse's notes for pain score.  Pain management: adequate    Airway patency: patent  Anesthetic complications: No anesthetic complications  PONV Status: none  Cardiovascular status: acceptable  Respiratory status: acceptable and spontaneous ventilation  Hydration status: acceptable    Comments: Patient seen and examined postoperatively; vital signs stable; SpO2 greater than or equal to 90%; cardiopulmonary status stable; nausea/vomiting adequately controlled; pain adequately controlled; no apparent anesthesia complications; patient discharged from anesthesia care when discharge criteria were met

## 2025-03-03 NOTE — ANESTHESIA PREPROCEDURE EVALUATION
Anesthesia Evaluation     NPO Solid Status: > 8 hours  NPO Liquid Status: > 8 hours           Airway   Mallampati: II  TM distance: >3 FB  Neck ROM: full  No difficulty expected  Dental - normal exam     Pulmonary - normal exam   Cardiovascular - normal exam        Neuro/Psych  (+) psychiatric history Anxiety  GI/Hepatic/Renal/Endo    (+) obesity, GERD    Musculoskeletal     Abdominal  - normal exam    Bowel sounds: normal.   Substance History      OB/GYN          Other                    Anesthesia Plan    ASA 2     MAC   total IV anesthesia  intravenous induction     Anesthetic plan, risks, benefits, and alternatives have been provided, discussed and informed consent has been obtained with: patient.  Pre-procedure education provided  Plan discussed with CRNA.    CODE STATUS:

## 2025-03-03 NOTE — OP NOTE
ESOPHAGOGASTRODUODENOSCOPY  Procedure Report    Patient Name:  Gwen Barnes  YOB: 1990    Date of Surgery:  3/3/2025     Indications: History of gastric bypass with weight regain    Pre-op Diagnosis:   History of gastric bypass with weight regain    Post-op Diagnosis:   Same    Procedure/CPT® Codes:  [place] ESOPHAGOGASTRODUODENOSCOPY TRANSORAL DIAGNOSTIC [68710]    Procedure(s):  ESOPHAGOGASTRODUODENOSCOPY WITH BIOPSY X1 AREA    Staff:  Surgeon(s):  Luna Keyes MD    Anesthesia: Monitored Anesthesia Care    Estimated Blood Loss: none    Implants:    Nothing was implanted during the procedure    Specimen:          Specimens       ID Source Type Tests Collected By Collected At Frozen?    A Gastric, Antrum Tissue TISSUE PATHOLOGY EXAM   Luna Keyes MD 3/3/25 2904     Description: R/O [email or web address]                Findings: Normal gastric bypass anatomy.  The gastrojejunostomy was not significantly dilated.    Complications: None    Description of Procedure: Sedation was administered and the endoscope was passed down the esophagus and into the gastric pouch.  There was no abnormality.  A biopsy of the gastric pouch was performed with cold biopsy forcep to investigate for Helicobacter pylori.  The gastrojejunostomy did not look significantly dilated.  There was no marginal ulcer no blind loop.  The scope was passed into the Edwar limb and passed for several centimeters and is no abnormality seen.  The scope was then removed.      Luna Keyes MD     Date: 3/3/2025  Time: 14:17 EST

## 2025-03-05 LAB
LAB AP CASE REPORT: NORMAL
PATH REPORT.FINAL DX SPEC: NORMAL
PATH REPORT.GROSS SPEC: NORMAL

## (undated) DEVICE — ST TISSOMAT SPRY

## (undated) DEVICE — PK PROC TURNOVER

## (undated) DEVICE — SUT SILK 2/0 SH 30IN K833H

## (undated) DEVICE — DEV COND GAS LAP INSUFLOW W/LUER CONN

## (undated) DEVICE — 3M™ STERI-STRIP™ REINFORCED ADHESIVE SKIN CLOSURES, R1547, 1/2 IN X 4 IN (12 MM X 100 MM), 6 STRIPS/ENVELOPE: Brand: 3M™ STERI-STRIP™

## (undated) DEVICE — SOL NACL 0.9PCT 1000ML

## (undated) DEVICE — PK BARIATRIC 50

## (undated) DEVICE — KT SURG TURNOVER 050

## (undated) DEVICE — CUFF SCD HEMOFORCE SEQ CALF STD MD

## (undated) DEVICE — PK ENDO GI 50

## (undated) DEVICE — RETRV BG SPECI GENISTRONG MD 240ML

## (undated) DEVICE — SOL IRRIG NACL 9PCT 1000ML BTL

## (undated) DEVICE — APL DUPLOSPRAYER MIS 40CM

## (undated) DEVICE — VISIGI 3D®  CALIBRATION SYSTEM  SIZE 36FR STD W/ BULB: Brand: BOEHRINGER® VISIGI 3D™ SLEEVE GASTRECTOMY CALIBRATION SYSTEM, SIZE 36FR W/BULB

## (undated) DEVICE — BITEBLOCK ENDO W/STRAP 60F A/ LF DISP

## (undated) DEVICE — MAT PREVALON MOBL TRANSFR AIR W/PAD 39X81IN

## (undated) DEVICE — SUT VIC 0/0 UR6 27IN DYED J603H

## (undated) DEVICE — MAT PREVALON MOBL TRANSFR AIR W/PAD 39X80IN

## (undated) DEVICE — PASS SUT PRO BARIATRIC XL W/TROC SWABS

## (undated) DEVICE — SUT MONOCRYL 4/0 PS2 27IN Y426H ETY426H

## (undated) DEVICE — GLV SURG TRIUMPH LT PF LTX 7.5 STRL

## (undated) DEVICE — SKIN AFFIX SURG ADHESIVE 72/CS 0.55ML: Brand: MEDLINE

## (undated) DEVICE — DRP SURG UNIV BASIC BILAMINATE 53X77IN DISP

## (undated) DEVICE — DRSNG SURESITE WNDW 4X4.5

## (undated) DEVICE — ENDOPATH XCEL WITH OPTIVIEW TECHNOLOGY UNIVERSAL TROCAR STABILITY SLEEVES: Brand: ENDOPATH XCEL OPTIVIEW

## (undated) DEVICE — LAPAROSCOPIC GAS CONDITIONING DEVICE.: Brand: INSUFLOW

## (undated) DEVICE — SEAL

## (undated) DEVICE — ENDOPATH XCEL WITH OPTIVIEW TECHNOLOGY BLADELESS TROCARS WITH STABILITY SLEEVES: Brand: ENDOPATH XCEL OPTIVIEW

## (undated) DEVICE — ENSEAL LAPAROSCOPIC TISSUE SEALER G2 ARTICULATING CURVED JAW FOR USE WITH G2 GENERATOR 5MM DIAMETER 45CM SHAFT LENGTH: Brand: ENSEAL

## (undated) DEVICE — ENDOPATH XCEL BLADELESS TROCARS WITH STABILITY SLEEVES: Brand: ENDOPATH XCEL

## (undated) DEVICE — GLV SURG TRIUMPH GREEN W/ALOE PF LTX 8 STRL

## (undated) DEVICE — 2, DISPOSABLE SUCTION/IRRIGATOR WITH DISPOSABLE TIP: Brand: STRYKEFLOW

## (undated) DEVICE — GENERAL LAPAROSCOPY CDS: Brand: MEDLINE INDUSTRIES, INC.

## (undated) DEVICE — GLV SURG SIGNATURE ESSENTIAL PF LTX SZ7.5

## (undated) DEVICE — ARM DRAPE

## (undated) DEVICE — SINGLE-USE BIOPSY FORCEPS: Brand: RADIAL JAW 4

## (undated) DEVICE — SYR LUERLOK 50ML

## (undated) DEVICE — 40580 - THE PINK PAD - ADVANCED TRENDELENBURG POSITIONING KIT: Brand: 40580 - THE PINK PAD - ADVANCED TRENDELENBURG POSITIONING KIT

## (undated) DEVICE — SLV SCD CALF HEMOFORCE DVT THERP REPROC MD

## (undated) DEVICE — ADHS LIQ MASTISOL 2/3ML

## (undated) DEVICE — LAPAROSCOPIC SMOKE FILTRATION SYSTEM: Brand: PALL LAPAROSHIELD® PLUS LAPAROSCOPIC SMOKE FILTRATION SYSTEM

## (undated) DEVICE — IRRIGATOR TOOMEY 70CC

## (undated) DEVICE — CANNULA SEAL

## (undated) DEVICE — PAPR PRNT PK SONY W RIBN UPC55

## (undated) DEVICE — COLUMN DRAPE

## (undated) DEVICE — TROCAR: Brand: KII OPTICAL ACCESS SYSTEM

## (undated) DEVICE — COVER,MAYO STAND,STERILE: Brand: MEDLINE

## (undated) DEVICE — REDUCER: Brand: ENDOWRIST

## (undated) DEVICE — VESSEL SEALER EXTEND: Brand: ENDOWRIST

## (undated) DEVICE — ERBE NESSY®PLATE 170 SPLIT; 168CM²; CABLE 3M: Brand: ERBE

## (undated) DEVICE — NEEDLE, QUINCKE, 20GX3.5": Brand: MEDLINE

## (undated) DEVICE — CFF SCD HEMFRCE SEQ CLF STD XL

## (undated) DEVICE — ENDOPATH 5MM CURVED SCISSORS WITH MONOPOLAR CAUTERY: Brand: ENDOPATH

## (undated) DEVICE — CLMP STD 25CM DISP

## (undated) DEVICE — STAPLER 60: Brand: SUREFORM

## (undated) DEVICE — UNDRGLV SURG BIOGEL PIMICROINDICATOR SYNTH SZ8 LF STRL

## (undated) DEVICE — SUT PDS 3/0 SH 27IN DYED Z316H

## (undated) DEVICE — BLADELESS OBTURATOR: Brand: WECK VISTA

## (undated) DEVICE — ADHS SKIN PREMIERPRO EXOFIN TOPICAL HI/VISC .5ML

## (undated) DEVICE — ECHELON FLEX POWERED PLUS LONG ARTICULATING ENDOSCOPIC LINEAR CUTTER, 60MM: Brand: ECHELON FLEX